# Patient Record
Sex: MALE | Race: BLACK OR AFRICAN AMERICAN | Employment: OTHER | URBAN - METROPOLITAN AREA
[De-identification: names, ages, dates, MRNs, and addresses within clinical notes are randomized per-mention and may not be internally consistent; named-entity substitution may affect disease eponyms.]

---

## 2021-06-17 ENCOUNTER — TELEPHONE (OUTPATIENT)
Dept: ADMINISTRATIVE | Age: 66
End: 2021-06-17

## 2021-06-21 NOTE — TELEPHONE ENCOUNTER
Urgent referral in[employer] message was sent on  5/28/21 to [facility] and clinical pool   asking them to schedule the patient.    This patient still needs to be scheduled, please call patient to schedule

## 2021-09-03 NOTE — TELEPHONE ENCOUNTER
Called to schedule patient. Patient had no clue why I was calling to schedule him an appointment. Patient stated he already sees other doctors and does not need to make an appointment at this time.

## 2021-09-03 NOTE — TELEPHONE ENCOUNTER
3 urgent referral inbasket messages and a telephone encounter have been sent on this pt. This patient still needs to be scheduled. Please contact pt.

## 2023-05-03 ENCOUNTER — HOSPITAL ENCOUNTER (OUTPATIENT)
Dept: ULTRASOUND IMAGING | Age: 68
Discharge: HOME | End: 2023-05-05
Payer: COMMERCIAL

## 2023-05-03 DIAGNOSIS — R79.89 ELEVATED LFTS: ICD-10-CM

## 2023-05-03 PROCEDURE — 76705 ECHO EXAM OF ABDOMEN: CPT

## 2023-10-31 ENCOUNTER — APPOINTMENT (OUTPATIENT)
Dept: CT IMAGING | Age: 68
End: 2023-10-31
Payer: MEDICARE

## 2023-10-31 ENCOUNTER — HOSPITAL ENCOUNTER (INPATIENT)
Age: 68
LOS: 3 days | Discharge: HOME | End: 2023-11-03
Attending: STUDENT IN AN ORGANIZED HEALTH CARE EDUCATION/TRAINING PROGRAM | Admitting: STUDENT IN AN ORGANIZED HEALTH CARE EDUCATION/TRAINING PROGRAM
Payer: MEDICARE

## 2023-10-31 ENCOUNTER — APPOINTMENT (OUTPATIENT)
Dept: GENERAL RADIOLOGY | Age: 68
End: 2023-10-31
Payer: MEDICARE

## 2023-10-31 DIAGNOSIS — R53.83 OTHER FATIGUE: Primary | ICD-10-CM

## 2023-10-31 DIAGNOSIS — E83.42 HYPOMAGNESEMIA: ICD-10-CM

## 2023-10-31 DIAGNOSIS — E87.6 HYPOKALEMIA: ICD-10-CM

## 2023-10-31 DIAGNOSIS — D64.9 ANEMIA, UNSPECIFIED TYPE: ICD-10-CM

## 2023-10-31 PROBLEM — K92.2 ACUTE GI BLEEDING: Status: ACTIVE | Noted: 2023-10-31

## 2023-10-31 LAB
ALBUMIN SERPL-MCNC: 3.4 G/DL (ref 3.5–5.2)
ALP SERPL-CCNC: 196 U/L (ref 40–129)
ALT SERPL-CCNC: 6 U/L (ref 0–40)
AMPHET UR QL SCN: NEGATIVE
ANION GAP SERPL CALCULATED.3IONS-SCNC: 13 MMOL/L (ref 7–16)
ANION GAP SERPL CALCULATED.3IONS-SCNC: 14 MMOL/L (ref 7–16)
APAP SERPL-MCNC: <5 UG/ML (ref 10–30)
AST SERPL-CCNC: 32 U/L (ref 0–39)
BARBITURATES UR QL SCN: NEGATIVE
BASOPHILS # BLD: 0.03 K/UL (ref 0–0.2)
BASOPHILS NFR BLD: 0 % (ref 0–2)
BENZODIAZ UR QL: NEGATIVE
BILIRUB SERPL-MCNC: 0.8 MG/DL (ref 0–1.2)
BILIRUB UR QL STRIP: ABNORMAL
BNP SERPL-MCNC: 278 PG/ML (ref 0–125)
BUN SERPL-MCNC: 5 MG/DL (ref 6–23)
BUN SERPL-MCNC: 5 MG/DL (ref 6–23)
BUPRENORPHINE UR QL: NEGATIVE
CALCIUM SERPL-MCNC: 8.4 MG/DL (ref 8.6–10.2)
CALCIUM SERPL-MCNC: 8.4 MG/DL (ref 8.6–10.2)
CANNABINOIDS UR QL SCN: NEGATIVE
CHLORIDE SERPL-SCNC: 100 MMOL/L (ref 98–107)
CHLORIDE SERPL-SCNC: 102 MMOL/L (ref 98–107)
CLARITY UR: CLEAR
CO2 SERPL-SCNC: 22 MMOL/L (ref 22–29)
CO2 SERPL-SCNC: 22 MMOL/L (ref 22–29)
COCAINE UR QL SCN: NEGATIVE
COLOR UR: YELLOW
CREAT SERPL-MCNC: 0.6 MG/DL (ref 0.7–1.2)
CREAT SERPL-MCNC: 0.6 MG/DL (ref 0.7–1.2)
EOSINOPHIL # BLD: 0.07 K/UL (ref 0.05–0.5)
EOSINOPHILS RELATIVE PERCENT: 1 % (ref 0–6)
ERYTHROCYTE [DISTWIDTH] IN BLOOD BY AUTOMATED COUNT: 18 % (ref 11.5–15)
ETHANOLAMINE SERPL-MCNC: 75 MG/DL
ETHANOLAMINE SERPL-MCNC: <10 MG/DL
FENTANYL UR QL: NEGATIVE
FERRITIN SERPL-MCNC: 539 NG/ML
FOLATE SERPL-MCNC: <2 NG/ML (ref 4.8–24.2)
GFR SERPL CREATININE-BSD FRML MDRD: >60 ML/MIN/1.73M2
GFR SERPL CREATININE-BSD FRML MDRD: >60 ML/MIN/1.73M2
GLUCOSE SERPL-MCNC: 92 MG/DL (ref 74–99)
GLUCOSE SERPL-MCNC: 95 MG/DL (ref 74–99)
GLUCOSE UR STRIP-MCNC: 100 MG/DL
HCT VFR BLD AUTO: 21.3 % (ref 37–54)
HCT VFR BLD AUTO: 22.2 % (ref 37–54)
HGB BLD-MCNC: 6.5 G/DL (ref 12.5–16.5)
HGB BLD-MCNC: 6.6 G/DL (ref 12.5–16.5)
HGB UR QL STRIP.AUTO: NEGATIVE
IMM GRANULOCYTES # BLD AUTO: 0.03 K/UL (ref 0–0.58)
IMM GRANULOCYTES NFR BLD: 0 % (ref 0–5)
INR PPP: 1.3
IRON SATN MFR SERPL: ABNORMAL % (ref 20–55)
IRON SERPL-MCNC: 160 UG/DL (ref 59–158)
KETONES UR STRIP-MCNC: ABNORMAL MG/DL
LACTATE BLDV-SCNC: 3.7 MMOL/L (ref 0.5–2.2)
LDH SERPL-CCNC: 157 U/L (ref 135–225)
LEUKOCYTE ESTERASE UR QL STRIP: ABNORMAL
LYMPHOCYTES NFR BLD: 1.38 K/UL (ref 1.5–4)
LYMPHOCYTES RELATIVE PERCENT: 16 % (ref 20–42)
MAGNESIUM SERPL-MCNC: 1.3 MG/DL (ref 1.6–2.6)
MAGNESIUM SERPL-MCNC: 1.9 MG/DL (ref 1.6–2.6)
MCH RBC QN AUTO: 27.3 PG (ref 26–35)
MCHC RBC AUTO-ENTMCNC: 30.5 G/DL (ref 32–34.5)
MCV RBC AUTO: 89.5 FL (ref 80–99.9)
METHADONE UR QL: NEGATIVE
MONOCYTES NFR BLD: 0.38 K/UL (ref 0.1–0.95)
MONOCYTES NFR BLD: 5 % (ref 2–12)
NEUTROPHILS NFR BLD: 78 % (ref 43–80)
NEUTS SEG NFR BLD: 6.61 K/UL (ref 1.8–7.3)
NITRITE UR QL STRIP: NEGATIVE
OPIATES UR QL SCN: NEGATIVE
OXYCODONE UR QL SCN: NEGATIVE
PCP UR QL SCN: NEGATIVE
PH UR STRIP: 6.5 [PH] (ref 5–9)
PLATELET # BLD AUTO: 325 K/UL (ref 130–450)
PMV BLD AUTO: 9.2 FL (ref 7–12)
POTASSIUM SERPL-SCNC: 2.5 MMOL/L (ref 3.5–5)
POTASSIUM SERPL-SCNC: 3.5 MMOL/L (ref 3.5–5)
PROT SERPL-MCNC: 7.1 G/DL (ref 6.4–8.3)
PROT UR STRIP-MCNC: 30 MG/DL
PROTHROMBIN TIME: 14.2 SEC (ref 9.3–12.4)
RBC # BLD AUTO: 2.38 M/UL (ref 3.8–5.8)
SALICYLATES SERPL-MCNC: <0.3 MG/DL (ref 0–30)
SODIUM SERPL-SCNC: 136 MMOL/L (ref 132–146)
SODIUM SERPL-SCNC: 137 MMOL/L (ref 132–146)
SP GR UR STRIP: 1.01 (ref 1–1.03)
TEST INFORMATION: NORMAL
TIBC SERPL-MCNC: ABNORMAL UG/DL (ref 250–450)
TOXIC TRICYCLIC SC,BLOOD: NEGATIVE
TRANSFERRIN SERPL-MCNC: 141 MG/DL (ref 200–360)
TROPONIN I SERPL HS-MCNC: 15 NG/L (ref 0–11)
UROBILINOGEN UR STRIP-ACNC: >8 EU/DL (ref 0–1)
VIT B12 SERPL-MCNC: 1589 PG/ML (ref 211–946)
WBC OTHER # BLD: 8.5 K/UL (ref 4.5–11.5)

## 2023-10-31 PROCEDURE — 80179 DRUG ASSAY SALICYLATE: CPT

## 2023-10-31 PROCEDURE — 74177 CT ABD & PELVIS W/CONTRAST: CPT

## 2023-10-31 PROCEDURE — 96361 HYDRATE IV INFUSION ADD-ON: CPT

## 2023-10-31 PROCEDURE — 83735 ASSAY OF MAGNESIUM: CPT

## 2023-10-31 PROCEDURE — 86850 RBC ANTIBODY SCREEN: CPT

## 2023-10-31 PROCEDURE — 80053 COMPREHEN METABOLIC PANEL: CPT

## 2023-10-31 PROCEDURE — 99285 EMERGENCY DEPT VISIT HI MDM: CPT

## 2023-10-31 PROCEDURE — 6360000004 HC RX CONTRAST MEDICATION: Performed by: RADIOLOGY

## 2023-10-31 PROCEDURE — 96374 THER/PROPH/DIAG INJ IV PUSH: CPT

## 2023-10-31 PROCEDURE — 83605 ASSAY OF LACTIC ACID: CPT

## 2023-10-31 PROCEDURE — 6360000002 HC RX W HCPCS: Performed by: STUDENT IN AN ORGANIZED HEALTH CARE EDUCATION/TRAINING PROGRAM

## 2023-10-31 PROCEDURE — 84466 ASSAY OF TRANSFERRIN: CPT

## 2023-10-31 PROCEDURE — 6370000000 HC RX 637 (ALT 250 FOR IP): Performed by: NURSE PRACTITIONER

## 2023-10-31 PROCEDURE — 86900 BLOOD TYPING SEROLOGIC ABO: CPT

## 2023-10-31 PROCEDURE — 80143 DRUG ASSAY ACETAMINOPHEN: CPT

## 2023-10-31 PROCEDURE — 2580000003 HC RX 258: Performed by: NURSE PRACTITIONER

## 2023-10-31 PROCEDURE — 85025 COMPLETE CBC W/AUTO DIFF WBC: CPT

## 2023-10-31 PROCEDURE — 71250 CT THORAX DX C-: CPT

## 2023-10-31 PROCEDURE — 93005 ELECTROCARDIOGRAM TRACING: CPT | Performed by: NURSE PRACTITIONER

## 2023-10-31 PROCEDURE — 85018 HEMOGLOBIN: CPT

## 2023-10-31 PROCEDURE — 82746 ASSAY OF FOLIC ACID SERUM: CPT

## 2023-10-31 PROCEDURE — 84484 ASSAY OF TROPONIN QUANT: CPT

## 2023-10-31 PROCEDURE — 85014 HEMATOCRIT: CPT

## 2023-10-31 PROCEDURE — 71046 X-RAY EXAM CHEST 2 VIEWS: CPT

## 2023-10-31 PROCEDURE — 80307 DRUG TEST PRSMV CHEM ANLYZR: CPT

## 2023-10-31 PROCEDURE — C9113 INJ PANTOPRAZOLE SODIUM, VIA: HCPCS | Performed by: NURSE PRACTITIONER

## 2023-10-31 PROCEDURE — 83615 LACTATE (LD) (LDH) ENZYME: CPT

## 2023-10-31 PROCEDURE — 36415 COLL VENOUS BLD VENIPUNCTURE: CPT

## 2023-10-31 PROCEDURE — 81001 URINALYSIS AUTO W/SCOPE: CPT

## 2023-10-31 PROCEDURE — 2140000000 HC CCU INTERMEDIATE R&B

## 2023-10-31 PROCEDURE — 85610 PROTHROMBIN TIME: CPT

## 2023-10-31 PROCEDURE — G0480 DRUG TEST DEF 1-7 CLASSES: HCPCS

## 2023-10-31 PROCEDURE — 86901 BLOOD TYPING SEROLOGIC RH(D): CPT

## 2023-10-31 PROCEDURE — 82607 VITAMIN B-12: CPT

## 2023-10-31 PROCEDURE — 82728 ASSAY OF FERRITIN: CPT

## 2023-10-31 PROCEDURE — 83540 ASSAY OF IRON: CPT

## 2023-10-31 PROCEDURE — 80048 BASIC METABOLIC PNL TOTAL CA: CPT

## 2023-10-31 PROCEDURE — A4216 STERILE WATER/SALINE, 10 ML: HCPCS | Performed by: STUDENT IN AN ORGANIZED HEALTH CARE EDUCATION/TRAINING PROGRAM

## 2023-10-31 PROCEDURE — 6360000002 HC RX W HCPCS: Performed by: NURSE PRACTITIONER

## 2023-10-31 PROCEDURE — 2580000003 HC RX 258: Performed by: STUDENT IN AN ORGANIZED HEALTH CARE EDUCATION/TRAINING PROGRAM

## 2023-10-31 PROCEDURE — C9113 INJ PANTOPRAZOLE SODIUM, VIA: HCPCS | Performed by: STUDENT IN AN ORGANIZED HEALTH CARE EDUCATION/TRAINING PROGRAM

## 2023-10-31 PROCEDURE — 86923 COMPATIBILITY TEST ELECTRIC: CPT

## 2023-10-31 PROCEDURE — 83880 ASSAY OF NATRIURETIC PEPTIDE: CPT

## 2023-10-31 RX ORDER — LOSARTAN POTASSIUM 100 MG/1
100 TABLET ORAL DAILY
COMMUNITY

## 2023-10-31 RX ORDER — POTASSIUM CHLORIDE 20 MEQ/1
40 TABLET, EXTENDED RELEASE ORAL ONCE
Status: COMPLETED | OUTPATIENT
Start: 2023-10-31 | End: 2023-10-31

## 2023-10-31 RX ORDER — SODIUM CHLORIDE 9 MG/ML
INJECTION, SOLUTION INTRAVENOUS PRN
Status: DISCONTINUED | OUTPATIENT
Start: 2023-10-31 | End: 2023-11-03 | Stop reason: HOSPADM

## 2023-10-31 RX ORDER — FERROUS SULFATE 325(65) MG
325 TABLET ORAL
Status: ON HOLD | COMMUNITY
End: 2023-11-02 | Stop reason: HOSPADM

## 2023-10-31 RX ORDER — POTASSIUM CHLORIDE 7.45 MG/ML
10 INJECTION INTRAVENOUS
Status: ACTIVE | OUTPATIENT
Start: 2023-10-31 | End: 2023-10-31

## 2023-10-31 RX ORDER — POTASSIUM CHLORIDE 7.45 MG/ML
10 INJECTION INTRAVENOUS PRN
Status: DISCONTINUED | OUTPATIENT
Start: 2023-10-31 | End: 2023-11-03 | Stop reason: HOSPADM

## 2023-10-31 RX ORDER — PANTOPRAZOLE SODIUM 40 MG/10ML
40 INJECTION, POWDER, LYOPHILIZED, FOR SOLUTION INTRAVENOUS 2 TIMES DAILY
Status: DISCONTINUED | OUTPATIENT
Start: 2023-10-31 | End: 2023-10-31 | Stop reason: SDUPTHER

## 2023-10-31 RX ORDER — ACETAMINOPHEN 650 MG/1
650 SUPPOSITORY RECTAL EVERY 6 HOURS PRN
Status: DISCONTINUED | OUTPATIENT
Start: 2023-10-31 | End: 2023-11-03 | Stop reason: HOSPADM

## 2023-10-31 RX ORDER — SODIUM CHLORIDE 0.9 % (FLUSH) 0.9 %
10 SYRINGE (ML) INJECTION EVERY 12 HOURS SCHEDULED
Status: DISCONTINUED | OUTPATIENT
Start: 2023-10-31 | End: 2023-11-03 | Stop reason: HOSPADM

## 2023-10-31 RX ORDER — PANTOPRAZOLE SODIUM 40 MG/10ML
40 INJECTION, POWDER, LYOPHILIZED, FOR SOLUTION INTRAVENOUS ONCE
Status: COMPLETED | OUTPATIENT
Start: 2023-10-31 | End: 2023-10-31

## 2023-10-31 RX ORDER — ACETAMINOPHEN 325 MG/1
650 TABLET ORAL EVERY 6 HOURS PRN
Status: DISCONTINUED | OUTPATIENT
Start: 2023-10-31 | End: 2023-11-03 | Stop reason: HOSPADM

## 2023-10-31 RX ORDER — SENNOSIDES A AND B 8.6 MG/1
1 TABLET, FILM COATED ORAL DAILY PRN
Status: DISCONTINUED | OUTPATIENT
Start: 2023-10-31 | End: 2023-11-03 | Stop reason: HOSPADM

## 2023-10-31 RX ORDER — GABAPENTIN 100 MG/1
100 CAPSULE ORAL EVERY 12 HOURS PRN
COMMUNITY

## 2023-10-31 RX ORDER — THIAMINE MONONITRATE (VIT B1) 100 MG
100 TABLET ORAL DAILY
COMMUNITY

## 2023-10-31 RX ORDER — TAMSULOSIN HYDROCHLORIDE 0.4 MG/1
0.4 CAPSULE ORAL DAILY
COMMUNITY

## 2023-10-31 RX ORDER — SODIUM CHLORIDE 0.9 % (FLUSH) 0.9 %
10 SYRINGE (ML) INJECTION PRN
Status: DISCONTINUED | OUTPATIENT
Start: 2023-10-31 | End: 2023-11-03 | Stop reason: HOSPADM

## 2023-10-31 RX ORDER — SODIUM CHLORIDE AND POTASSIUM CHLORIDE 300; 900 MG/100ML; MG/100ML
INJECTION, SOLUTION INTRAVENOUS CONTINUOUS
Status: DISCONTINUED | OUTPATIENT
Start: 2023-10-31 | End: 2023-11-01

## 2023-10-31 RX ORDER — FOLIC ACID 1 MG/1
1 TABLET ORAL DAILY
COMMUNITY

## 2023-10-31 RX ORDER — MAGNESIUM SULFATE IN WATER 40 MG/ML
2000 INJECTION, SOLUTION INTRAVENOUS ONCE
Status: COMPLETED | OUTPATIENT
Start: 2023-10-31 | End: 2023-10-31

## 2023-10-31 RX ORDER — ONDANSETRON 2 MG/ML
4 INJECTION INTRAMUSCULAR; INTRAVENOUS EVERY 6 HOURS PRN
Status: DISCONTINUED | OUTPATIENT
Start: 2023-10-31 | End: 2023-11-03 | Stop reason: HOSPADM

## 2023-10-31 RX ORDER — ONDANSETRON 4 MG/1
4 TABLET, ORALLY DISINTEGRATING ORAL EVERY 8 HOURS PRN
Status: DISCONTINUED | OUTPATIENT
Start: 2023-10-31 | End: 2023-11-03 | Stop reason: HOSPADM

## 2023-10-31 RX ORDER — POTASSIUM CHLORIDE 20 MEQ/1
40 TABLET, EXTENDED RELEASE ORAL PRN
Status: DISCONTINUED | OUTPATIENT
Start: 2023-10-31 | End: 2023-11-03 | Stop reason: HOSPADM

## 2023-10-31 RX ORDER — 0.9 % SODIUM CHLORIDE 0.9 %
500 INTRAVENOUS SOLUTION INTRAVENOUS ONCE
Status: COMPLETED | OUTPATIENT
Start: 2023-10-31 | End: 2023-10-31

## 2023-10-31 RX ORDER — LANOLIN ALCOHOL/MO/W.PET/CERES
3 CREAM (GRAM) TOPICAL NIGHTLY PRN
Status: DISCONTINUED | OUTPATIENT
Start: 2023-10-31 | End: 2023-11-03 | Stop reason: HOSPADM

## 2023-10-31 RX ADMIN — PANTOPRAZOLE SODIUM 40 MG: 40 INJECTION, POWDER, FOR SOLUTION INTRAVENOUS at 21:36

## 2023-10-31 RX ADMIN — IOPAMIDOL 75 ML: 755 INJECTION, SOLUTION INTRAVENOUS at 18:54

## 2023-10-31 RX ADMIN — POTASSIUM CHLORIDE 10 MEQ: 10 INJECTION, SOLUTION INTRAVENOUS at 20:17

## 2023-10-31 RX ADMIN — POTASSIUM CHLORIDE 10 MEQ: 10 INJECTION, SOLUTION INTRAVENOUS at 21:42

## 2023-10-31 RX ADMIN — POTASSIUM CHLORIDE AND SODIUM CHLORIDE: 900; 300 INJECTION, SOLUTION INTRAVENOUS at 21:39

## 2023-10-31 RX ADMIN — WATER 1000 MG: 1 INJECTION INTRAMUSCULAR; INTRAVENOUS; SUBCUTANEOUS at 20:03

## 2023-10-31 RX ADMIN — SODIUM CHLORIDE 500 ML: 9 INJECTION, SOLUTION INTRAVENOUS at 15:13

## 2023-10-31 RX ADMIN — PANTOPRAZOLE SODIUM 40 MG: 40 INJECTION, POWDER, FOR SOLUTION INTRAVENOUS at 15:13

## 2023-10-31 RX ADMIN — POTASSIUM CHLORIDE 10 MEQ: 10 INJECTION, SOLUTION INTRAVENOUS at 22:42

## 2023-10-31 RX ADMIN — MAGNESIUM SULFATE IN WATER 2000 MG: 40 INJECTION, SOLUTION INTRAVENOUS at 20:15

## 2023-10-31 RX ADMIN — POTASSIUM CHLORIDE 40 MEQ: 1500 TABLET, EXTENDED RELEASE ORAL at 20:17

## 2023-10-31 ASSESSMENT — PAIN - FUNCTIONAL ASSESSMENT: PAIN_FUNCTIONAL_ASSESSMENT: NONE - DENIES PAIN

## 2023-10-31 NOTE — ED NOTES
Department of Emergency Medicine  FIRST PROVIDER TRIAGE NOTE             Independent MLP           10/31/23  2:41 PM EDT    Date of Encounter: 10/31/23   MRN: [id number]      HPI: Munira Albarran is a 80 y.o. male who presents to the ED for No chief complaint on file. Patient is 52-year-old who states that he has been very fatigued and tired the last few weeks. Patient has dark-colored urine. Patient had blood work done and his hemoglobin was found to be 6.5 done today. Patient also has a low potassium of 2.9. I did review the paperwork. ROS: Negative for cp, sob, abd pain, or back pain. PE: Gen Appearance/Constitutional: alert  HEENT: NC/NT. PERRLA,  Airway patent. Neck: supple     Initial Plan of Care: All treatment areas with department are currently occupied. Plan to order/Initiate the following while awaiting opening in ED: labs and EKG.   Initiate Treatment-Testing, Proceed toTreatment Area When Bed Available for ED Attending/MLP to Continue Care    Electronically signed by Higinio Bran PA-C   DD: 10/31/23       Higinio Bran PA-C  10/31/23 5648

## 2023-10-31 NOTE — CARE COORDINATION
History of alcohol abuse   Reportedly sober 28 days   Presented for abnormal op labs and reports of dark tarry stools   Reportedly occult blood stool negative at PCP office   History concerning for upper GI bleeding however given hx of etoh abuse and now with acute anemia requiring blood transfusion     CT a/p   PPI IV BID   HH Q6H   Transfuse <7 Gi consultation within normal limits

## 2023-11-01 LAB
ALBUMIN SERPL-MCNC: 3.1 G/DL (ref 3.5–5.2)
ALP SERPL-CCNC: 165 U/L (ref 40–129)
ALT SERPL-CCNC: <5 U/L (ref 0–40)
ANION GAP SERPL CALCULATED.3IONS-SCNC: 14 MMOL/L (ref 7–16)
AST SERPL-CCNC: 28 U/L (ref 0–39)
BACTERIA URNS QL MICRO: ABNORMAL
BASOPHILS # BLD: 0.03 K/UL (ref 0–0.2)
BASOPHILS NFR BLD: 0 % (ref 0–2)
BILIRUB SERPL-MCNC: 1.4 MG/DL (ref 0–1.2)
BUN SERPL-MCNC: 5 MG/DL (ref 6–23)
CALCIUM SERPL-MCNC: 7.9 MG/DL (ref 8.6–10.2)
CASTS #/AREA URNS LPF: ABNORMAL /LPF
CHLORIDE SERPL-SCNC: 106 MMOL/L (ref 98–107)
CO2 SERPL-SCNC: 19 MMOL/L (ref 22–29)
CREAT SERPL-MCNC: 0.5 MG/DL (ref 0.7–1.2)
EKG ATRIAL RATE: 69 BPM
EKG P AXIS: 41 DEGREES
EKG P-R INTERVAL: 176 MS
EKG Q-T INTERVAL: 418 MS
EKG QRS DURATION: 84 MS
EKG QTC CALCULATION (BAZETT): 447 MS
EKG R AXIS: -31 DEGREES
EKG T AXIS: -23 DEGREES
EKG VENTRICULAR RATE: 69 BPM
EOSINOPHIL # BLD: 0.11 K/UL (ref 0.05–0.5)
EOSINOPHILS RELATIVE PERCENT: 1 % (ref 0–6)
EPI CELLS #/AREA URNS HPF: ABNORMAL /HPF
ERYTHROCYTE [DISTWIDTH] IN BLOOD BY AUTOMATED COUNT: 16.6 % (ref 11.5–15)
GFR SERPL CREATININE-BSD FRML MDRD: >60 ML/MIN/1.73M2
GLUCOSE SERPL-MCNC: 87 MG/DL (ref 74–99)
HCT VFR BLD AUTO: 21.5 % (ref 37–54)
HCT VFR BLD AUTO: 22 % (ref 37–54)
HCT VFR BLD AUTO: 22.9 % (ref 37–54)
HCT VFR BLD AUTO: 23.6 % (ref 37–54)
HCT VFR BLD AUTO: 26 % (ref 37–54)
HEMOCCULT STL QL: ABNORMAL
HGB BLD-MCNC: 6.6 G/DL (ref 12.5–16.5)
HGB BLD-MCNC: 6.7 G/DL (ref 12.5–16.5)
HGB BLD-MCNC: 7.2 G/DL (ref 12.5–16.5)
HGB BLD-MCNC: 7.4 G/DL (ref 12.5–16.5)
HGB BLD-MCNC: 7.8 G/DL (ref 12.5–16.5)
IMM GRANULOCYTES # BLD AUTO: 0.04 K/UL (ref 0–0.58)
IMM GRANULOCYTES NFR BLD: 1 % (ref 0–5)
LYMPHOCYTES NFR BLD: 1.47 K/UL (ref 1.5–4)
LYMPHOCYTES RELATIVE PERCENT: 17 % (ref 20–42)
MCH RBC QN AUTO: 27.5 PG (ref 26–35)
MCHC RBC AUTO-ENTMCNC: 30.5 G/DL (ref 32–34.5)
MCV RBC AUTO: 90.2 FL (ref 80–99.9)
MONOCYTES NFR BLD: 0.46 K/UL (ref 0.1–0.95)
MONOCYTES NFR BLD: 5 % (ref 2–12)
NEUTROPHILS NFR BLD: 76 % (ref 43–80)
NEUTS SEG NFR BLD: 6.73 K/UL (ref 1.8–7.3)
PLATELET # BLD AUTO: 253 K/UL (ref 130–450)
PMV BLD AUTO: 9.7 FL (ref 7–12)
POTASSIUM SERPL-SCNC: 3.8 MMOL/L (ref 3.5–5)
PROT SERPL-MCNC: 6.3 G/DL (ref 6.4–8.3)
RBC # BLD AUTO: 2.44 M/UL (ref 3.8–5.8)
RBC #/AREA URNS HPF: ABNORMAL /HPF
SODIUM SERPL-SCNC: 139 MMOL/L (ref 132–146)
WBC #/AREA URNS HPF: ABNORMAL /HPF
WBC OTHER # BLD: 8.8 K/UL (ref 4.5–11.5)

## 2023-11-01 PROCEDURE — 6370000000 HC RX 637 (ALT 250 FOR IP): Performed by: STUDENT IN AN ORGANIZED HEALTH CARE EDUCATION/TRAINING PROGRAM

## 2023-11-01 PROCEDURE — C9113 INJ PANTOPRAZOLE SODIUM, VIA: HCPCS | Performed by: STUDENT IN AN ORGANIZED HEALTH CARE EDUCATION/TRAINING PROGRAM

## 2023-11-01 PROCEDURE — 85018 HEMOGLOBIN: CPT

## 2023-11-01 PROCEDURE — 2500000003 HC RX 250 WO HCPCS: Performed by: STUDENT IN AN ORGANIZED HEALTH CARE EDUCATION/TRAINING PROGRAM

## 2023-11-01 PROCEDURE — P9016 RBC LEUKOCYTES REDUCED: HCPCS

## 2023-11-01 PROCEDURE — 6360000002 HC RX W HCPCS: Performed by: STUDENT IN AN ORGANIZED HEALTH CARE EDUCATION/TRAINING PROGRAM

## 2023-11-01 PROCEDURE — 36430 TRANSFUSION BLD/BLD COMPNT: CPT

## 2023-11-01 PROCEDURE — 97165 OT EVAL LOW COMPLEX 30 MIN: CPT

## 2023-11-01 PROCEDURE — 97161 PT EVAL LOW COMPLEX 20 MIN: CPT

## 2023-11-01 PROCEDURE — 2140000000 HC CCU INTERMEDIATE R&B

## 2023-11-01 PROCEDURE — 80053 COMPREHEN METABOLIC PANEL: CPT

## 2023-11-01 PROCEDURE — A4216 STERILE WATER/SALINE, 10 ML: HCPCS | Performed by: STUDENT IN AN ORGANIZED HEALTH CARE EDUCATION/TRAINING PROGRAM

## 2023-11-01 PROCEDURE — 85025 COMPLETE CBC W/AUTO DIFF WBC: CPT

## 2023-11-01 PROCEDURE — 85014 HEMATOCRIT: CPT

## 2023-11-01 PROCEDURE — 99221 1ST HOSP IP/OBS SF/LOW 40: CPT | Performed by: NURSE PRACTITIONER

## 2023-11-01 PROCEDURE — 30233N1 TRANSFUSION OF NONAUTOLOGOUS RED BLOOD CELLS INTO PERIPHERAL VEIN, PERCUTANEOUS APPROACH: ICD-10-PCS | Performed by: STUDENT IN AN ORGANIZED HEALTH CARE EDUCATION/TRAINING PROGRAM

## 2023-11-01 PROCEDURE — 97535 SELF CARE MNGMENT TRAINING: CPT

## 2023-11-01 PROCEDURE — 36415 COLL VENOUS BLD VENIPUNCTURE: CPT

## 2023-11-01 PROCEDURE — 97530 THERAPEUTIC ACTIVITIES: CPT

## 2023-11-01 PROCEDURE — 2580000003 HC RX 258: Performed by: STUDENT IN AN ORGANIZED HEALTH CARE EDUCATION/TRAINING PROGRAM

## 2023-11-01 PROCEDURE — 93010 ELECTROCARDIOGRAM REPORT: CPT | Performed by: INTERNAL MEDICINE

## 2023-11-01 RX ORDER — SODIUM CHLORIDE 9 MG/ML
INJECTION, SOLUTION INTRAVENOUS PRN
Status: DISCONTINUED | OUTPATIENT
Start: 2023-11-01 | End: 2023-11-03 | Stop reason: HOSPADM

## 2023-11-01 RX ORDER — SODIUM CHLORIDE AND POTASSIUM CHLORIDE 300; 900 MG/100ML; MG/100ML
INJECTION, SOLUTION INTRAVENOUS CONTINUOUS
Status: DISCONTINUED | OUTPATIENT
Start: 2023-11-01 | End: 2023-11-01

## 2023-11-01 RX ORDER — FOLIC ACID 5 MG/ML
1 INJECTION, SOLUTION INTRAMUSCULAR; INTRAVENOUS; SUBCUTANEOUS DAILY
Status: DISCONTINUED | OUTPATIENT
Start: 2023-11-01 | End: 2023-11-03 | Stop reason: HOSPADM

## 2023-11-01 RX ORDER — THIAMINE HYDROCHLORIDE 100 MG/ML
100 INJECTION, SOLUTION INTRAMUSCULAR; INTRAVENOUS DAILY
Status: DISCONTINUED | OUTPATIENT
Start: 2023-11-01 | End: 2023-11-03 | Stop reason: HOSPADM

## 2023-11-01 RX ADMIN — POTASSIUM CHLORIDE 10 MEQ: 10 INJECTION, SOLUTION INTRAVENOUS at 04:13

## 2023-11-01 RX ADMIN — POTASSIUM CHLORIDE AND SODIUM CHLORIDE: 900; 300 INJECTION, SOLUTION INTRAVENOUS at 08:04

## 2023-11-01 RX ADMIN — PANTOPRAZOLE SODIUM 40 MG: 40 INJECTION, POWDER, FOR SOLUTION INTRAVENOUS at 11:40

## 2023-11-01 RX ADMIN — FOLIC ACID 1 MG: 5 INJECTION, SOLUTION INTRAMUSCULAR; INTRAVENOUS; SUBCUTANEOUS at 11:41

## 2023-11-01 RX ADMIN — SODIUM CHLORIDE, PRESERVATIVE FREE 10 ML: 5 INJECTION INTRAVENOUS at 11:47

## 2023-11-01 RX ADMIN — PANTOPRAZOLE SODIUM 40 MG: 40 INJECTION, POWDER, FOR SOLUTION INTRAVENOUS at 19:48

## 2023-11-01 RX ADMIN — POTASSIUM CHLORIDE 10 MEQ: 10 INJECTION, SOLUTION INTRAVENOUS at 00:14

## 2023-11-01 RX ADMIN — ACETAMINOPHEN 650 MG: 325 TABLET ORAL at 01:24

## 2023-11-01 RX ADMIN — THIAMINE HYDROCHLORIDE 100 MG: 100 INJECTION, SOLUTION INTRAMUSCULAR; INTRAVENOUS at 11:41

## 2023-11-01 RX ADMIN — SODIUM CHLORIDE, PRESERVATIVE FREE 10 ML: 5 INJECTION INTRAVENOUS at 19:49

## 2023-11-01 RX ADMIN — POTASSIUM CHLORIDE 10 MEQ: 10 INJECTION, SOLUTION INTRAVENOUS at 02:10

## 2023-11-01 ASSESSMENT — PAIN SCALES - GENERAL
PAINLEVEL_OUTOF10: 3
PAINLEVEL_OUTOF10: 0

## 2023-11-01 ASSESSMENT — PAIN DESCRIPTION - LOCATION: LOCATION: HAND

## 2023-11-01 ASSESSMENT — PAIN DESCRIPTION - DESCRIPTORS: DESCRIPTORS: BURNING

## 2023-11-01 ASSESSMENT — PAIN DESCRIPTION - ORIENTATION: ORIENTATION: RIGHT

## 2023-11-01 NOTE — CONSULTS
[facility], [place] ([facility])   Gastroenterology, Hepatology, &  Advanced Endoscopy    Consult       ASSESSMENT AND PLAN:    Pt with recent history of melena (currently denies), and anemia. He has had a C-Scope demonstrating 2 small polyps and a EGD which was unremarkable. He has been followed by Dr. Osmel León for the previous procedures within the last 2 month's. He has been scheduled for a capsule endoscopy twice so far. The initial attempt he was unable to swallow the capsule due to size and the second attempt the equipment was not functioning. PLAN:  -monitor CBC, INR, LFT's  -Hgb to maintain >7  - Protonix 40 mg  - CL diet  - stabilize Hgb, then OK for discharge  -Follow with Dr. Osmel León for the capsule endoscopy. Patient is 52-year-old who states that he has been very fatigued and tired the last few weeks. Patient has dark-colored urine. hemoglobin was found to be 6.5 done on admission. EtOH history but states is 29 days sober currently. Spouse states he has been consuming EtOH since he was a teenager. She believes he continues to drink as he drinks outside of the house. Past Medical History:    History reviewed. No pertinent past medical history. Past Surgical History:    No past surgical history on file. Social History:    TOBACCO:   reports that he has been smoking cigarettes. He started smoking about 53 years ago. He has been smoking an average of .5 packs per day. He has never used smokeless tobacco.  ETOH:   has no history on file for alcohol use. DRUGS:   has no history on file for drug use. Family History:   No family history on file.       Current Facility-Administered Medications:     0.9 % sodium chloride infusion, , IntraVENous, PRN, Lacivita, Mary, APRN - CNP    thiamine (B-1) injection 100 mg, 100 mg, IntraVENous, Daily, Trever Anderson MD    folic acid injection 1 mg, 1 mg, IntraVENous, Daily, Anand Lin MD    0.9% NaCl with KCl 40 mEq infusion, , IntraVENous, Continuous, Anand Lin MD, Last Rate: 75 mL/hr at 11/01/23 0804, New Bag at 11/01/23 0804    0.9 % sodium chloride infusion, , IntraVENous, PRN, Evelyn Parks, APRN - CNP    sodium chloride flush 0.9 % injection 10 mL, 10 mL, IntraVENous, 2 times per day, Ellis Bajwa MD    sodium chloride flush 0.9 % injection 10 mL, 10 mL, IntraVENous, PRN, Ellis Bajwa MD    0.9 % sodium chloride infusion, , IntraVENous, PRN, Ellis Bajwa MD    potassium chloride (KLOR-CON M) extended release tablet 40 mEq, 40 mEq, Oral, PRN **OR** potassium bicarb-citric acid (EFFER-K) effervescent tablet 40 mEq, 40 mEq, Oral, PRN **OR** potassium chloride 10 mEq/100 mL IVPB (Peripheral Line), 10 mEq, IntraVENous, PRN, Ellis Bajwa MD, Last Rate: 100 mL/hr at 11/01/23 0413, 10 mEq at 11/01/23 0413    ondansetron (ZOFRAN-ODT) disintegrating tablet 4 mg, 4 mg, Oral, Q8H PRN **OR** ondansetron (ZOFRAN) injection 4 mg, 4 mg, IntraVENous, Q6H PRN, Ellis Bajwa MD    senna (SENOKOT) tablet 8.6 mg, 1 tablet, Oral, Daily PRN, Ellis Bajwa MD    acetaminophen (TYLENOL) tablet 650 mg, 650 mg, Oral, Q6H PRN, 650 mg at 11/01/23 0124 **OR** acetaminophen (TYLENOL) suppository 650 mg, 650 mg, Rectal, Q6H PRN, Ellis Bajwa MD    melatonin tablet 3 mg, 3 mg, Oral, Nightly PRN, Ellis Bajwa MD    pantoprazole (PROTONIX) 40 mg in sodium chloride (PF) 0.9 % 10 mL injection, 40 mg, IntraVENous, BID, Ellis Bajwa MD, 40 mg at 10/31/23 9338     Allergies:  Patient has no known allergies. ROS:  Aside from what was mentioned in the past medical history and history of present illness, essentially unremarkable, all others are negative.       Recent Labs     10/31/23  1451 11/01/23  0426   INR 1.3  --    ALT 6 <5   AST 32 28   ALKPHOS 196* 165*   BILITOT 0.8 1.4*   LABALBU 3.4* 3.1*       Lab Results   Component Value Date    WBC 8.8 11/01/2023    HGB 7.4 (L) 11/01/2023    HCT 23.6 (L) 11/01/2023     11/01/2023     11/01/2023    K 3.8 11/01/2023

## 2023-11-01 NOTE — H&P
Internal Medicine History & Physical     Name: Brianna Cline  : 1955  Chief Complaint: Abnormal Lab and Fatigue (Patient sent from PCP for abnormal labs K 2.9 and HGB 6.5 )  Primary Care Physician: Huma Tapia MD  Admission date: 10/31/2023  Date of service: 2023     History of Present Illness  Lc Duran is a 80y.o. year old male with a PMH of recurrent GI bleeding who presented with a chief complaint of anemia     Patient recently had c scope and EGD which were negative for acute causes of bleeding suspected to be having bleeding from small bowel has tried to have a capsule endoscopy twice but unable due to unforseen circumstances. The patient has a history of etoh abuse and he tells me he is 29 days sober is wife is at bedside and agrees with this. Denies ever having etoh wd or seizures. Otherwise K now better and ct a/p in ED showing ascites. History reviewed. No pertinent past medical history. No past surgical history on file. No family history on file. Social History  Patient lives at home . At baseline patient ambulates with out assistance   Illicit drugs: Denies   TOBACCO:   reports that he has been smoking cigarettes. He started smoking about 53 years ago. He has been smoking an average of .5 packs per day. He has never used smokeless tobacco.  ETOH:   has no history on file for alcohol use. Home Medications  Prior to Admission medications    Medication Sig Start Date End Date Taking? Authorizing Provider   folic acid (FOLVITE) 1 MG tablet Take 1 tablet by mouth daily   Yes Funmi Issa MD   vitamin B-1 (THIAMINE) 100 MG tablet Take 1 tablet by mouth daily   Yes Funmi Issa MD   losartan (COZAAR) 100 MG tablet Take 1 tablet by mouth daily   Yes Funmi Issa MD   gabapentin (NEURONTIN) 100 MG capsule Take 1 capsule by mouth every 12 hours as needed (for nerve pain).    Yes Funmi Issa MD   ferrous sulfate (IRON 325) 325 (65 Fe) MG palpation  Lungs: clear to auscultation bilaterally, without rhonchi, crackle, wheezing, or rale, no retractions or use of accessory muscles  Heart: regular rate and regular rhythm, no murmur, normal S1, S2  Abdomen: soft, non-tender; bowel sounds normal; no masses, no organomegaly  : Deferred   Extremities: no lower extremity edema, extremities atraumatic, no cyanosis, no clubbing, 2+ pedal pulses palpated  Skin: normal color, normal texture, normal turgor, no rashes, no lesions  Neurologic:5/5 muscle strength throughout, normal muscle tone throughout, face symmetric, hearing intact, tongue midline, speech appropriate without slurring, sensation to fine touch intact in upper and lower extremities    Labs-   Lab Results   Component Value Date    WBC 8.8 11/01/2023    HGB 6.6 (LL) 11/01/2023    HCT 21.5 (L) 11/01/2023     11/01/2023     11/01/2023    K 3.8 11/01/2023     11/01/2023    CREATININE 0.5 (L) 11/01/2023    BUN 5 (L) 11/01/2023    CO2 19 (L) 11/01/2023    GLUCOSE 87 11/01/2023    ALT <5 11/01/2023    AST 28 11/01/2023    INR 1.3 10/31/2023     No results found for: \"CKTOTAL\", \"CKMB\", \"CKMBINDEX\", \"TROPONINI\"    Last echocardiogram:      Recent Radiological Studies:  CT ABDOMEN PELVIS W IV CONTRAST Additional Contrast? None   Final Result   1. A suggestion of rectal wall thickening. May represent proctitis such as   due to radiation or infection, or neoplasm. 2. Also bladder appears thick-walled that may relate to prostate disease but   nonspecific and may be correlated with urinalysis. 3. Moderate ascites of uncertain etiology, possibly due to hepatic disease. RECOMMENDATIONS:   Clinical correlation. CT CHEST WO CONTRAST   Final Result   1. Mild bibasilar atelectasis. 2. Atherosclerotic changes described above. XR CHEST (2 VW)   Final Result   No acute process.              Assessment  Active Hospital Problems    Diagnosis     Acute GI bleeding [K92.2]

## 2023-11-01 NOTE — ACP (ADVANCE CARE PLANNING)
Advance Care Planning   Healthcare Decision Maker:    Primary Decision Maker: Maliha Victorbrendan - [phone]    Today we documented Decision Maker(s) consistent with Legal Next of Kin hierarchy.     Electronically signed by PARISH Cunningham on 11/1/2023 at 3:11 PM

## 2023-11-01 NOTE — CARE COORDINATION
Patient presented to the ED due to fatigue and abnormal labs, hgb 6.5 and K 2.9; admitted for acute GI bleed. Met with patient at bedside for transition of care planning. Patient reports he lives in a 3rd floor apartment with his wife, has elevator access, he is currently independent without a device, drives; reports no home DME. Uses [facility]) and PCP is Dr. Antonio Thurman. Plan is home, patient reports no home going needs. Home care services offered, patient declined. Patient's wife will transport home when discharged. Hgb 6.6 today; repeat was 7.4; GI consult pending. Case Management Assessment  Initial Evaluation    Date/Time of Evaluation: 11/1/2023 3:09 PM  Assessment Completed by: Josiane Wen    If patient is discharged prior to next notation, then this note serves as note for discharge by case management. Patient Name: Lety Sharma                   YOB: 1955  Diagnosis: Hypokalemia [E87.6]  Hypomagnesemia [E83.42]  Acute GI bleeding [K92.2]  Other fatigue [R53.83]  Anemia, unspecified type [D64.9]                   Date / Time: 10/31/2023  5:13 PM    Patient Admission Status: Inpatient   Readmission Risk (Low < 19, Mod (19-27), High > 27): Readmission Risk Score: 11.5    Current PCP: Ambrocio Bee MD  PCP verified by ? Yes    Chart Reviewed: Yes      History Provided by: Patient  Patient Orientation: Alert and Oriented    Patient Cognition: Alert    Hospitalization in the last 30 days (Readmission):  No    If yes, Readmission Assessment in  Navigator will be completed.     Advance Directives:      Code Status: Full Code   Patient's Primary Decision Maker is: Named in Scanned ACP Document      Discharge Planning:    Patient lives with: Spouse/Significant Other Type of Home: House  Primary Care Giver: Self  Patient Support Systems include: Spouse/Significant Other   Current Financial resources:    Current community resources:    Current services prior to admission: None            Current DME:              Type of Home Care services:  None    ADLS  Prior functional level: Independent in ADLs/IADLs  Current functional level: Independent in ADLs/IADLs    PT AM-PAC:   /24  OT AM-PAC: 20 /24    Family can provide assistance at DC: Yes  Would you like Case Management to discuss the discharge plan with any other family members/significant others, and if so, who? No  Plans to Return to Present Housing: Yes  Other Identified Issues/Barriers to RETURNING to current housing: n/a  Potential Assistance needed at discharge: N/A            Potential DME:    Patient expects to discharge to: [address] [place] [address] for transportation at discharge:      Financial    Payor: Mustapha Covert / Plan: Kenyetta Maier / Product Type: *No Product type* /     Does insurance require precert for SNF: Yes    Potential assistance Purchasing Medications:    Meds-to-Beds request: Yes      [address] [place] [address],[address] [place], [address] [address]  Phone: [phone] Fax: [phone]      Notes:    Factors facilitating achievement of predicted outcomes: Cooperative    Barriers to discharge: pending GI consult, low hgb    Additional Case Management Notes: n/a    The Plan for Transition of Care is related to the following treatment goals of Hypokalemia [E87.6]  Hypomagnesemia [E83.42]  Acute GI bleeding [K92.2]  Other fatigue [R53.83]  Anemia, unspecified type [E27.3]    IF APPLICABLE: The Patient and/or patient representative Lima Hancock and his family were provided with a choice of provider and agrees with the discharge plan. Freedom of choice list with basic dialogue that supports the patient's individualized plan of care/goals and shares the quality data associated with the providers was provided to: Patient   Patient Representative Name:       The Patient and/or Patient Representative Agree with the Discharge Plan?

## 2023-11-01 NOTE — PROGRESS NOTES
Occupational Therapy  OCCUPATIONAL THERAPY INITIAL EVALUATION    JAHAIRA Cantu [facility]   [address] [place] [address]      AXOH:                                                Patient Name: Chelsey Klein  MRN: [id number]  : 1955  Room: [address] [place] [address]    Evaluating OT: Edison Monsalve OTR/L #[id number]     Referring Provider: Chacha Hartley MD  Specific Provider Orders/Date: OT eval and treat 10/31/23    Diagnosis: Hypokalemia [E87.6]  Hypomagnesemia [E83.42]  Acute GI bleeding [K92.2]  Other fatigue [R53.83]  Anemia, unspecified type [D64.9]   Pt admitted to hospital with abnormal labs, tarry stool and fatigue; low hgb     Pertinent Medical History:  has no past medical history on file.        Precautions:  Fall Risk    Assessment of current deficits    [x] Functional mobility  [x]ADLs  [x] Strength               []Cognition    [x] Functional transfers   [x] IADLs         [x] Safety Awareness   [x]Endurance    [] Fine Coordination              [x] Balance      [] Vision/perception   []Sensation     []Gross Motor Coordination  [] ROM  [] Delirium                   [] Motor Control     OT PLAN OF CARE   OT POC based on physician orders, patient diagnosis and results of clinical assessment    Frequency/Duration 1-3 days/wk for 2 weeks PRN   Specific OT Treatment Interventions to include:   * Instruction/training on adapted ADL techniques and AE recommendations to increase functional independence within precautions       * Training on energy conservation strategies, correct breathing pattern and techniques to improve independence/tolerance for self-care routine  * Functional transfer/mobility training/DME recommendations for increased independence, safety, and fall prevention  * Patient/Family education to increase follow through with safety techniques and functional independence  * Recommendation of environmental modifications for increased safety with functional testing/informal observation of tasks, assessment of data and development of plan of care and goals.           Rena Sanford OTR/L #[id number]

## 2023-11-01 NOTE — PROGRESS NOTES
Physical Therapy  Physical Therapy Initial Assessment     Name: Kevin Chacko  : 1955  MRN: [id number]      Date of Service: 2023    Evaluating PT:  Ana Taylor, PT, DPT    Room #:  4359/0320-B  Diagnosis:  Hypokalemia [E87.6]  Hypomagnesemia [E83.42]  Acute GI bleeding [K92.2]  Other fatigue [R53.83]  Anemia, unspecified type [D64.9]  PMHx/PSHx:  EtOH abuse, prostate CA  Procedure/Surgery:  N/A  Precautions:  fall risk  Equipment Needs:  TBD    SUBJECTIVE:    Pt lives with spouse in a 3rd floor apt with level entry to building, elevator access to room. Pt ambulated with no AD PTA. OBJECTIVE:   Initial Evaluation  Date: 23 Treatment Short Term/ Long Term   Goals   AM-PAC 6 Clicks 87/54     Was pt agreeable to Eval/treatment? yes     Does pt have pain?  No pain     Bed Mobility  Rolling: IND  Supine to sit: IND  Sit to supine: NT  Scooting: IND     Transfers Sit to stand: Supervision  Stand to sit: Supervision  Stand pivot: Supervision with no AD  Sit to stand: IND  Stand to sit: IND  Stand pivot: IND with no AD   Ambulation    75'x2 with no AD Supervision  300'+ with no AD IND   Stair negotiation: ascended and descended  NT       Strength/ROM:   BLE grossly 5/5  BLE AROM WFL    Balance:   Static Sitting: IND  Dynamic Sitting: IND  Static Standing: IND with no AD  Dynamic Standing: Supervision with no AD    Pt is A & O x 3  Sensation:  Pt denies numbness and tingling to extremities  Edema:  unremarkable    Vitals:  SpO2 and HR were stable during session    Therapeutic Exercises:    Bed mobility: supine>sit, cued for EOB positioning  Transfers: STS x3, cued for hand placement and postural correction  Ambulation: 75'x2 with no AD  BLE AROM    Patient education  Pt educated on role of PT, importance of functional mobility during hospital stay, safety with functional mobility    Patient response to education:   Pt verbalized understanding Pt demonstrated skill Pt requires further education in this area

## 2023-11-01 NOTE — PROGRESS NOTES
FOBT (+)  Dr. Carlos Alberto Walter via [employer]    Dr. Juliane Dolan [facility]

## 2023-11-01 NOTE — PROGRESS NOTES
Comprehensive Nutrition Assessment    Type and Reason for Visit:  Initial, Positive Nutrition Screen    Nutrition Recommendations/Plan:   Continue current diet as tolerated. Recommend and start ONS: ensure + once daily to optimize PO intake. Will monitor. Malnutrition Assessment:  Malnutrition Status: At risk for malnutrition (Comment) (11/01/23 0488)    Context:  Acute Illness     Findings of the 6 clinical characteristics of malnutrition:  Energy Intake:  Mild decrease in energy intake (Comment)  Weight Loss:  Unable to assess (2/2 lack of wt hx on file to assess)     Body Fat Loss:  Unable to assess     Muscle Mass Loss:  Unable to assess    Fluid Accumulation:  Unable to assess     Strength:  Not Performed    Nutrition Assessment:    Pt. admitted for fatigue, abnormal op labs and reports of dark tarry stools w/ concern for acute GIB. GI consulted. Hx of etoh abuse. Pt. now on regular diet. Will continue to monitor. Nutrition Related Findings:    A&OX4, +I/O (1.2L), elevated bili (1.4), GI WDL, +1 edema, Wound Type: None       Current Nutrition Intake & Therapies:    Average Meal Intake: Unable to assess  Average Supplements Intake: None Ordered  ADULT DIET; Regular    Anthropometric Measures:  Height: 180.3 cm (5' 10.98\")  Ideal Body Weight (IBW): 172 lbs (78 kg)    Admission Body Weight: 77.5 kg (170 lb 13.7 oz) (10/31 no method first measured)  Current Body Weight: 77.5 kg (170 lb 13.7 oz) (10/31 no method), 99.3 % IBW.  Weight Source: Not Specified  Current BMI (kg/m2): 23.8  Usual Body Weight:  (UTO d/t lack of wt hx on file to assess)     Weight Adjustment For: No Adjustment                 BMI Categories: Normal Weight (BMI 22.0 to 24.9) age over 72    Estimated Daily Nutrient Needs:  Energy Requirements Based On: Formula  Weight Used for Energy Requirements: Current  Energy (kcal/day): 1700-1900kcal (MSJ x1.1-1.2SF)  Weight Used for Protein Requirements: Current  Protein (g/day):

## 2023-11-02 PROBLEM — I48.0 PAF (PAROXYSMAL ATRIAL FIBRILLATION) (HCC): Status: ACTIVE | Noted: 2023-11-02

## 2023-11-02 LAB
ABO/RH: NORMAL
ALBUMIN SERPL-MCNC: 2.8 G/DL (ref 3.5–5.2)
ALP SERPL-CCNC: 159 U/L (ref 40–129)
ALT SERPL-CCNC: <5 U/L (ref 0–40)
ANION GAP SERPL CALCULATED.3IONS-SCNC: 14 MMOL/L (ref 7–16)
ANTIBODY SCREEN: NEGATIVE
ARM BAND NUMBER: NORMAL
AST SERPL-CCNC: 25 U/L (ref 0–39)
BASOPHILS # BLD: 0.03 K/UL (ref 0–0.2)
BASOPHILS NFR BLD: 0 % (ref 0–2)
BILIRUB SERPL-MCNC: 1.2 MG/DL (ref 0–1.2)
BLOOD BANK BLOOD PRODUCT EXPIRATION DATE: NORMAL
BLOOD BANK BLOOD PRODUCT EXPIRATION DATE: NORMAL
BLOOD BANK DISPENSE STATUS: NORMAL
BLOOD BANK DISPENSE STATUS: NORMAL
BLOOD BANK ISBT PRODUCT BLOOD TYPE: 5100
BLOOD BANK ISBT PRODUCT BLOOD TYPE: 9500
BLOOD BANK PRODUCT CODE: NORMAL
BLOOD BANK PRODUCT CODE: NORMAL
BLOOD BANK SAMPLE EXPIRATION: NORMAL
BLOOD BANK UNIT TYPE AND RH: NORMAL
BLOOD BANK UNIT TYPE AND RH: NORMAL
BPU ID: NORMAL
BPU ID: NORMAL
BUN SERPL-MCNC: 4 MG/DL (ref 6–23)
CALCIUM SERPL-MCNC: 8.2 MG/DL (ref 8.6–10.2)
CHLORIDE SERPL-SCNC: 105 MMOL/L (ref 98–107)
CO2 SERPL-SCNC: 18 MMOL/L (ref 22–29)
COMPONENT: NORMAL
COMPONENT: NORMAL
CREAT SERPL-MCNC: 0.5 MG/DL (ref 0.7–1.2)
CROSSMATCH RESULT: NORMAL
CROSSMATCH RESULT: NORMAL
EOSINOPHIL # BLD: 0.16 K/UL (ref 0.05–0.5)
EOSINOPHILS RELATIVE PERCENT: 2 % (ref 0–6)
ERYTHROCYTE [DISTWIDTH] IN BLOOD BY AUTOMATED COUNT: 16.6 % (ref 11.5–15)
GFR SERPL CREATININE-BSD FRML MDRD: >60 ML/MIN/1.73M2
GLUCOSE SERPL-MCNC: 99 MG/DL (ref 74–99)
HBA1C MFR BLD: 4.9 % (ref 4–5.6)
HCT VFR BLD AUTO: 24 % (ref 37–54)
HGB BLD-MCNC: 7.5 G/DL (ref 12.5–16.5)
IMM GRANULOCYTES # BLD AUTO: 0.04 K/UL (ref 0–0.58)
IMM GRANULOCYTES NFR BLD: 1 % (ref 0–5)
LYMPHOCYTES NFR BLD: 1.26 K/UL (ref 1.5–4)
LYMPHOCYTES RELATIVE PERCENT: 15 % (ref 20–42)
MCH RBC QN AUTO: 28 PG (ref 26–35)
MCHC RBC AUTO-ENTMCNC: 31.3 G/DL (ref 32–34.5)
MCV RBC AUTO: 89.6 FL (ref 80–99.9)
MONOCYTES NFR BLD: 0.44 K/UL (ref 0.1–0.95)
MONOCYTES NFR BLD: 5 % (ref 2–12)
NEUTROPHILS NFR BLD: 78 % (ref 43–80)
NEUTS SEG NFR BLD: 6.78 K/UL (ref 1.8–7.3)
PLATELET # BLD AUTO: 219 K/UL (ref 130–450)
PMV BLD AUTO: 10.1 FL (ref 7–12)
POTASSIUM SERPL-SCNC: 3.6 MMOL/L (ref 3.5–5)
PROT SERPL-MCNC: 6.2 G/DL (ref 6.4–8.3)
RBC # BLD AUTO: 2.68 M/UL (ref 3.8–5.8)
SODIUM SERPL-SCNC: 137 MMOL/L (ref 132–146)
TRANSFUSION STATUS: NORMAL
TRANSFUSION STATUS: NORMAL
UNIT DIVISION: 0
UNIT DIVISION: 0
UNIT ISSUE DATE/TIME: NORMAL
UNIT ISSUE DATE/TIME: NORMAL
WBC OTHER # BLD: 8.7 K/UL (ref 4.5–11.5)

## 2023-11-02 PROCEDURE — 83036 HEMOGLOBIN GLYCOSYLATED A1C: CPT

## 2023-11-02 PROCEDURE — 36415 COLL VENOUS BLD VENIPUNCTURE: CPT

## 2023-11-02 PROCEDURE — 99231 SBSQ HOSP IP/OBS SF/LOW 25: CPT | Performed by: NURSE PRACTITIONER

## 2023-11-02 PROCEDURE — 93005 ELECTROCARDIOGRAM TRACING: CPT | Performed by: STUDENT IN AN ORGANIZED HEALTH CARE EDUCATION/TRAINING PROGRAM

## 2023-11-02 PROCEDURE — 2580000003 HC RX 258: Performed by: STUDENT IN AN ORGANIZED HEALTH CARE EDUCATION/TRAINING PROGRAM

## 2023-11-02 PROCEDURE — 2500000003 HC RX 250 WO HCPCS: Performed by: STUDENT IN AN ORGANIZED HEALTH CARE EDUCATION/TRAINING PROGRAM

## 2023-11-02 PROCEDURE — 6360000002 HC RX W HCPCS: Performed by: STUDENT IN AN ORGANIZED HEALTH CARE EDUCATION/TRAINING PROGRAM

## 2023-11-02 PROCEDURE — 85025 COMPLETE CBC W/AUTO DIFF WBC: CPT

## 2023-11-02 PROCEDURE — 6370000000 HC RX 637 (ALT 250 FOR IP): Performed by: CLINICAL NURSE SPECIALIST

## 2023-11-02 PROCEDURE — 80053 COMPREHEN METABOLIC PANEL: CPT

## 2023-11-02 PROCEDURE — 2140000000 HC CCU INTERMEDIATE R&B

## 2023-11-02 PROCEDURE — APPSS60 APP SPLIT SHARED TIME 46-60 MINUTES: Performed by: CLINICAL NURSE SPECIALIST

## 2023-11-02 PROCEDURE — 6370000000 HC RX 637 (ALT 250 FOR IP): Performed by: INTERNAL MEDICINE

## 2023-11-02 RX ORDER — POTASSIUM CHLORIDE 20 MEQ/1
20 TABLET, EXTENDED RELEASE ORAL ONCE
Status: COMPLETED | OUTPATIENT
Start: 2023-11-02 | End: 2023-11-02

## 2023-11-02 RX ORDER — METOPROLOL SUCCINATE 25 MG/1
25 TABLET, EXTENDED RELEASE ORAL DAILY
Status: DISCONTINUED | OUTPATIENT
Start: 2023-11-02 | End: 2023-11-03 | Stop reason: HOSPADM

## 2023-11-02 RX ADMIN — THIAMINE HYDROCHLORIDE 100 MG: 100 INJECTION, SOLUTION INTRAMUSCULAR; INTRAVENOUS at 09:50

## 2023-11-02 RX ADMIN — FOLIC ACID 1 MG: 5 INJECTION, SOLUTION INTRAMUSCULAR; INTRAVENOUS; SUBCUTANEOUS at 09:50

## 2023-11-02 RX ADMIN — SODIUM CHLORIDE, PRESERVATIVE FREE 10 ML: 5 INJECTION INTRAVENOUS at 21:36

## 2023-11-02 RX ADMIN — POTASSIUM CHLORIDE 20 MEQ: 1500 TABLET, EXTENDED RELEASE ORAL at 15:22

## 2023-11-02 RX ADMIN — METOPROLOL SUCCINATE 25 MG: 25 TABLET, EXTENDED RELEASE ORAL at 16:45

## 2023-11-02 RX ADMIN — SODIUM CHLORIDE, PRESERVATIVE FREE 10 ML: 5 INJECTION INTRAVENOUS at 09:49

## 2023-11-02 NOTE — CARE COORDINATION
Discharge order noted. Patient a potential discharge home today pending GI clearance. Plan is for patient to return home, no needs reported and wife to transport.     Electronically signed by PARISH Cunningham on 11/2/2023 at 9:32 AM

## 2023-11-02 NOTE — PROGRESS NOTES
Call placed to Dr. Vo [place] office per Dr. Roselia Batres request. Outpatient appointment made for patient November 8 at 12pm in [facility].

## 2023-11-02 NOTE — CONSULTS
Inpatient Cardiology Consultation      Reason for Consult:  Irregular HR / [address]    Consulting Physician: Dr. Ambrocio Serrano     Requesting Physician:  Dr. Dominique Bowling     Date of Consultation: 11/2/2023    HISTORY OF PRESENT ILLNESS:   Mr. Ovidio Awan is a 68yr old black male, not previously known to [facility]. [facility] ED 10/31/23 1418 via walk in - sent by PCP for abnormal labs Hgb 6.5 K2.9. Guaiac negative ED  Arrival vitals: T98.6 RR 18 HR 71 /62 SPO2 90% on room air weight 170 pounds BMI 23.9  Significant Labs: K2.5>> 3.6 mag 1.3>> 1.9 BUN 4 CR 0.5 lactic acid 3.7 troponin 15 proBNP 278 albumin 2.8 alk phos 159 WBC 8.5 Hgb 6.5 1 U PRBC >> 7.5 . Alcohol level 75 on arrival; drug screen negative  RAD: CT abdomen pelvis with IV contrast: Rectal wall thickening prostatitis. Bladder thick-walled nonspecific. Moderate ascites possibly due to hepatic disease. CT chest without contrast:Prominent atherosclerotic calcifications including coronary arteries. Ectasia of ascending aorta to 4.2 cm. Mild cardiomegaly. R > L gynecomastia. 2 view CXR: no acute process  ED treatment: Normal saline 500 mL bolus, potassium IV 20 mEq, potassium p.o. 40 mEq, Protonix, magnesium 2 g, antibiotics,  11/1/23 1 U PRBC given     Consult - GI:  plan for outpatient capsul endoscopy     Tele strips:  - currently NSR PVC PAC   Patient seen and examined. Recent vitals: T97 RR 18  /101 SPO2 100% on room air    Patient notes that he has had intermittent palpitations since childhood. He denies chest pain, SOB, PERDOMO, dizziness, orthopnea, PND, N/V/D/indigestion. Currently - he feels his fatigue is better & wants to be discharged. Baseline Functional Capacity:  He is able to walk around stores daily with no symptoms. Please note: past medical records were reviewed per electronic medical record (EMR) - see detailed reports under Past Medical/ Surgical History.    Past Medical History:        Prior Cardiology     Electronically signed by CARYN Dacosta on 11/2/2023 at 12:40 PM

## 2023-11-02 NOTE — PLAN OF CARE
Problem: Discharge Planning  Goal: Discharge to home or other facility with appropriate resources  Outcome: Progressing     Problem: Nutrition Deficit:  Goal: Optimize nutritional status  11/2/2023 1002 by Alexey Bennett RN  Outcome: Progressing  11/2/2023 0119 by Triny Liu RN  Outcome: Progressing

## 2023-11-02 NOTE — PROGRESS NOTES
Internal Medicine Progress Note    Patient's name: Jairo Bowen  : 1955  Chief complaints (on day of admission): Abnormal Lab and Fatigue (Patient sent from PCP for abnormal labs K 2.9 and HGB 6.5 )  Admission date: 10/31/2023  Date of service: 2023   Room: [facility]  Primary care physician: Risa Medellin MD  Reason for visit: Follow-up for anemia     Subjective  Nolberto Duncan was seen and examined at bedside     Doing very well   Awake and alert   Eating drinking no issues   States having normal BM now   Patient wants to go home   After my exam nursing notes HR in the 130's   EKG obtained   A Fib RVR noted  Cardiology consulted     Review of Systems  There are no new complaints of chest pain, shortness of breath, abdominal pain, nausea, vomiting, diarrhea, constipation unless otherwise mentioned above.      Hospital Medications  Current Facility-Administered Medications   Medication Dose Route Frequency Provider Last Rate Last Admin    0.9 % sodium chloride infusion   IntraVENous PRN CARYN Oconnor CNP        thiamine (B-1) injection 100 mg  100 mg IntraVENous Daily Radha Castillo MD   100 mg at 88/70/43 6438    folic acid injection 1 mg  1 mg IntraVENous Daily Radha Castillo MD   1 mg at 23 1141    0.9 % sodium chloride infusion   IntraVENous PRN Evelyn Parks APRN - CNP        sodium chloride flush 0.9 % injection 10 mL  10 mL IntraVENous 2 times per day Radha Castillo MD   10 mL at 23 1949    sodium chloride flush 0.9 % injection 10 mL  10 mL IntraVENous PRN Radha Castillo MD        0.9 % sodium chloride infusion   IntraVENous PRN Radha Castillo MD        potassium chloride (KLOR-CON M) extended release tablet 40 mEq  40 mEq Oral PRN Radha Castillo MD        Or    potassium bicarb-citric acid (EFFER-K) effervescent tablet 40 mEq  40 mEq Oral PRN Radha Castillo MD        Or    potassium chloride 10 mEq/100 mL IVPB (Peripheral Line)  10 mEq IntraVENous PRN Radha Castillo MD

## 2023-11-02 NOTE — PROGRESS NOTES
this patient suppose to discharge home today, but his HR was at 130 and his DBP > 100. Dr Ardelle Sandifer notified via "[employer].

## 2023-11-03 VITALS
SYSTOLIC BLOOD PRESSURE: 133 MMHG | HEIGHT: 71 IN | BODY MASS INDEX: 23.93 KG/M2 | OXYGEN SATURATION: 100 % | WEIGHT: 170.9 LBS | TEMPERATURE: 98.4 F | DIASTOLIC BLOOD PRESSURE: 80 MMHG | HEART RATE: 58 BPM | RESPIRATION RATE: 18 BRPM

## 2023-11-03 LAB
ALBUMIN SERPL-MCNC: 2.9 G/DL (ref 3.5–5.2)
ALP SERPL-CCNC: 150 U/L (ref 40–129)
ALT SERPL-CCNC: <5 U/L (ref 0–40)
ANION GAP SERPL CALCULATED.3IONS-SCNC: 12 MMOL/L (ref 7–16)
AST SERPL-CCNC: 31 U/L (ref 0–39)
BASOPHILS # BLD: 0.03 K/UL (ref 0–0.2)
BASOPHILS NFR BLD: 0 % (ref 0–2)
BILIRUB SERPL-MCNC: 1 MG/DL (ref 0–1.2)
BUN SERPL-MCNC: 5 MG/DL (ref 6–23)
CALCIUM SERPL-MCNC: 7.9 MG/DL (ref 8.6–10.2)
CHLORIDE SERPL-SCNC: 106 MMOL/L (ref 98–107)
CO2 SERPL-SCNC: 20 MMOL/L (ref 22–29)
CREAT SERPL-MCNC: 0.5 MG/DL (ref 0.7–1.2)
EKG ATRIAL RATE: 288 BPM
EKG Q-T INTERVAL: 388 MS
EKG QRS DURATION: 74 MS
EKG QTC CALCULATION (BAZETT): 485 MS
EKG R AXIS: -24 DEGREES
EKG T AXIS: -24 DEGREES
EKG VENTRICULAR RATE: 94 BPM
EOSINOPHIL # BLD: 0.16 K/UL (ref 0.05–0.5)
EOSINOPHILS RELATIVE PERCENT: 2 % (ref 0–6)
ERYTHROCYTE [DISTWIDTH] IN BLOOD BY AUTOMATED COUNT: 17.2 % (ref 11.5–15)
GFR SERPL CREATININE-BSD FRML MDRD: >60 ML/MIN/1.73M2
GLUCOSE SERPL-MCNC: 82 MG/DL (ref 74–99)
HCT VFR BLD AUTO: 23.1 % (ref 37–54)
HGB BLD-MCNC: 7.1 G/DL (ref 12.5–16.5)
IMM GRANULOCYTES # BLD AUTO: 0.03 K/UL (ref 0–0.58)
IMM GRANULOCYTES NFR BLD: 0 % (ref 0–5)
LYMPHOCYTES NFR BLD: 1.38 K/UL (ref 1.5–4)
LYMPHOCYTES RELATIVE PERCENT: 19 % (ref 20–42)
MCH RBC QN AUTO: 27.7 PG (ref 26–35)
MCHC RBC AUTO-ENTMCNC: 30.7 G/DL (ref 32–34.5)
MCV RBC AUTO: 90.2 FL (ref 80–99.9)
MONOCYTES NFR BLD: 0.45 K/UL (ref 0.1–0.95)
MONOCYTES NFR BLD: 6 % (ref 2–12)
NEUTROPHILS NFR BLD: 73 % (ref 43–80)
NEUTS SEG NFR BLD: 5.42 K/UL (ref 1.8–7.3)
PLATELET # BLD AUTO: 189 K/UL (ref 130–450)
PMV BLD AUTO: 9.8 FL (ref 7–12)
POTASSIUM SERPL-SCNC: 3.7 MMOL/L (ref 3.5–5)
PROT SERPL-MCNC: 6.2 G/DL (ref 6.4–8.3)
RBC # BLD AUTO: 2.56 M/UL (ref 3.8–5.8)
SODIUM SERPL-SCNC: 138 MMOL/L (ref 132–146)
WBC OTHER # BLD: 7.5 K/UL (ref 4.5–11.5)

## 2023-11-03 PROCEDURE — 2500000003 HC RX 250 WO HCPCS: Performed by: STUDENT IN AN ORGANIZED HEALTH CARE EDUCATION/TRAINING PROGRAM

## 2023-11-03 PROCEDURE — 85025 COMPLETE CBC W/AUTO DIFF WBC: CPT

## 2023-11-03 PROCEDURE — 6370000000 HC RX 637 (ALT 250 FOR IP): Performed by: INTERNAL MEDICINE

## 2023-11-03 PROCEDURE — 6360000002 HC RX W HCPCS: Performed by: STUDENT IN AN ORGANIZED HEALTH CARE EDUCATION/TRAINING PROGRAM

## 2023-11-03 PROCEDURE — 80053 COMPREHEN METABOLIC PANEL: CPT

## 2023-11-03 PROCEDURE — 36415 COLL VENOUS BLD VENIPUNCTURE: CPT

## 2023-11-03 PROCEDURE — 2580000003 HC RX 258: Performed by: STUDENT IN AN ORGANIZED HEALTH CARE EDUCATION/TRAINING PROGRAM

## 2023-11-03 RX ORDER — METOPROLOL SUCCINATE 25 MG/1
25 TABLET, EXTENDED RELEASE ORAL DAILY
Qty: 30 TABLET | Refills: 3 | Status: SHIPPED | OUTPATIENT
Start: 2023-11-04

## 2023-11-03 RX ADMIN — SODIUM CHLORIDE, PRESERVATIVE FREE 10 ML: 5 INJECTION INTRAVENOUS at 10:09

## 2023-11-03 RX ADMIN — THIAMINE HYDROCHLORIDE 100 MG: 100 INJECTION, SOLUTION INTRAMUSCULAR; INTRAVENOUS at 10:08

## 2023-11-03 RX ADMIN — METOPROLOL SUCCINATE 25 MG: 25 TABLET, EXTENDED RELEASE ORAL at 10:09

## 2023-11-03 RX ADMIN — FOLIC ACID 1 MG: 5 INJECTION, SOLUTION INTRAMUSCULAR; INTRAVENOUS; SUBCUTANEOUS at 10:09

## 2023-11-03 NOTE — PROGRESS NOTES
Physician Progress Note      PATIENT:               Ana Moulton  CSN #:                  [id number]  :                       1955  ADMIT DATE:       10/31/2023 5:13 PM  [address] [facility] DATE:  RESPONDING  PROVIDER #:        Bethanie Pulido MD        QUERY TEXT:    Type of Anemia: Please provide further specificity, if known. Clinical indicators include: hgb, gi bleeding, anemia, bleeding, vitamin b-1,   thiamine, ferrous sulfate, iron, fe, melena, hematochezia, hematemesis,   hematuria, hct, acute anemia, blood transfusions, b12, tarry stools, occult   blood, folate deficiency, b-1  Options provided:  -- Anemia due to acute blood loss  -- Anemia due to chronic blood loss  -- Anemia due to iron deficiency  -- Anemia due to postoperative blood loss  -- Anemia due to chronic disease  -- Other - I will add my own diagnosis  -- Disagree - Not applicable / Not valid  -- Disagree - Clinically Unable to determine / Unknown        PROVIDER RESPONSE TEXT:    The patient has anemia due to acute blood loss.       Electronically signed by:  Bethanie Pulido MD 11/3/2023 2:24 PM

## 2023-11-03 NOTE — PROGRESS NOTES
Recent Labs  Lab Results   Component Value Date    WBC 7.5 11/03/2023    HGB 7.1 (L) 11/03/2023    HCT 23.1 (L) 11/03/2023     11/03/2023     11/03/2023    K 3.7 11/03/2023     11/03/2023    CREATININE 0.5 (L) 11/03/2023    BUN 5 (L) 11/03/2023    CO2 20 (L) 11/03/2023    GLUCOSE 82 11/03/2023    ALT <5 11/03/2023    AST 31 11/03/2023    INR 1.3 10/31/2023    LABA1C 4.9 11/02/2023       CT ABDOMEN PELVIS W IV CONTRAST Additional Contrast? None   Final Result   1. A suggestion of rectal wall thickening. May represent proctitis such as   due to radiation or infection, or neoplasm. 2. Also bladder appears thick-walled that may relate to prostate disease but   nonspecific and may be correlated with urinalysis. 3. Moderate ascites of uncertain etiology, possibly due to hepatic disease. RECOMMENDATIONS:   Clinical correlation. CT CHEST WO CONTRAST   Final Result   1. Mild bibasilar atelectasis. 2. Atherosclerotic changes described above. XR CHEST (2 VW)   Final Result   No acute process.              Echocardiogram       Assessment   Active Hospital Problems    Diagnosis     PAF (paroxysmal atrial fibrillation) (HCC) [I48.0]     Acute GI bleeding [K92.2]          Plan  Acute anemia requiring blood transfusions with hx of melena   PPI PO  HH monitoring   Iron Panel   B12 Folate noted  GI consultation   OP capsule endoscopy     Atrial fibrillation with RVR   Consultation to cardiology   BB per cardiology     History of alcohol abuse   Reportedly sober 28 days   Presented for abnormal op labs and reports of dark tarry stools   Reportedly occult blood stool negative at PCP office     Abdominal ascites   CT a/p reviewed from 10/31   Likely 2/2 liver disease associated to above   Pro   Diuresis as able once more stable   Diagnostic and therapeutic paracentesis likely needed at some point     Folate deficiency   Level <2.0  Start IV Folate switch to PO for DC     PT/OT  Home

## 2023-11-03 NOTE — DISCHARGE SUMMARY
Internal Medicine Discharge Summary    NAME: Franki Abbott :  1955  MRN:  [id number] Lyssa Henderson MD    ADMITTED: 10/31/2023   DISCHARGED: 11/3/2023  5:42 PM    ADMITTING PHYSICIAN: No att. providers found    PCP: Marly Hein MD    CONSULTANT(S):   IP CONSULT TO PRIMARY CARE PROVIDER  IP CONSULT TO GI  IP CONSULT TO SOCIAL WORK  IP CONSULT TO CARDIOLOGY     ADMITTING DIAGNOSIS:   Hypokalemia [E87.6]  Hypomagnesemia [E83.42]  Acute GI bleeding [K92.2]  Other fatigue [R53.83]  Anemia, unspecified type [D64.9]     Please see H&P for further details    DISCHARGE DIAGNOSES:   Active Hospital Problems    Diagnosis     PAF (paroxysmal atrial fibrillation) ([address]) [I48.0]     Acute GI bleeding [K92.2]        BRIEF HISTORY OF PRESENT ILLNESS: Franki Abbott is a 80 y.o. male patient of Marly Hein MD who  has no past medical history on file. who originally had concerns including Abnormal Lab and Fatigue (Patient sent from PCP for abnormal labs K 2.9 and HGB 6.5 ). at presentation on 10/31/2023, and was found to have Hypokalemia [E87.6]  Hypomagnesemia [E83.42]  Acute GI bleeding [K92.2]  Other fatigue [R53.83]  Anemia, unspecified type [D64.9] after workup. Please see H&P for further details. HOSPITAL COURSE:   The patient presented to the hospital with the chief complaint of Abnormal Lab and Fatigue (Patient sent from PCP for abnormal labs K 2.9 and HGB 6.5 )  .  The patient was admitted to the hospital.     Acute anemia requiring blood transfusions with hx of melena   PPI PO  HH monitoring   Iron Panel   B12 Folate noted  GI consultation   OP capsule endoscopy      Atrial fibrillation with RVR   Consultation to cardiology   BB per cardiology      History of alcohol abuse   Reportedly sober 28 days   Presented for abnormal op labs and reports of dark tarry stools   Reportedly occult blood stool negative at PCP office      Abdominal ascites   CT a/p reviewed from 10/31   Likely  liver in the last 72 hours. IMAGING:  CT ABDOMEN PELVIS W IV CONTRAST Additional Contrast? None    Result Date: 11/1/2023  EXAMINATION: CT OF THE ABDOMEN AND PELVIS WITH CONTRAST 10/31/2023 6:55 pm TECHNIQUE: CT of the abdomen and pelvis was performed with the administration of intravenous contrast. Multiplanar reformatted images are provided for review. Automated exposure control, iterative reconstruction, and/or weight based adjustment of the mA/kV was utilized to reduce the radiation dose to as low as reasonably achievable. COMPARISON: None. HISTORY: ORDERING SYSTEM PROVIDED HISTORY: pain, melena TECHNOLOGIST PROVIDED HISTORY: Reason for exam:->pain, melena Additional Contrast?->None Decision Support Exception - unselect if not a suspected or confirmed emergency medical condition->Emergency Medical Condition (MA) What reading provider will be dictating this exam?->CRC FINDINGS: Lower Chest:   Evaluated on separate exam. Organs: Moderate ascites. Additional fluid in the retroperitoneum such as presacral space. Probably accounts for fluid adjacent to the gallbladder also. Lower hepatic cyst noted, a couple of renal cysts as well. GI/Bowel: No obstruction. Severe diverticulosis coli. Suspicious for rectal wall thickening. Question wall thickening at the proximal colon but appears to be underdistention and submucosal fat prominence. Pelvis: Apparent prostatic seeds. Bladder or wall appears thickened although it is not well distended for evaluation. Suggest clinical correlation. Penile implant noted, reservoir up to about 6 cm, suggest clinical correlation. Peritoneum/Retroperitoneum: As discussed above regarding fluid. There are moderate to severe atherosclerotic calcifications present. A suggestion of recanalized paraumbilical vein. Bones/Soft Tissues: Overall moderate appearing spinal degenerative changes. 1. A suggestion of rectal wall thickening.   May represent proctitis such as due to radiation or

## 2023-11-03 NOTE — PLAN OF CARE
Problem: Discharge Planning  Goal: Discharge to home or other facility with appropriate resources  11/3/2023 1046 by Collette Bolden RN  Outcome: Progressing  11/3/2023 0154 by Kevin Goetz RN  Outcome: Progressing     Problem: Nutrition Deficit:  Goal: Optimize nutritional status  11/3/2023 1046 by Collette Bolden RN  Outcome: Progressing  11/3/2023 0154 by Kevin Goetz RN  Outcome: Progressing     Problem: Hematologic - Adult  Goal: Maintains hematologic stability  Outcome: Progressing

## 2023-11-03 NOTE — CARE COORDINATION
Patient seen by GI yesterday, hgb 7.1 today; they are okay for patient to discharge once hgb is stabilized. Patient to have capsule endoscopy outpatient. Plan is for patient to return home when medically cleared, no needs reported and wife to transport.     Electronically signed by PARISH Mckinney on 11/3/2023 at 8:24 AM

## 2023-11-03 NOTE — PROGRESS NOTES
CLINICAL PHARMACY NOTE: MEDS TO BEDS    Total # of Prescriptions Filled: 1   The following medications were delivered to the patient:  Metoprolol succ er 25mg    Additional Documentation:  Delivered to patient 11-3-23 @12:32pm

## 2023-11-06 ENCOUNTER — TELEPHONE (OUTPATIENT)
Dept: CARDIOLOGY CLINIC | Age: 68
End: 2023-11-06

## 2023-11-06 NOTE — TELEPHONE ENCOUNTER
CARYN Parks - TABITHA Olivo MA  post hospital visit with Dr. Patsy Santana or NP       Patient was called left voicemail; stating patient needed to call PCP for a referral. Patients never been seen by cardiology. Left a call  back number.

## 2023-11-16 ENCOUNTER — APPOINTMENT (OUTPATIENT)
Dept: CT IMAGING | Age: 68
DRG: 699 | End: 2023-11-16
Payer: MEDICARE

## 2023-11-16 ENCOUNTER — HOSPITAL ENCOUNTER (INPATIENT)
Age: 68
LOS: 4 days | Discharge: HOME | DRG: 699 | End: 2023-11-20
Attending: EMERGENCY MEDICINE | Admitting: STUDENT IN AN ORGANIZED HEALTH CARE EDUCATION/TRAINING PROGRAM
Payer: MEDICARE

## 2023-11-16 DIAGNOSIS — E87.6 HYPOKALEMIA: ICD-10-CM

## 2023-11-16 DIAGNOSIS — R93.5 ABNORMAL CT OF THE ABDOMEN: ICD-10-CM

## 2023-11-16 DIAGNOSIS — R31.0 GROSS HEMATURIA: Primary | ICD-10-CM

## 2023-11-16 DIAGNOSIS — R33.9 URINARY RETENTION: ICD-10-CM

## 2023-11-16 PROBLEM — F10.10 ALCOHOL ABUSE: Status: ACTIVE | Noted: 2023-11-16

## 2023-11-16 PROBLEM — Z85.46 HISTORY OF PROSTATE CANCER: Status: ACTIVE | Noted: 2023-11-16

## 2023-11-16 PROBLEM — R18.8 OTHER ASCITES: Status: ACTIVE | Noted: 2023-11-16

## 2023-11-16 PROBLEM — D64.9 ANEMIA: Status: ACTIVE | Noted: 2023-11-16

## 2023-11-16 PROBLEM — R53.83 OTHER FATIGUE: Status: ACTIVE | Noted: 2023-11-16

## 2023-11-16 PROBLEM — R31.9 HEMATURIA: Status: ACTIVE | Noted: 2023-11-16

## 2023-11-16 PROBLEM — K92.2 ACUTE GI BLEEDING: Status: RESOLVED | Noted: 2023-10-31 | Resolved: 2023-11-16

## 2023-11-16 LAB
ALBUMIN SERPL-MCNC: 3.6 G/DL (ref 3.5–5.2)
ALP SERPL-CCNC: 228 U/L (ref 40–129)
ALT SERPL-CCNC: 10 U/L (ref 0–40)
ANION GAP SERPL CALCULATED.3IONS-SCNC: 16 MMOL/L (ref 7–16)
AST SERPL-CCNC: 56 U/L (ref 0–39)
BASOPHILS # BLD: 0.05 K/UL (ref 0–0.2)
BASOPHILS NFR BLD: 1 % (ref 0–2)
BILIRUB SERPL-MCNC: 0.7 MG/DL (ref 0–1.2)
BILIRUB UR QL STRIP: ABNORMAL
BUN SERPL-MCNC: 6 MG/DL (ref 6–23)
CALCIUM SERPL-MCNC: 8.6 MG/DL (ref 8.6–10.2)
CHLORIDE SERPL-SCNC: 100 MMOL/L (ref 98–107)
CLARITY UR: ABNORMAL
CO2 SERPL-SCNC: 19 MMOL/L (ref 22–29)
COLOR UR: ABNORMAL
CREAT SERPL-MCNC: 0.6 MG/DL (ref 0.7–1.2)
EOSINOPHIL # BLD: 0.04 K/UL (ref 0.05–0.5)
EOSINOPHILS RELATIVE PERCENT: 1 % (ref 0–6)
ERYTHROCYTE [DISTWIDTH] IN BLOOD BY AUTOMATED COUNT: 17.3 % (ref 11.5–15)
GFR SERPL CREATININE-BSD FRML MDRD: >60 ML/MIN/1.73M2
GLUCOSE SERPL-MCNC: 98 MG/DL (ref 74–99)
GLUCOSE UR STRIP-MCNC: 100 MG/DL
HCT VFR BLD AUTO: 30.7 % (ref 37–54)
HGB BLD-MCNC: 9.6 G/DL (ref 12.5–16.5)
HGB UR QL STRIP.AUTO: ABNORMAL
IMM GRANULOCYTES # BLD AUTO: 0.04 K/UL (ref 0–0.58)
IMM GRANULOCYTES NFR BLD: 1 % (ref 0–5)
KETONES UR STRIP-MCNC: ABNORMAL MG/DL
LEUKOCYTE ESTERASE UR QL STRIP: ABNORMAL
LYMPHOCYTES NFR BLD: 2.46 K/UL (ref 1.5–4)
LYMPHOCYTES RELATIVE PERCENT: 32 % (ref 20–42)
MCH RBC QN AUTO: 29.2 PG (ref 26–35)
MCHC RBC AUTO-ENTMCNC: 31.3 G/DL (ref 32–34.5)
MCV RBC AUTO: 93.3 FL (ref 80–99.9)
MONOCYTES NFR BLD: 0.48 K/UL (ref 0.1–0.95)
MONOCYTES NFR BLD: 6 % (ref 2–12)
NEUTROPHILS NFR BLD: 61 % (ref 43–80)
NEUTS SEG NFR BLD: 4.73 K/UL (ref 1.8–7.3)
NITRITE UR QL STRIP: NEGATIVE
PH UR STRIP: 7 [PH] (ref 5–9)
PLATELET # BLD AUTO: 178 K/UL (ref 130–450)
PMV BLD AUTO: 10.9 FL (ref 7–12)
POTASSIUM SERPL-SCNC: 3.4 MMOL/L (ref 3.5–5)
PROT SERPL-MCNC: 7.2 G/DL (ref 6.4–8.3)
PROT UR STRIP-MCNC: >=300 MG/DL
RBC # BLD AUTO: 3.29 M/UL (ref 3.8–5.8)
RBC #/AREA URNS HPF: ABNORMAL /HPF
SODIUM SERPL-SCNC: 135 MMOL/L (ref 132–146)
SP GR UR STRIP: 1.02 (ref 1–1.03)
UROBILINOGEN UR STRIP-ACNC: 4 EU/DL (ref 0–1)
WBC #/AREA URNS HPF: ABNORMAL /HPF
WBC OTHER # BLD: 7.8 K/UL (ref 4.5–11.5)

## 2023-11-16 PROCEDURE — 85025 COMPLETE CBC W/AUTO DIFF WBC: CPT

## 2023-11-16 PROCEDURE — 81001 URINALYSIS AUTO W/SCOPE: CPT

## 2023-11-16 PROCEDURE — 6370000000 HC RX 637 (ALT 250 FOR IP)

## 2023-11-16 PROCEDURE — 1200000000 HC SEMI PRIVATE

## 2023-11-16 PROCEDURE — 2580000003 HC RX 258: Performed by: EMERGENCY MEDICINE

## 2023-11-16 PROCEDURE — 96374 THER/PROPH/DIAG INJ IV PUSH: CPT

## 2023-11-16 PROCEDURE — 87086 URINE CULTURE/COLONY COUNT: CPT

## 2023-11-16 PROCEDURE — 74176 CT ABD & PELVIS W/O CONTRAST: CPT

## 2023-11-16 PROCEDURE — 96375 TX/PRO/DX INJ NEW DRUG ADDON: CPT

## 2023-11-16 PROCEDURE — 80053 COMPREHEN METABOLIC PANEL: CPT

## 2023-11-16 PROCEDURE — 6360000002 HC RX W HCPCS

## 2023-11-16 PROCEDURE — 88112 CYTOPATH CELL ENHANCE TECH: CPT

## 2023-11-16 PROCEDURE — 99285 EMERGENCY DEPT VISIT HI MDM: CPT

## 2023-11-16 RX ORDER — 0.9 % SODIUM CHLORIDE 0.9 %
500 INTRAVENOUS SOLUTION INTRAVENOUS ONCE
Status: COMPLETED | OUTPATIENT
Start: 2023-11-16 | End: 2023-11-16

## 2023-11-16 RX ORDER — LOSARTAN POTASSIUM 50 MG/1
100 TABLET ORAL DAILY
Status: DISCONTINUED | OUTPATIENT
Start: 2023-11-16 | End: 2023-11-20 | Stop reason: HOSPADM

## 2023-11-16 RX ORDER — ACETAMINOPHEN 650 MG/1
650 SUPPOSITORY RECTAL EVERY 6 HOURS PRN
Status: DISCONTINUED | OUTPATIENT
Start: 2023-11-16 | End: 2023-11-20 | Stop reason: HOSPADM

## 2023-11-16 RX ORDER — ONDANSETRON 4 MG/1
4 TABLET, ORALLY DISINTEGRATING ORAL EVERY 8 HOURS PRN
Status: DISCONTINUED | OUTPATIENT
Start: 2023-11-16 | End: 2023-11-20 | Stop reason: HOSPADM

## 2023-11-16 RX ORDER — METOPROLOL SUCCINATE 25 MG/1
25 TABLET, EXTENDED RELEASE ORAL DAILY
Status: DISCONTINUED | OUTPATIENT
Start: 2023-11-16 | End: 2023-11-20 | Stop reason: HOSPADM

## 2023-11-16 RX ORDER — SENNOSIDES A AND B 8.6 MG/1
1 TABLET, FILM COATED ORAL DAILY PRN
Status: DISCONTINUED | OUTPATIENT
Start: 2023-11-16 | End: 2023-11-20 | Stop reason: HOSPADM

## 2023-11-16 RX ORDER — MORPHINE SULFATE 4 MG/ML
4 INJECTION, SOLUTION INTRAMUSCULAR; INTRAVENOUS ONCE
Status: COMPLETED | OUTPATIENT
Start: 2023-11-16 | End: 2023-11-16

## 2023-11-16 RX ORDER — POTASSIUM CHLORIDE 20 MEQ/1
40 TABLET, EXTENDED RELEASE ORAL PRN
Status: DISCONTINUED | OUTPATIENT
Start: 2023-11-16 | End: 2023-11-20 | Stop reason: HOSPADM

## 2023-11-16 RX ORDER — SODIUM CHLORIDE 0.9 % (FLUSH) 0.9 %
10 SYRINGE (ML) INJECTION EVERY 12 HOURS SCHEDULED
Status: DISCONTINUED | OUTPATIENT
Start: 2023-11-16 | End: 2023-11-20 | Stop reason: HOSPADM

## 2023-11-16 RX ORDER — SODIUM CHLORIDE 9 MG/ML
INJECTION, SOLUTION INTRAVENOUS PRN
Status: DISCONTINUED | OUTPATIENT
Start: 2023-11-16 | End: 2023-11-20 | Stop reason: HOSPADM

## 2023-11-16 RX ORDER — MORPHINE SULFATE 2 MG/ML
1 INJECTION, SOLUTION INTRAMUSCULAR; INTRAVENOUS EVERY 4 HOURS PRN
Status: DISCONTINUED | OUTPATIENT
Start: 2023-11-16 | End: 2023-11-20 | Stop reason: HOSPADM

## 2023-11-16 RX ORDER — ONDANSETRON 2 MG/ML
4 INJECTION INTRAMUSCULAR; INTRAVENOUS ONCE
Status: COMPLETED | OUTPATIENT
Start: 2023-11-16 | End: 2023-11-16

## 2023-11-16 RX ORDER — TAMSULOSIN HYDROCHLORIDE 0.4 MG/1
0.4 CAPSULE ORAL DAILY
Status: DISCONTINUED | OUTPATIENT
Start: 2023-11-16 | End: 2023-11-20 | Stop reason: HOSPADM

## 2023-11-16 RX ORDER — SODIUM CHLORIDE, SODIUM LACTATE, POTASSIUM CHLORIDE, CALCIUM CHLORIDE 600; 310; 30; 20 MG/100ML; MG/100ML; MG/100ML; MG/100ML
INJECTION, SOLUTION INTRAVENOUS CONTINUOUS
Status: DISCONTINUED | OUTPATIENT
Start: 2023-11-16 | End: 2023-11-18

## 2023-11-16 RX ORDER — GABAPENTIN 100 MG/1
100 CAPSULE ORAL EVERY 12 HOURS PRN
Status: DISCONTINUED | OUTPATIENT
Start: 2023-11-16 | End: 2023-11-20 | Stop reason: HOSPADM

## 2023-11-16 RX ORDER — ENOXAPARIN SODIUM 100 MG/ML
40 INJECTION SUBCUTANEOUS DAILY
Status: DISCONTINUED | OUTPATIENT
Start: 2023-11-16 | End: 2023-11-20 | Stop reason: HOSPADM

## 2023-11-16 RX ORDER — LIDOCAINE HYDROCHLORIDE 20 MG/ML
JELLY TOPICAL PRN
Status: DISCONTINUED | OUTPATIENT
Start: 2023-11-16 | End: 2023-11-20 | Stop reason: HOSPADM

## 2023-11-16 RX ORDER — ONDANSETRON 2 MG/ML
4 INJECTION INTRAMUSCULAR; INTRAVENOUS EVERY 6 HOURS PRN
Status: DISCONTINUED | OUTPATIENT
Start: 2023-11-16 | End: 2023-11-20 | Stop reason: HOSPADM

## 2023-11-16 RX ORDER — ACETAMINOPHEN 325 MG/1
650 TABLET ORAL EVERY 6 HOURS PRN
Status: DISCONTINUED | OUTPATIENT
Start: 2023-11-16 | End: 2023-11-20 | Stop reason: HOSPADM

## 2023-11-16 RX ORDER — MAGNESIUM SULFATE IN WATER 40 MG/ML
2000 INJECTION, SOLUTION INTRAVENOUS PRN
Status: DISCONTINUED | OUTPATIENT
Start: 2023-11-16 | End: 2023-11-20 | Stop reason: HOSPADM

## 2023-11-16 RX ORDER — POTASSIUM CHLORIDE 7.45 MG/ML
10 INJECTION INTRAVENOUS PRN
Status: DISCONTINUED | OUTPATIENT
Start: 2023-11-16 | End: 2023-11-20 | Stop reason: HOSPADM

## 2023-11-16 RX ORDER — SODIUM CHLORIDE 0.9 % (FLUSH) 0.9 %
10 SYRINGE (ML) INJECTION PRN
Status: DISCONTINUED | OUTPATIENT
Start: 2023-11-16 | End: 2023-11-20 | Stop reason: HOSPADM

## 2023-11-16 RX ADMIN — SODIUM CHLORIDE 500 ML: 9 INJECTION, SOLUTION INTRAVENOUS at 18:55

## 2023-11-16 RX ADMIN — ONDANSETRON 4 MG: 2 INJECTION INTRAMUSCULAR; INTRAVENOUS at 20:15

## 2023-11-16 RX ADMIN — POTASSIUM BICARBONATE 40 MEQ: 782 TABLET, EFFERVESCENT ORAL at 20:15

## 2023-11-16 RX ADMIN — MORPHINE SULFATE 4 MG: 4 INJECTION, SOLUTION INTRAMUSCULAR; INTRAVENOUS at 20:15

## 2023-11-16 ASSESSMENT — ENCOUNTER SYMPTOMS
CHEST TIGHTNESS: 0
ABDOMINAL PAIN: 0
ABDOMINAL DISTENTION: 0
SHORTNESS OF BREATH: 0

## 2023-11-16 ASSESSMENT — LIFESTYLE VARIABLES
HOW OFTEN DO YOU HAVE A DRINK CONTAINING ALCOHOL: MONTHLY OR LESS
HOW MANY STANDARD DRINKS CONTAINING ALCOHOL DO YOU HAVE ON A TYPICAL DAY: 1 OR 2

## 2023-11-16 NOTE — ED PROVIDER NOTES
Heart sounds: Normal heart sounds. No friction rub. No gallop. Pulmonary:      Effort: Pulmonary effort is normal. No respiratory distress. Breath sounds: Normal breath sounds. No wheezing or rales. Abdominal:      General: Abdomen is flat. Palpations: Abdomen is soft. Tenderness: There is no abdominal tenderness. There is no guarding or rebound. Musculoskeletal:         General: No deformity or signs of injury. Cervical back: Normal range of motion. No rigidity. Skin:     General: Skin is warm. Capillary Refill: Capillary refill takes less than 2 seconds. Coloration: Skin is not jaundiced. Neurological:      General: No focal deficit present. Mental Status: He is alert and oriented to person, place, and time. Sensory: No sensory deficit. Motor: No weakness. Psychiatric:         Mood and Affect: Mood normal.         Behavior: Behavior normal.         Thought Content: Thought content normal.          Procedures     Medical Decision Making  Amount and/or Complexity of Data Reviewed  Labs: ordered. Radiology: ordered. Risk  Prescription drug management. Decision regarding hospitalization. ED Course as of 11/16/23 2044 Thu Nov 16, 2023 1913  There is diffuse thickening of the bladder wall up to 6 mm. There is a  amorphous hyper echoic area in the bladder lumen which can represent a  blood. , please correlate clinically for hematuria. Ultrasound could be  helpful in additional evaluation. [JR]      ED Course User Index  [JR] Hung Powell DO       ED Course as of 11/16/23 2044 Thu Nov 16, 2023 1913  There is diffuse thickening of the bladder wall up to 6 mm. There is a  amorphous hyper echoic area in the bladder lumen which can represent a  blood. , please correlate clinically for hematuria. Ultrasound could be  helpful in additional evaluation.  [JR]      ED Course User Index  [JR] Hung Powell DO

## 2023-11-17 LAB
ALBUMIN SERPL-MCNC: 3.1 G/DL (ref 3.5–5.2)
ALP SERPL-CCNC: 204 U/L (ref 40–129)
ALT SERPL-CCNC: 10 U/L (ref 0–40)
ANION GAP SERPL CALCULATED.3IONS-SCNC: 8 MMOL/L (ref 7–16)
AST SERPL-CCNC: 49 U/L (ref 0–39)
BASOPHILS # BLD: 0.03 K/UL (ref 0–0.2)
BASOPHILS NFR BLD: 0 % (ref 0–2)
BILIRUB SERPL-MCNC: 0.9 MG/DL (ref 0–1.2)
BUN SERPL-MCNC: 6 MG/DL (ref 6–23)
CALCIUM SERPL-MCNC: 8.3 MG/DL (ref 8.6–10.2)
CHLORIDE SERPL-SCNC: 103 MMOL/L (ref 98–107)
CO2 SERPL-SCNC: 25 MMOL/L (ref 22–29)
CREAT SERPL-MCNC: 0.5 MG/DL (ref 0.7–1.2)
EOSINOPHIL # BLD: 0.06 K/UL (ref 0.05–0.5)
EOSINOPHILS RELATIVE PERCENT: 1 % (ref 0–6)
ERYTHROCYTE [DISTWIDTH] IN BLOOD BY AUTOMATED COUNT: 17.2 % (ref 11.5–15)
GFR SERPL CREATININE-BSD FRML MDRD: >60 ML/MIN/1.73M2
GLUCOSE SERPL-MCNC: 87 MG/DL (ref 74–99)
HCT VFR BLD AUTO: 26.1 % (ref 37–54)
HGB BLD-MCNC: 8 G/DL (ref 12.5–16.5)
IMM GRANULOCYTES # BLD AUTO: 0.04 K/UL (ref 0–0.58)
IMM GRANULOCYTES NFR BLD: 1 % (ref 0–5)
LYMPHOCYTES NFR BLD: 1.23 K/UL (ref 1.5–4)
LYMPHOCYTES RELATIVE PERCENT: 16 % (ref 20–42)
MCH RBC QN AUTO: 28.8 PG (ref 26–35)
MCHC RBC AUTO-ENTMCNC: 30.7 G/DL (ref 32–34.5)
MCV RBC AUTO: 93.9 FL (ref 80–99.9)
MONOCYTES NFR BLD: 0.6 K/UL (ref 0.1–0.95)
MONOCYTES NFR BLD: 8 % (ref 2–12)
NEUTROPHILS NFR BLD: 75 % (ref 43–80)
NEUTS SEG NFR BLD: 5.88 K/UL (ref 1.8–7.3)
PLATELET # BLD AUTO: 133 K/UL (ref 130–450)
PMV BLD AUTO: 10.6 FL (ref 7–12)
POTASSIUM SERPL-SCNC: 4.8 MMOL/L (ref 3.5–5)
PROT SERPL-MCNC: 6.5 G/DL (ref 6.4–8.3)
RBC # BLD AUTO: 2.78 M/UL (ref 3.8–5.8)
SODIUM SERPL-SCNC: 136 MMOL/L (ref 132–146)
WBC OTHER # BLD: 7.8 K/UL (ref 4.5–11.5)

## 2023-11-17 PROCEDURE — 85025 COMPLETE CBC W/AUTO DIFF WBC: CPT

## 2023-11-17 PROCEDURE — 80053 COMPREHEN METABOLIC PANEL: CPT

## 2023-11-17 PROCEDURE — 2500000003 HC RX 250 WO HCPCS: Performed by: STUDENT IN AN ORGANIZED HEALTH CARE EDUCATION/TRAINING PROGRAM

## 2023-11-17 PROCEDURE — 1200000000 HC SEMI PRIVATE

## 2023-11-17 PROCEDURE — 6360000002 HC RX W HCPCS: Performed by: NURSE PRACTITIONER

## 2023-11-17 PROCEDURE — 36415 COLL VENOUS BLD VENIPUNCTURE: CPT

## 2023-11-17 PROCEDURE — 2580000003 HC RX 258: Performed by: NURSE PRACTITIONER

## 2023-11-17 PROCEDURE — 6370000000 HC RX 637 (ALT 250 FOR IP): Performed by: NURSE PRACTITIONER

## 2023-11-17 PROCEDURE — 99222 1ST HOSP IP/OBS MODERATE 55: CPT | Performed by: STUDENT IN AN ORGANIZED HEALTH CARE EDUCATION/TRAINING PROGRAM

## 2023-11-17 PROCEDURE — 2580000003 HC RX 258: Performed by: STUDENT IN AN ORGANIZED HEALTH CARE EDUCATION/TRAINING PROGRAM

## 2023-11-17 PROCEDURE — 6360000002 HC RX W HCPCS: Performed by: STUDENT IN AN ORGANIZED HEALTH CARE EDUCATION/TRAINING PROGRAM

## 2023-11-17 RX ORDER — MORPHINE SULFATE 2 MG/ML
1 INJECTION, SOLUTION INTRAMUSCULAR; INTRAVENOUS ONCE
Status: COMPLETED | OUTPATIENT
Start: 2023-11-17 | End: 2023-11-17

## 2023-11-17 RX ORDER — FOLIC ACID 5 MG/ML
1 INJECTION, SOLUTION INTRAMUSCULAR; INTRAVENOUS; SUBCUTANEOUS DAILY
Status: DISCONTINUED | OUTPATIENT
Start: 2023-11-17 | End: 2023-11-20 | Stop reason: HOSPADM

## 2023-11-17 RX ORDER — THIAMINE HYDROCHLORIDE 100 MG/ML
100 INJECTION, SOLUTION INTRAMUSCULAR; INTRAVENOUS DAILY
Status: DISCONTINUED | OUTPATIENT
Start: 2023-11-17 | End: 2023-11-20 | Stop reason: HOSPADM

## 2023-11-17 RX ADMIN — SODIUM CHLORIDE, PRESERVATIVE FREE 10 ML: 5 INJECTION INTRAVENOUS at 00:14

## 2023-11-17 RX ADMIN — THIAMINE HYDROCHLORIDE 100 MG: 100 INJECTION, SOLUTION INTRAMUSCULAR; INTRAVENOUS at 08:23

## 2023-11-17 RX ADMIN — MORPHINE SULFATE 1 MG: 2 INJECTION, SOLUTION INTRAMUSCULAR; INTRAVENOUS at 18:47

## 2023-11-17 RX ADMIN — SODIUM CHLORIDE, PRESERVATIVE FREE 10 ML: 5 INJECTION INTRAVENOUS at 08:04

## 2023-11-17 RX ADMIN — SODIUM CHLORIDE, POTASSIUM CHLORIDE, SODIUM LACTATE AND CALCIUM CHLORIDE: 600; 310; 30; 20 INJECTION, SOLUTION INTRAVENOUS at 18:47

## 2023-11-17 RX ADMIN — MORPHINE SULFATE 1 MG: 2 INJECTION, SOLUTION INTRAMUSCULAR; INTRAVENOUS at 14:18

## 2023-11-17 RX ADMIN — FOLIC ACID 1 MG: 5 INJECTION, SOLUTION INTRAMUSCULAR; INTRAVENOUS; SUBCUTANEOUS at 08:27

## 2023-11-17 RX ADMIN — SODIUM CHLORIDE, POTASSIUM CHLORIDE, SODIUM LACTATE AND CALCIUM CHLORIDE: 600; 310; 30; 20 INJECTION, SOLUTION INTRAVENOUS at 06:18

## 2023-11-17 RX ADMIN — MORPHINE SULFATE 1 MG: 2 INJECTION, SOLUTION INTRAMUSCULAR; INTRAVENOUS at 00:14

## 2023-11-17 RX ADMIN — MORPHINE SULFATE 1 MG: 2 INJECTION, SOLUTION INTRAMUSCULAR; INTRAVENOUS at 07:21

## 2023-11-17 RX ADMIN — LOSARTAN POTASSIUM 100 MG: 50 TABLET, FILM COATED ORAL at 08:04

## 2023-11-17 RX ADMIN — TAMSULOSIN HYDROCHLORIDE 0.4 MG: 0.4 CAPSULE ORAL at 08:04

## 2023-11-17 RX ADMIN — WATER 1000 MG: 1 INJECTION INTRAMUSCULAR; INTRAVENOUS; SUBCUTANEOUS at 08:13

## 2023-11-17 RX ADMIN — MORPHINE SULFATE 1 MG: 2 INJECTION, SOLUTION INTRAMUSCULAR; INTRAVENOUS at 09:19

## 2023-11-17 RX ADMIN — METOPROLOL SUCCINATE 25 MG: 25 TABLET, EXTENDED RELEASE ORAL at 08:04

## 2023-11-17 RX ADMIN — MORPHINE SULFATE 1 MG: 2 INJECTION, SOLUTION INTRAMUSCULAR; INTRAVENOUS at 23:17

## 2023-11-17 ASSESSMENT — PAIN DESCRIPTION - PAIN TYPE: TYPE: ACUTE PAIN

## 2023-11-17 ASSESSMENT — PAIN SCALES - GENERAL
PAINLEVEL_OUTOF10: 9
PAINLEVEL_OUTOF10: 8
PAINLEVEL_OUTOF10: 8
PAINLEVEL_OUTOF10: 7
PAINLEVEL_OUTOF10: 10
PAINLEVEL_OUTOF10: 7
PAINLEVEL_OUTOF10: 8

## 2023-11-17 ASSESSMENT — PAIN DESCRIPTION - ORIENTATION: ORIENTATION: MID

## 2023-11-17 ASSESSMENT — PAIN DESCRIPTION - LOCATION
LOCATION: PERINEUM
LOCATION: PELVIS;PENIS

## 2023-11-17 ASSESSMENT — PAIN DESCRIPTION - ONSET: ONSET: ON-GOING

## 2023-11-17 ASSESSMENT — PAIN DESCRIPTION - DESCRIPTORS
DESCRIPTORS: ACHING;DISCOMFORT
DESCRIPTORS: ACHING;DISCOMFORT;SORE
DESCRIPTORS: ACHING;DISCOMFORT

## 2023-11-17 ASSESSMENT — PAIN - FUNCTIONAL ASSESSMENT: PAIN_FUNCTIONAL_ASSESSMENT: PREVENTS OR INTERFERES SOME ACTIVE ACTIVITIES AND ADLS

## 2023-11-17 ASSESSMENT — PAIN DESCRIPTION - FREQUENCY: FREQUENCY: CONTINUOUS

## 2023-11-17 NOTE — H&P
Consultation to urology   Ucx in progress from 11/16/23   Rocephin for now     Abdominal ascites   Consultation to GI   Hold Lovenox likely needs paracentesis   SCDs ordered     Colitis / Proctitis on CT imaging   Consultation to GI     Possible pelvic bone infection / abnormality on CT imaging / History of prostate Ca   Hem onc consult for opinion appreciated     Hx of melena   Recently admitted and evaluated by GI planned for OP capsule endoscopy     Atrial fibrillation   Continue home regimen   Recently admitted with RVR seen by cardiology     History of alcohol abuse   Thiamine and folate   Monitor for possible etoh wd   Patient reports being sober over 40 days now     Folate deficiency   Level <2.0 last admission   IV Folate     PT/OT  Home medications to be reconciled and confirmed prior to being ordered  DVT prophylaxis SCDs   Code Status Full   Discharge plan: TBD pending clinical improvement     Genie Nichols PGY 3     Addendum: I have personally participated in an independent face-to-face history and physical exam on the date of service with the patient. I have independently formulated and executed the above assessment and plan. The vitals, labs, imaging, medications and treatment plan were reviewed independently by myself in addition to with the resident doctor. I agree with the above documentation and treatment plan     Electronically signed by Rafa Duran MD on 11/17/2023 at 3:11 PM    I can be reached through [address] [place].

## 2023-11-17 NOTE — PLAN OF CARE
Problem: Safety - Adult  Goal: Free from fall injury  11/17/2023 1140 by Hung Manrique RN  Outcome: Progressing     Problem: Pain  Goal: Verbalizes/displays adequate comfort level or baseline comfort level  Outcome: Progressing

## 2023-11-17 NOTE — ED NOTES
Anaya catheter flushed with 80cc of NS and drained with multiple blood clots removed. Anaya continues to clot off without continuous irrigation. RN bladder scanned patient and found to have about 90cc.       Karine Cisse RN  11/17/23 0114
Assumed care of patient at this time. Patient has arboleda in place by previous RN.       Fabio Kang RN  11/17/23 0376
Report called to RN on unit and patient ready for transportation     Zackery Dai, [address]  11/17/23 1675
This RN placed urinary catheter, catheter keeps getting clogged with clots and patient is passing clots around the catheter. Dr contacted the urologist and urologist said to leave it and he will see the patient in the morning.  Urology is admitting patient due to need to follow up on CT results     Todd Trevino, [address] [place] [address]  11/16/23 2027
decreased ability to use arms for pushing/pulling

## 2023-11-18 ENCOUNTER — APPOINTMENT (OUTPATIENT)
Dept: ULTRASOUND IMAGING | Age: 68
DRG: 699 | End: 2023-11-18
Payer: MEDICARE

## 2023-11-18 LAB
ALBUMIN SERPL-MCNC: 2.9 G/DL (ref 3.5–5.2)
ALP SERPL-CCNC: 189 U/L (ref 40–129)
ALT SERPL-CCNC: 7 U/L (ref 0–40)
ANION GAP SERPL CALCULATED.3IONS-SCNC: 13 MMOL/L (ref 7–16)
AST SERPL-CCNC: 32 U/L (ref 0–39)
BASOPHILS # BLD: 0.03 K/UL (ref 0–0.2)
BASOPHILS NFR BLD: 0 % (ref 0–2)
BILIRUB SERPL-MCNC: 1.2 MG/DL (ref 0–1.2)
BUN SERPL-MCNC: 6 MG/DL (ref 6–23)
CALCIUM SERPL-MCNC: 8.6 MG/DL (ref 8.6–10.2)
CHLORIDE SERPL-SCNC: 98 MMOL/L (ref 98–107)
CO2 SERPL-SCNC: 21 MMOL/L (ref 22–29)
CREAT SERPL-MCNC: 0.5 MG/DL (ref 0.7–1.2)
EOSINOPHIL # BLD: 0.03 K/UL (ref 0.05–0.5)
EOSINOPHILS RELATIVE PERCENT: 0 % (ref 0–6)
ERYTHROCYTE [DISTWIDTH] IN BLOOD BY AUTOMATED COUNT: 17 % (ref 11.5–15)
GFR SERPL CREATININE-BSD FRML MDRD: >60 ML/MIN/1.73M2
GLUCOSE SERPL-MCNC: 94 MG/DL (ref 74–99)
HCT VFR BLD AUTO: 26.2 % (ref 37–54)
HGB BLD-MCNC: 8.2 G/DL (ref 12.5–16.5)
IMM GRANULOCYTES # BLD AUTO: 0.04 K/UL (ref 0–0.58)
IMM GRANULOCYTES NFR BLD: 0 % (ref 0–5)
LYMPHOCYTES NFR BLD: 1.85 K/UL (ref 1.5–4)
LYMPHOCYTES RELATIVE PERCENT: 19 % (ref 20–42)
MCH RBC QN AUTO: 29.3 PG (ref 26–35)
MCHC RBC AUTO-ENTMCNC: 31.3 G/DL (ref 32–34.5)
MCV RBC AUTO: 93.6 FL (ref 80–99.9)
MICROORGANISM SPEC CULT: NO GROWTH
MONOCYTES NFR BLD: 0.75 K/UL (ref 0.1–0.95)
MONOCYTES NFR BLD: 8 % (ref 2–12)
NEUTROPHILS NFR BLD: 73 % (ref 43–80)
NEUTS SEG NFR BLD: 7.29 K/UL (ref 1.8–7.3)
PLATELET # BLD AUTO: 143 K/UL (ref 130–450)
PMV BLD AUTO: 11.3 FL (ref 7–12)
POTASSIUM SERPL-SCNC: 4.1 MMOL/L (ref 3.5–5)
PROT SERPL-MCNC: 6.2 G/DL (ref 6.4–8.3)
RBC # BLD AUTO: 2.8 M/UL (ref 3.8–5.8)
SODIUM SERPL-SCNC: 132 MMOL/L (ref 132–146)
SPECIMEN DESCRIPTION: NORMAL
WBC OTHER # BLD: 10 K/UL (ref 4.5–11.5)

## 2023-11-18 PROCEDURE — 86803 HEPATITIS C AB TEST: CPT

## 2023-11-18 PROCEDURE — 80053 COMPREHEN METABOLIC PANEL: CPT

## 2023-11-18 PROCEDURE — 76981 USE PARENCHYMA: CPT

## 2023-11-18 PROCEDURE — 6360000002 HC RX W HCPCS: Performed by: STUDENT IN AN ORGANIZED HEALTH CARE EDUCATION/TRAINING PROGRAM

## 2023-11-18 PROCEDURE — 2580000003 HC RX 258: Performed by: STUDENT IN AN ORGANIZED HEALTH CARE EDUCATION/TRAINING PROGRAM

## 2023-11-18 PROCEDURE — 86255 FLUORESCENT ANTIBODY SCREEN: CPT

## 2023-11-18 PROCEDURE — 2580000003 HC RX 258: Performed by: NURSE PRACTITIONER

## 2023-11-18 PROCEDURE — P9047 ALBUMIN (HUMAN), 25%, 50ML: HCPCS | Performed by: STUDENT IN AN ORGANIZED HEALTH CARE EDUCATION/TRAINING PROGRAM

## 2023-11-18 PROCEDURE — 87340 HEPATITIS B SURFACE AG IA: CPT

## 2023-11-18 PROCEDURE — 82390 ASSAY OF CERULOPLASMIN: CPT

## 2023-11-18 PROCEDURE — 1200000000 HC SEMI PRIVATE

## 2023-11-18 PROCEDURE — 6360000002 HC RX W HCPCS: Performed by: NURSE PRACTITIONER

## 2023-11-18 PROCEDURE — 36415 COLL VENOUS BLD VENIPUNCTURE: CPT

## 2023-11-18 PROCEDURE — 85025 COMPLETE CBC W/AUTO DIFF WBC: CPT

## 2023-11-18 PROCEDURE — 6370000000 HC RX 637 (ALT 250 FOR IP): Performed by: NURSE PRACTITIONER

## 2023-11-18 PROCEDURE — 2500000003 HC RX 250 WO HCPCS: Performed by: STUDENT IN AN ORGANIZED HEALTH CARE EDUCATION/TRAINING PROGRAM

## 2023-11-18 RX ORDER — ALBUMIN (HUMAN) 12.5 G/50ML
25 SOLUTION INTRAVENOUS EVERY 8 HOURS
Status: DISCONTINUED | OUTPATIENT
Start: 2023-11-18 | End: 2023-11-20 | Stop reason: HOSPADM

## 2023-11-18 RX ADMIN — LOSARTAN POTASSIUM 100 MG: 50 TABLET, FILM COATED ORAL at 09:29

## 2023-11-18 RX ADMIN — MORPHINE SULFATE 1 MG: 2 INJECTION, SOLUTION INTRAMUSCULAR; INTRAVENOUS at 03:21

## 2023-11-18 RX ADMIN — THIAMINE HYDROCHLORIDE 100 MG: 100 INJECTION, SOLUTION INTRAMUSCULAR; INTRAVENOUS at 09:28

## 2023-11-18 RX ADMIN — SODIUM CHLORIDE, PRESERVATIVE FREE 10 ML: 5 INJECTION INTRAVENOUS at 09:30

## 2023-11-18 RX ADMIN — TAMSULOSIN HYDROCHLORIDE 0.4 MG: 0.4 CAPSULE ORAL at 09:29

## 2023-11-18 RX ADMIN — METOPROLOL SUCCINATE 25 MG: 25 TABLET, EXTENDED RELEASE ORAL at 09:29

## 2023-11-18 RX ADMIN — SODIUM CHLORIDE, POTASSIUM CHLORIDE, SODIUM LACTATE AND CALCIUM CHLORIDE: 600; 310; 30; 20 INJECTION, SOLUTION INTRAVENOUS at 09:38

## 2023-11-18 RX ADMIN — ALBUMIN (HUMAN) 25 G: 0.25 INJECTION, SOLUTION INTRAVENOUS at 18:01

## 2023-11-18 RX ADMIN — SODIUM CHLORIDE, PRESERVATIVE FREE 10 ML: 5 INJECTION INTRAVENOUS at 19:02

## 2023-11-18 RX ADMIN — FOLIC ACID 1 MG: 5 INJECTION, SOLUTION INTRAMUSCULAR; INTRAVENOUS; SUBCUTANEOUS at 09:28

## 2023-11-18 RX ADMIN — MORPHINE SULFATE 1 MG: 2 INJECTION, SOLUTION INTRAMUSCULAR; INTRAVENOUS at 09:28

## 2023-11-18 RX ADMIN — MORPHINE SULFATE 1 MG: 2 INJECTION, SOLUTION INTRAMUSCULAR; INTRAVENOUS at 15:22

## 2023-11-18 RX ADMIN — WATER 1000 MG: 1 INJECTION INTRAMUSCULAR; INTRAVENOUS; SUBCUTANEOUS at 09:27

## 2023-11-18 RX ADMIN — MORPHINE SULFATE 1 MG: 2 INJECTION, SOLUTION INTRAMUSCULAR; INTRAVENOUS at 19:35

## 2023-11-18 ASSESSMENT — PAIN SCALES - GENERAL
PAINLEVEL_OUTOF10: 8
PAINLEVEL_OUTOF10: 0

## 2023-11-18 ASSESSMENT — PAIN - FUNCTIONAL ASSESSMENT
PAIN_FUNCTIONAL_ASSESSMENT: ACTIVITIES ARE NOT PREVENTED
PAIN_FUNCTIONAL_ASSESSMENT: PREVENTS OR INTERFERES WITH MANY ACTIVE NOT PASSIVE ACTIVITIES

## 2023-11-18 ASSESSMENT — PAIN DESCRIPTION - LOCATION
LOCATION: SCROTUM
LOCATION: ABDOMEN
LOCATION: PERINEUM

## 2023-11-18 ASSESSMENT — PAIN DESCRIPTION - PAIN TYPE: TYPE: ACUTE PAIN

## 2023-11-18 ASSESSMENT — PAIN DESCRIPTION - DESCRIPTORS
DESCRIPTORS: SPASM
DESCRIPTORS: ACHING;CRAMPING;DISCOMFORT
DESCRIPTORS: ACHING;DISCOMFORT

## 2023-11-18 ASSESSMENT — PAIN DESCRIPTION - ORIENTATION
ORIENTATION: MID
ORIENTATION: RIGHT;LEFT;LOWER

## 2023-11-19 LAB
ALBUMIN SERPL-MCNC: 3.2 G/DL (ref 3.5–5.2)
ALP SERPL-CCNC: 161 U/L (ref 40–129)
ALT SERPL-CCNC: 6 U/L (ref 0–40)
ANION GAP SERPL CALCULATED.3IONS-SCNC: 14 MMOL/L (ref 7–16)
AST SERPL-CCNC: 34 U/L (ref 0–39)
BASOPHILS # BLD: 0.03 K/UL (ref 0–0.2)
BASOPHILS NFR BLD: 0 % (ref 0–2)
BILIRUB SERPL-MCNC: 1.2 MG/DL (ref 0–1.2)
BUN SERPL-MCNC: 7 MG/DL (ref 6–23)
CALCIUM SERPL-MCNC: 8.7 MG/DL (ref 8.6–10.2)
CHLORIDE SERPL-SCNC: 102 MMOL/L (ref 98–107)
CO2 SERPL-SCNC: 21 MMOL/L (ref 22–29)
CREAT SERPL-MCNC: 0.6 MG/DL (ref 0.7–1.2)
EOSINOPHIL # BLD: 0.07 K/UL (ref 0.05–0.5)
EOSINOPHILS RELATIVE PERCENT: 1 % (ref 0–6)
ERYTHROCYTE [DISTWIDTH] IN BLOOD BY AUTOMATED COUNT: 17.1 % (ref 11.5–15)
GFR SERPL CREATININE-BSD FRML MDRD: >60 ML/MIN/1.73M2
GLUCOSE SERPL-MCNC: 78 MG/DL (ref 74–99)
HCT VFR BLD AUTO: 23.1 % (ref 37–54)
HCT VFR BLD AUTO: 24.9 % (ref 37–54)
HGB BLD-MCNC: 7.1 G/DL (ref 12.5–16.5)
HGB BLD-MCNC: 7.9 G/DL (ref 12.5–16.5)
IMM GRANULOCYTES # BLD AUTO: 0.03 K/UL (ref 0–0.58)
IMM GRANULOCYTES NFR BLD: 0 % (ref 0–5)
LYMPHOCYTES NFR BLD: 1.35 K/UL (ref 1.5–4)
LYMPHOCYTES RELATIVE PERCENT: 19 % (ref 20–42)
MCH RBC QN AUTO: 28.9 PG (ref 26–35)
MCHC RBC AUTO-ENTMCNC: 30.7 G/DL (ref 32–34.5)
MCV RBC AUTO: 93.9 FL (ref 80–99.9)
MONOCYTES NFR BLD: 0.55 K/UL (ref 0.1–0.95)
MONOCYTES NFR BLD: 8 % (ref 2–12)
NEUTROPHILS NFR BLD: 71 % (ref 43–80)
NEUTS SEG NFR BLD: 4.96 K/UL (ref 1.8–7.3)
PLATELET, FLUORESCENCE: 143 K/UL (ref 130–450)
PMV BLD AUTO: 11.2 FL (ref 7–12)
POTASSIUM SERPL-SCNC: 3.6 MMOL/L (ref 3.5–5)
PROT SERPL-MCNC: 6.5 G/DL (ref 6.4–8.3)
PSA SERPL-MCNC: <0.01 NG/ML (ref 0–4)
RBC # BLD AUTO: 2.46 M/UL (ref 3.8–5.8)
SODIUM SERPL-SCNC: 137 MMOL/L (ref 132–146)
WBC OTHER # BLD: 7 K/UL (ref 4.5–11.5)

## 2023-11-19 PROCEDURE — 85018 HEMOGLOBIN: CPT

## 2023-11-19 PROCEDURE — 6360000002 HC RX W HCPCS: Performed by: STUDENT IN AN ORGANIZED HEALTH CARE EDUCATION/TRAINING PROGRAM

## 2023-11-19 PROCEDURE — G0103 PSA SCREENING: HCPCS

## 2023-11-19 PROCEDURE — 6370000000 HC RX 637 (ALT 250 FOR IP): Performed by: NURSE PRACTITIONER

## 2023-11-19 PROCEDURE — 1200000000 HC SEMI PRIVATE

## 2023-11-19 PROCEDURE — 36415 COLL VENOUS BLD VENIPUNCTURE: CPT

## 2023-11-19 PROCEDURE — 2500000003 HC RX 250 WO HCPCS: Performed by: STUDENT IN AN ORGANIZED HEALTH CARE EDUCATION/TRAINING PROGRAM

## 2023-11-19 PROCEDURE — 2580000003 HC RX 258: Performed by: NURSE PRACTITIONER

## 2023-11-19 PROCEDURE — 85014 HEMATOCRIT: CPT

## 2023-11-19 PROCEDURE — 85025 COMPLETE CBC W/AUTO DIFF WBC: CPT

## 2023-11-19 PROCEDURE — 2580000003 HC RX 258: Performed by: STUDENT IN AN ORGANIZED HEALTH CARE EDUCATION/TRAINING PROGRAM

## 2023-11-19 PROCEDURE — P9047 ALBUMIN (HUMAN), 25%, 50ML: HCPCS | Performed by: STUDENT IN AN ORGANIZED HEALTH CARE EDUCATION/TRAINING PROGRAM

## 2023-11-19 PROCEDURE — 6360000002 HC RX W HCPCS: Performed by: NURSE PRACTITIONER

## 2023-11-19 PROCEDURE — 80053 COMPREHEN METABOLIC PANEL: CPT

## 2023-11-19 RX ADMIN — THIAMINE HYDROCHLORIDE 100 MG: 100 INJECTION, SOLUTION INTRAMUSCULAR; INTRAVENOUS at 08:12

## 2023-11-19 RX ADMIN — TAMSULOSIN HYDROCHLORIDE 0.4 MG: 0.4 CAPSULE ORAL at 08:11

## 2023-11-19 RX ADMIN — MORPHINE SULFATE 1 MG: 2 INJECTION, SOLUTION INTRAMUSCULAR; INTRAVENOUS at 20:47

## 2023-11-19 RX ADMIN — ALBUMIN (HUMAN) 25 G: 0.25 INJECTION, SOLUTION INTRAVENOUS at 10:29

## 2023-11-19 RX ADMIN — ALBUMIN (HUMAN) 25 G: 0.25 INJECTION, SOLUTION INTRAVENOUS at 02:02

## 2023-11-19 RX ADMIN — SODIUM CHLORIDE, PRESERVATIVE FREE 10 ML: 5 INJECTION INTRAVENOUS at 19:53

## 2023-11-19 RX ADMIN — MORPHINE SULFATE 1 MG: 2 INJECTION, SOLUTION INTRAMUSCULAR; INTRAVENOUS at 08:12

## 2023-11-19 RX ADMIN — ALBUMIN (HUMAN) 25 G: 0.25 INJECTION, SOLUTION INTRAVENOUS at 18:33

## 2023-11-19 RX ADMIN — FOLIC ACID 1 MG: 5 INJECTION, SOLUTION INTRAMUSCULAR; INTRAVENOUS; SUBCUTANEOUS at 08:12

## 2023-11-19 RX ADMIN — MORPHINE SULFATE 1 MG: 2 INJECTION, SOLUTION INTRAMUSCULAR; INTRAVENOUS at 16:33

## 2023-11-19 RX ADMIN — WATER 1000 MG: 1 INJECTION INTRAMUSCULAR; INTRAVENOUS; SUBCUTANEOUS at 08:12

## 2023-11-19 RX ADMIN — SODIUM CHLORIDE, PRESERVATIVE FREE 10 ML: 5 INJECTION INTRAVENOUS at 08:13

## 2023-11-19 RX ADMIN — METOPROLOL SUCCINATE 25 MG: 25 TABLET, EXTENDED RELEASE ORAL at 08:11

## 2023-11-19 RX ADMIN — MORPHINE SULFATE 1 MG: 2 INJECTION, SOLUTION INTRAMUSCULAR; INTRAVENOUS at 12:35

## 2023-11-19 RX ADMIN — LOSARTAN POTASSIUM 100 MG: 50 TABLET, FILM COATED ORAL at 08:11

## 2023-11-19 ASSESSMENT — PAIN DESCRIPTION - LOCATION
LOCATION: PERINEUM

## 2023-11-19 ASSESSMENT — PAIN DESCRIPTION - DESCRIPTORS
DESCRIPTORS: SPASM
DESCRIPTORS: SPASM;DISCOMFORT

## 2023-11-19 ASSESSMENT — PAIN SCALES - GENERAL
PAINLEVEL_OUTOF10: 6
PAINLEVEL_OUTOF10: 7
PAINLEVEL_OUTOF10: 0
PAINLEVEL_OUTOF10: 7
PAINLEVEL_OUTOF10: 7

## 2023-11-19 ASSESSMENT — PAIN DESCRIPTION - ORIENTATION
ORIENTATION: LOWER

## 2023-11-19 NOTE — PLAN OF CARE
Problem: Safety - Adult  Goal: Free from fall injury  11/18/2023 1945 by Johanna Lowe RN  Outcome: Progressing  11/18/2023 1102 by Fracisco Guerrero RN  Outcome: Progressing     Problem: Pain  Goal: Verbalizes/displays adequate comfort level or baseline comfort level  11/18/2023 1945 by Johanna Lowe RN  Outcome: Progressing  11/18/2023 1102 by Fracisco Guerrero RN  Outcome: Progressing

## 2023-11-19 NOTE — PLAN OF CARE
Problem: Safety - Adult  Goal: Free from fall injury  11/19/2023 0947 by Ernesto Soto RN  Outcome: Progressing  11/18/2023 2313 by Jeanette Pardo RN  Outcome: Progressing     Problem: Pain  Goal: Verbalizes/displays adequate comfort level or baseline comfort level  11/19/2023 0947 by Ernesto Soto RN  Outcome: Progressing  11/18/2023 2313 by Jeanette Pardo RN  Outcome: Progressing

## 2023-11-20 VITALS
TEMPERATURE: 98.2 F | OXYGEN SATURATION: 98 % | HEART RATE: 60 BPM | DIASTOLIC BLOOD PRESSURE: 86 MMHG | HEIGHT: 71 IN | SYSTOLIC BLOOD PRESSURE: 120 MMHG | BODY MASS INDEX: 24.05 KG/M2 | RESPIRATION RATE: 16 BRPM | WEIGHT: 171.8 LBS

## 2023-11-20 LAB
ALBUMIN SERPL-MCNC: 4 G/DL (ref 3.5–5.2)
ALP SERPL-CCNC: 144 U/L (ref 40–129)
ALT SERPL-CCNC: 7 U/L (ref 0–40)
ANION GAP SERPL CALCULATED.3IONS-SCNC: 14 MMOL/L (ref 7–16)
AST SERPL-CCNC: 42 U/L (ref 0–39)
BASOPHILS # BLD: 0.02 K/UL (ref 0–0.2)
BASOPHILS NFR BLD: 0 % (ref 0–2)
BILIRUB SERPL-MCNC: 1 MG/DL (ref 0–1.2)
BUN SERPL-MCNC: 8 MG/DL (ref 6–23)
CALCIUM SERPL-MCNC: 8.8 MG/DL (ref 8.6–10.2)
CHLORIDE SERPL-SCNC: 105 MMOL/L (ref 98–107)
CO2 SERPL-SCNC: 22 MMOL/L (ref 22–29)
CREAT SERPL-MCNC: 0.6 MG/DL (ref 0.7–1.2)
EOSINOPHIL # BLD: 0.11 K/UL (ref 0.05–0.5)
EOSINOPHILS RELATIVE PERCENT: 2 % (ref 0–6)
ERYTHROCYTE [DISTWIDTH] IN BLOOD BY AUTOMATED COUNT: 16.7 % (ref 11.5–15)
GFR SERPL CREATININE-BSD FRML MDRD: >60 ML/MIN/1.73M2
GLUCOSE SERPL-MCNC: 79 MG/DL (ref 74–99)
HBV SURFACE AG SERPL QL IA: NONREACTIVE
HCT VFR BLD AUTO: 23.4 % (ref 37–54)
HCV AB SERPL QL IA: NONREACTIVE
HGB BLD-MCNC: 7.2 G/DL (ref 12.5–16.5)
IMM GRANULOCYTES # BLD AUTO: 0.03 K/UL (ref 0–0.58)
IMM GRANULOCYTES NFR BLD: 1 % (ref 0–5)
LYMPHOCYTES NFR BLD: 1.23 K/UL (ref 1.5–4)
LYMPHOCYTES RELATIVE PERCENT: 21 % (ref 20–42)
MCH RBC QN AUTO: 29 PG (ref 26–35)
MCHC RBC AUTO-ENTMCNC: 30.8 G/DL (ref 32–34.5)
MCV RBC AUTO: 94.4 FL (ref 80–99.9)
MONOCYTES NFR BLD: 0.6 K/UL (ref 0.1–0.95)
MONOCYTES NFR BLD: 10 % (ref 2–12)
NEUTROPHILS NFR BLD: 66 % (ref 43–80)
NEUTS SEG NFR BLD: 3.83 K/UL (ref 1.8–7.3)
NON-GYN CYTOLOGY REPORT: NORMAL
PLATELET # BLD AUTO: 158 K/UL (ref 130–450)
PMV BLD AUTO: 10.8 FL (ref 7–12)
POTASSIUM SERPL-SCNC: 3.8 MMOL/L (ref 3.5–5)
PROT SERPL-MCNC: 6.9 G/DL (ref 6.4–8.3)
RBC # BLD AUTO: 2.48 M/UL (ref 3.8–5.8)
SODIUM SERPL-SCNC: 141 MMOL/L (ref 132–146)
WBC OTHER # BLD: 5.8 K/UL (ref 4.5–11.5)

## 2023-11-20 PROCEDURE — P9047 ALBUMIN (HUMAN), 25%, 50ML: HCPCS | Performed by: STUDENT IN AN ORGANIZED HEALTH CARE EDUCATION/TRAINING PROGRAM

## 2023-11-20 PROCEDURE — 6370000000 HC RX 637 (ALT 250 FOR IP): Performed by: NURSE PRACTITIONER

## 2023-11-20 PROCEDURE — 2580000003 HC RX 258: Performed by: NURSE PRACTITIONER

## 2023-11-20 PROCEDURE — 80053 COMPREHEN METABOLIC PANEL: CPT

## 2023-11-20 PROCEDURE — 97161 PT EVAL LOW COMPLEX 20 MIN: CPT

## 2023-11-20 PROCEDURE — 36415 COLL VENOUS BLD VENIPUNCTURE: CPT

## 2023-11-20 PROCEDURE — 85025 COMPLETE CBC W/AUTO DIFF WBC: CPT

## 2023-11-20 PROCEDURE — 99232 SBSQ HOSP IP/OBS MODERATE 35: CPT | Performed by: NURSE PRACTITIONER

## 2023-11-20 PROCEDURE — 2500000003 HC RX 250 WO HCPCS: Performed by: STUDENT IN AN ORGANIZED HEALTH CARE EDUCATION/TRAINING PROGRAM

## 2023-11-20 PROCEDURE — 6360000002 HC RX W HCPCS: Performed by: STUDENT IN AN ORGANIZED HEALTH CARE EDUCATION/TRAINING PROGRAM

## 2023-11-20 PROCEDURE — 2580000003 HC RX 258: Performed by: STUDENT IN AN ORGANIZED HEALTH CARE EDUCATION/TRAINING PROGRAM

## 2023-11-20 PROCEDURE — 6360000002 HC RX W HCPCS: Performed by: NURSE PRACTITIONER

## 2023-11-20 RX ORDER — FUROSEMIDE 20 MG/1
20 TABLET ORAL DAILY
Qty: 60 TABLET | Refills: 3 | Status: SHIPPED | OUTPATIENT
Start: 2023-11-20

## 2023-11-20 RX ADMIN — METOPROLOL SUCCINATE 25 MG: 25 TABLET, EXTENDED RELEASE ORAL at 08:22

## 2023-11-20 RX ADMIN — WATER 1000 MG: 1 INJECTION INTRAMUSCULAR; INTRAVENOUS; SUBCUTANEOUS at 08:23

## 2023-11-20 RX ADMIN — THIAMINE HYDROCHLORIDE 100 MG: 100 INJECTION, SOLUTION INTRAMUSCULAR; INTRAVENOUS at 08:23

## 2023-11-20 RX ADMIN — ALBUMIN (HUMAN) 25 G: 0.25 INJECTION, SOLUTION INTRAVENOUS at 02:01

## 2023-11-20 RX ADMIN — ALBUMIN (HUMAN) 25 G: 0.25 INJECTION, SOLUTION INTRAVENOUS at 08:33

## 2023-11-20 RX ADMIN — SODIUM CHLORIDE, PRESERVATIVE FREE 10 ML: 5 INJECTION INTRAVENOUS at 08:22

## 2023-11-20 RX ADMIN — LOSARTAN POTASSIUM 100 MG: 50 TABLET, FILM COATED ORAL at 08:22

## 2023-11-20 RX ADMIN — MORPHINE SULFATE 1 MG: 2 INJECTION, SOLUTION INTRAMUSCULAR; INTRAVENOUS at 11:26

## 2023-11-20 RX ADMIN — TAMSULOSIN HYDROCHLORIDE 0.4 MG: 0.4 CAPSULE ORAL at 08:22

## 2023-11-20 RX ADMIN — FOLIC ACID 1 MG: 5 INJECTION, SOLUTION INTRAMUSCULAR; INTRAVENOUS; SUBCUTANEOUS at 08:22

## 2023-11-20 ASSESSMENT — PAIN SCALES - GENERAL: PAINLEVEL_OUTOF10: 7

## 2023-11-20 NOTE — CARE COORDINATION
Discharge order noted. Per urology note today, Urine cytology pending. Pt wants to go home. Will see in office in follow up. Plan is home with wife.    Kareem Silverio RN CM  [phone]
Internal Medicine On-call Care Coordination Note    I was called by the ED physician because they recommended admission for this patient and we cover their PCP. The history as I understand it after discussion with the ED physician is as follows:    Presents with hematuria  Hx prostate CA, treated 3 years ago   Urinary retention in ED- arboleda placed  Hgb actually improved since earlier this month  CT abd/pel ED concerned of possible mets to pelvis    I placed admission orders. Including:    Urology consulted in ED  Oncology consult    Dr. Julio Alavrenga, or our coverage will see the patient tomorrow for H&P.     Electronically signed by CARYN Gorman CNP on 11/16/2023 at 8:52 PM
Plan is home with wife when medically ready. Per internal med note today, Diagnostic and therapeutic paracentesis once directed by GI. US elastography showing hepatic steatosis. Liver serology ordered per GI. Cm/Sw following for any discharge needs that may arise.   Asaf Romero RN   [phone]
Yes    Potential assistance Purchasing Medications:    Meds-to-Beds request:        [facility],[address] [place], [address] [facility]  [address] [place] [address]  Phone: [phone] Fax: [phone]      Notes:    Factors facilitating achievement of predicted outcomes: Family support, Cooperative, and Pleasant    Barriers to discharge: Medical complications    Additional Case Management Notes: The Plan for Transition of Care is related to the following treatment goals of Hypokalemia [E87.6]  Urinary retention [R33.9]  Gross hematuria [R31.0]  Abnormal CT of the abdomen [R93.5]  Hematuria [V93.4]    IF APPLICABLE: The Patient and/or patient representative Nolberto Duncan and his family were provided with a choice of provider and agrees with the discharge plan. Freedom of choice list with basic dialogue that supports the patient's individualized plan of care/goals and shares the quality data associated with the providers was provided to: Patient   Patient Representative Name:       The Patient and/or Patient Representative Agree with the Discharge Plan?  Yes    Jalen Beasley RN  Case Management Department  Ph: [phone] Fax:

## 2023-11-20 NOTE — PLAN OF CARE
Problem: Safety - Adult  Goal: Free from fall injury  11/19/2023 2255 by Yandel Ybarra RN  Outcome: Progressing     Problem: Pain  Goal: Verbalizes/displays adequate comfort level or baseline comfort level  11/19/2023 2255 by Yandel Ybarra RN  Outcome: Progressing

## 2023-11-20 NOTE — PLAN OF CARE
Problem: Safety - Adult  Goal: Free from fall injury  11/20/2023 0955 by Lorraine Sellers RN  Outcome: Progressing     Problem: Pain  Goal: Verbalizes/displays adequate comfort level or baseline comfort level  11/20/2023 0955 by Lorraine Sellers RN  Outcome: Progressing

## 2023-11-21 LAB
CERULOPLASMIN SERPL-MCNC: 29 MG/DL (ref 15–30)
MITOCHONDRIA AB SER QL: NEGATIVE

## 2023-11-25 PROBLEM — I25.10 CORONARY ARTERY DISEASE INVOLVING NATIVE CORONARY ARTERY OF NATIVE HEART WITHOUT ANGINA PECTORIS: Status: ACTIVE | Noted: 2023-11-25

## 2023-11-25 PROBLEM — R00.0 SINUS TACHYCARDIA: Status: ACTIVE | Noted: 2023-11-25

## 2023-11-25 PROBLEM — Z72.0 TOBACCO ABUSE: Status: ACTIVE | Noted: 2023-11-25

## 2023-12-18 ENCOUNTER — APPOINTMENT (OUTPATIENT)
Dept: CT IMAGING | Age: 68
DRG: 664 | End: 2023-12-18
Payer: MEDICARE

## 2023-12-18 ENCOUNTER — HOSPITAL ENCOUNTER (INPATIENT)
Age: 68
LOS: 4 days | Discharge: HOME | DRG: 664 | End: 2023-12-22
Attending: EMERGENCY MEDICINE | Admitting: STUDENT IN AN ORGANIZED HEALTH CARE EDUCATION/TRAINING PROGRAM
Payer: MEDICARE

## 2023-12-18 DIAGNOSIS — E83.42 HYPOMAGNESEMIA: ICD-10-CM

## 2023-12-18 DIAGNOSIS — N30.40 CHRONIC RADIATION CYSTITIS: Primary | ICD-10-CM

## 2023-12-18 DIAGNOSIS — E87.6 HYPOKALEMIA: ICD-10-CM

## 2023-12-18 DIAGNOSIS — R31.9 HEMATURIA, UNSPECIFIED TYPE: ICD-10-CM

## 2023-12-18 DIAGNOSIS — R31.0 GROSS HEMATURIA: ICD-10-CM

## 2023-12-18 LAB
ALBUMIN SERPL-MCNC: 3.8 G/DL (ref 3.5–5.2)
ALP SERPL-CCNC: 206 U/L (ref 40–129)
ALT SERPL-CCNC: 14 U/L (ref 0–40)
ANION GAP SERPL CALCULATED.3IONS-SCNC: 22 MMOL/L (ref 7–16)
AST SERPL-CCNC: 66 U/L (ref 0–39)
BACTERIA URNS QL MICRO: ABNORMAL
BASOPHILS # BLD: 0.02 K/UL (ref 0–0.2)
BASOPHILS NFR BLD: 0 % (ref 0–2)
BILIRUB SERPL-MCNC: 0.6 MG/DL (ref 0–1.2)
BILIRUB UR QL STRIP: NEGATIVE
BUN SERPL-MCNC: 12 MG/DL (ref 6–23)
CALCIUM SERPL-MCNC: 8.5 MG/DL (ref 8.6–10.2)
CHLORIDE SERPL-SCNC: 100 MMOL/L (ref 98–107)
CLARITY UR: ABNORMAL
CO2 SERPL-SCNC: 18 MMOL/L (ref 22–29)
COLOR UR: ABNORMAL
CREAT SERPL-MCNC: 0.8 MG/DL (ref 0.7–1.2)
EOSINOPHIL # BLD: 0.02 K/UL (ref 0.05–0.5)
EOSINOPHILS RELATIVE PERCENT: 0 % (ref 0–6)
ERYTHROCYTE [DISTWIDTH] IN BLOOD BY AUTOMATED COUNT: 14.8 % (ref 11.5–15)
GFR SERPL CREATININE-BSD FRML MDRD: >60 ML/MIN/1.73M2
GLUCOSE SERPL-MCNC: 105 MG/DL (ref 74–99)
GLUCOSE UR STRIP-MCNC: 100 MG/DL
HCT VFR BLD AUTO: 30.5 % (ref 37–54)
HGB BLD-MCNC: 9.7 G/DL (ref 12.5–16.5)
HGB UR QL STRIP.AUTO: ABNORMAL
IMM GRANULOCYTES # BLD AUTO: <0.03 K/UL (ref 0–0.58)
IMM GRANULOCYTES NFR BLD: 0 % (ref 0–5)
INR PPP: 1.1
KETONES UR STRIP-MCNC: NEGATIVE MG/DL
LEUKOCYTE ESTERASE UR QL STRIP: ABNORMAL
LYMPHOCYTES NFR BLD: 1.47 K/UL (ref 1.5–4)
LYMPHOCYTES RELATIVE PERCENT: 31 % (ref 20–42)
MAGNESIUM SERPL-MCNC: 1.3 MG/DL (ref 1.6–2.6)
MCH RBC QN AUTO: 29 PG (ref 26–35)
MCHC RBC AUTO-ENTMCNC: 31.8 G/DL (ref 32–34.5)
MCV RBC AUTO: 91 FL (ref 80–99.9)
MONOCYTES NFR BLD: 0.39 K/UL (ref 0.1–0.95)
MONOCYTES NFR BLD: 8 % (ref 2–12)
NEUTROPHILS NFR BLD: 60 % (ref 43–80)
NEUTS SEG NFR BLD: 2.85 K/UL (ref 1.8–7.3)
NITRITE UR QL STRIP: POSITIVE
PARTIAL THROMBOPLASTIN TIME: 35 SEC (ref 24.5–35.1)
PH UR STRIP: 7.5 [PH] (ref 5–9)
PLATELET # BLD AUTO: 111 K/UL (ref 130–450)
PMV BLD AUTO: 10.8 FL (ref 7–12)
POTASSIUM SERPL-SCNC: 3 MMOL/L (ref 3.5–5)
PROT SERPL-MCNC: 7.6 G/DL (ref 6.4–8.3)
PROT UR STRIP-MCNC: >=300 MG/DL
PROTHROMBIN TIME: 12.3 SEC (ref 9.3–12.4)
RBC # BLD AUTO: 3.35 M/UL (ref 3.8–5.8)
RBC #/AREA URNS HPF: ABNORMAL /HPF
SODIUM SERPL-SCNC: 140 MMOL/L (ref 132–146)
SP GR UR STRIP: 1.02 (ref 1–1.03)
UROBILINOGEN UR STRIP-ACNC: 1 EU/DL (ref 0–1)
WBC #/AREA URNS HPF: ABNORMAL /HPF
WBC OTHER # BLD: 4.8 K/UL (ref 4.5–11.5)

## 2023-12-18 PROCEDURE — 6360000002 HC RX W HCPCS: Performed by: NURSE PRACTITIONER

## 2023-12-18 PROCEDURE — 2580000003 HC RX 258: Performed by: STUDENT IN AN ORGANIZED HEALTH CARE EDUCATION/TRAINING PROGRAM

## 2023-12-18 PROCEDURE — 6370000000 HC RX 637 (ALT 250 FOR IP): Performed by: STUDENT IN AN ORGANIZED HEALTH CARE EDUCATION/TRAINING PROGRAM

## 2023-12-18 PROCEDURE — 1200000000 HC SEMI PRIVATE

## 2023-12-18 PROCEDURE — 6370000000 HC RX 637 (ALT 250 FOR IP): Performed by: NURSE PRACTITIONER

## 2023-12-18 PROCEDURE — 51798 US URINE CAPACITY MEASURE: CPT

## 2023-12-18 PROCEDURE — 99285 EMERGENCY DEPT VISIT HI MDM: CPT

## 2023-12-18 PROCEDURE — 85730 THROMBOPLASTIN TIME PARTIAL: CPT

## 2023-12-18 PROCEDURE — 85025 COMPLETE CBC W/AUTO DIFF WBC: CPT

## 2023-12-18 PROCEDURE — 6360000002 HC RX W HCPCS: Performed by: STUDENT IN AN ORGANIZED HEALTH CARE EDUCATION/TRAINING PROGRAM

## 2023-12-18 PROCEDURE — 85610 PROTHROMBIN TIME: CPT

## 2023-12-18 PROCEDURE — 88112 CYTOPATH CELL ENHANCE TECH: CPT

## 2023-12-18 PROCEDURE — 74176 CT ABD & PELVIS W/O CONTRAST: CPT

## 2023-12-18 PROCEDURE — 87086 URINE CULTURE/COLONY COUNT: CPT

## 2023-12-18 PROCEDURE — 83735 ASSAY OF MAGNESIUM: CPT

## 2023-12-18 PROCEDURE — 81001 URINALYSIS AUTO W/SCOPE: CPT

## 2023-12-18 PROCEDURE — 80053 COMPREHEN METABOLIC PANEL: CPT

## 2023-12-18 PROCEDURE — 2580000003 HC RX 258: Performed by: NURSE PRACTITIONER

## 2023-12-18 RX ORDER — SODIUM CHLORIDE 9 MG/ML
INJECTION, SOLUTION INTRAVENOUS PRN
Status: DISCONTINUED | OUTPATIENT
Start: 2023-12-18 | End: 2023-12-22 | Stop reason: HOSPADM

## 2023-12-18 RX ORDER — ONDANSETRON 2 MG/ML
4 INJECTION INTRAMUSCULAR; INTRAVENOUS EVERY 6 HOURS PRN
Status: DISCONTINUED | OUTPATIENT
Start: 2023-12-18 | End: 2023-12-22 | Stop reason: HOSPADM

## 2023-12-18 RX ORDER — POTASSIUM CHLORIDE 7.45 MG/ML
10 INJECTION INTRAVENOUS PRN
Status: DISCONTINUED | OUTPATIENT
Start: 2023-12-18 | End: 2023-12-22 | Stop reason: HOSPADM

## 2023-12-18 RX ORDER — GABAPENTIN 100 MG/1
100 CAPSULE ORAL EVERY 12 HOURS PRN
Status: DISCONTINUED | OUTPATIENT
Start: 2023-12-18 | End: 2023-12-22 | Stop reason: HOSPADM

## 2023-12-18 RX ORDER — ACETAMINOPHEN 650 MG/1
650 SUPPOSITORY RECTAL EVERY 6 HOURS PRN
Status: DISCONTINUED | OUTPATIENT
Start: 2023-12-18 | End: 2023-12-22 | Stop reason: HOSPADM

## 2023-12-18 RX ORDER — SENNOSIDES A AND B 8.6 MG/1
1 TABLET, FILM COATED ORAL DAILY PRN
Status: DISCONTINUED | OUTPATIENT
Start: 2023-12-18 | End: 2023-12-22 | Stop reason: HOSPADM

## 2023-12-18 RX ORDER — SODIUM CHLORIDE, SODIUM LACTATE, POTASSIUM CHLORIDE, CALCIUM CHLORIDE 600; 310; 30; 20 MG/100ML; MG/100ML; MG/100ML; MG/100ML
INJECTION, SOLUTION INTRAVENOUS CONTINUOUS
Status: DISCONTINUED | OUTPATIENT
Start: 2023-12-18 | End: 2023-12-21 | Stop reason: SDUPTHER

## 2023-12-18 RX ORDER — SODIUM CHLORIDE 9 MG/ML
INJECTION, SOLUTION INTRAVENOUS CONTINUOUS
Status: DISCONTINUED | OUTPATIENT
Start: 2023-12-18 | End: 2023-12-18

## 2023-12-18 RX ORDER — POTASSIUM CHLORIDE 20 MEQ/1
40 TABLET, EXTENDED RELEASE ORAL PRN
Status: DISCONTINUED | OUTPATIENT
Start: 2023-12-18 | End: 2023-12-22 | Stop reason: HOSPADM

## 2023-12-18 RX ORDER — FUROSEMIDE 20 MG/1
20 TABLET ORAL DAILY
Status: DISCONTINUED | OUTPATIENT
Start: 2023-12-18 | End: 2023-12-22 | Stop reason: HOSPADM

## 2023-12-18 RX ORDER — LORAZEPAM 1 MG/1
4 TABLET ORAL
Status: DISCONTINUED | OUTPATIENT
Start: 2023-12-18 | End: 2023-12-18

## 2023-12-18 RX ORDER — LORAZEPAM 2 MG/ML
1 INJECTION INTRAMUSCULAR
Status: DISCONTINUED | OUTPATIENT
Start: 2023-12-18 | End: 2023-12-22 | Stop reason: HOSPADM

## 2023-12-18 RX ORDER — LORAZEPAM 1 MG/1
2 TABLET ORAL
Status: DISCONTINUED | OUTPATIENT
Start: 2023-12-18 | End: 2023-12-18

## 2023-12-18 RX ORDER — LANOLIN ALCOHOL/MO/W.PET/CERES
100 CREAM (GRAM) TOPICAL DAILY
Status: DISCONTINUED | OUTPATIENT
Start: 2023-12-18 | End: 2023-12-22 | Stop reason: HOSPADM

## 2023-12-18 RX ORDER — SODIUM CHLORIDE 0.9 % (FLUSH) 0.9 %
5-40 SYRINGE (ML) INJECTION EVERY 12 HOURS SCHEDULED
Status: DISCONTINUED | OUTPATIENT
Start: 2023-12-18 | End: 2023-12-22 | Stop reason: HOSPADM

## 2023-12-18 RX ORDER — FOLIC ACID 1 MG/1
1 TABLET ORAL DAILY
Status: DISCONTINUED | OUTPATIENT
Start: 2023-12-18 | End: 2023-12-22 | Stop reason: HOSPADM

## 2023-12-18 RX ORDER — LORAZEPAM 1 MG/1
1 TABLET ORAL
Status: DISCONTINUED | OUTPATIENT
Start: 2023-12-18 | End: 2023-12-22 | Stop reason: HOSPADM

## 2023-12-18 RX ORDER — MAGNESIUM SULFATE IN WATER 40 MG/ML
2000 INJECTION, SOLUTION INTRAVENOUS PRN
Status: DISCONTINUED | OUTPATIENT
Start: 2023-12-18 | End: 2023-12-22 | Stop reason: HOSPADM

## 2023-12-18 RX ORDER — LOSARTAN POTASSIUM 50 MG/1
100 TABLET ORAL DAILY
Status: DISCONTINUED | OUTPATIENT
Start: 2023-12-18 | End: 2023-12-22 | Stop reason: HOSPADM

## 2023-12-18 RX ORDER — ONDANSETRON 4 MG/1
4 TABLET, ORALLY DISINTEGRATING ORAL EVERY 8 HOURS PRN
Status: DISCONTINUED | OUTPATIENT
Start: 2023-12-18 | End: 2023-12-22 | Stop reason: HOSPADM

## 2023-12-18 RX ORDER — METOPROLOL SUCCINATE 25 MG/1
25 TABLET, EXTENDED RELEASE ORAL DAILY
Status: DISCONTINUED | OUTPATIENT
Start: 2023-12-18 | End: 2023-12-22 | Stop reason: HOSPADM

## 2023-12-18 RX ORDER — LORAZEPAM 2 MG/ML
2 INJECTION INTRAMUSCULAR
Status: DISCONTINUED | OUTPATIENT
Start: 2023-12-18 | End: 2023-12-18

## 2023-12-18 RX ORDER — SODIUM CHLORIDE 0.9 % (FLUSH) 0.9 %
5-40 SYRINGE (ML) INJECTION PRN
Status: DISCONTINUED | OUTPATIENT
Start: 2023-12-18 | End: 2023-12-22 | Stop reason: HOSPADM

## 2023-12-18 RX ORDER — LORAZEPAM 1 MG/1
3 TABLET ORAL
Status: DISCONTINUED | OUTPATIENT
Start: 2023-12-18 | End: 2023-12-18

## 2023-12-18 RX ORDER — LORAZEPAM 2 MG/ML
3 INJECTION INTRAMUSCULAR
Status: DISCONTINUED | OUTPATIENT
Start: 2023-12-18 | End: 2023-12-18

## 2023-12-18 RX ORDER — SODIUM CHLORIDE 0.9 % (FLUSH) 0.9 %
10 SYRINGE (ML) INJECTION PRN
Status: DISCONTINUED | OUTPATIENT
Start: 2023-12-18 | End: 2023-12-22 | Stop reason: HOSPADM

## 2023-12-18 RX ORDER — LORAZEPAM 2 MG/ML
4 INJECTION INTRAMUSCULAR
Status: DISCONTINUED | OUTPATIENT
Start: 2023-12-18 | End: 2023-12-18

## 2023-12-18 RX ORDER — LANOLIN ALCOHOL/MO/W.PET/CERES
400 CREAM (GRAM) TOPICAL 2 TIMES DAILY
Status: DISPENSED | OUTPATIENT
Start: 2023-12-18 | End: 2023-12-19

## 2023-12-18 RX ORDER — POTASSIUM CHLORIDE 20 MEQ/1
40 TABLET, EXTENDED RELEASE ORAL ONCE
Status: COMPLETED | OUTPATIENT
Start: 2023-12-18 | End: 2023-12-18

## 2023-12-18 RX ORDER — MAGNESIUM SULFATE IN WATER 40 MG/ML
2000 INJECTION, SOLUTION INTRAVENOUS ONCE
Status: COMPLETED | OUTPATIENT
Start: 2023-12-18 | End: 2023-12-18

## 2023-12-18 RX ORDER — ACETAMINOPHEN 325 MG/1
650 TABLET ORAL EVERY 6 HOURS PRN
Status: DISCONTINUED | OUTPATIENT
Start: 2023-12-18 | End: 2023-12-22 | Stop reason: HOSPADM

## 2023-12-18 RX ORDER — SODIUM CHLORIDE 0.9 % (FLUSH) 0.9 %
10 SYRINGE (ML) INJECTION EVERY 12 HOURS SCHEDULED
Status: DISCONTINUED | OUTPATIENT
Start: 2023-12-18 | End: 2023-12-22 | Stop reason: HOSPADM

## 2023-12-18 RX ORDER — TAMSULOSIN HYDROCHLORIDE 0.4 MG/1
0.4 CAPSULE ORAL DAILY
Status: DISCONTINUED | OUTPATIENT
Start: 2023-12-18 | End: 2023-12-22 | Stop reason: HOSPADM

## 2023-12-18 RX ADMIN — Medication 400 MG: at 20:13

## 2023-12-18 RX ADMIN — TAMSULOSIN HYDROCHLORIDE 0.4 MG: 0.4 CAPSULE ORAL at 09:53

## 2023-12-18 RX ADMIN — POTASSIUM CHLORIDE 40 MEQ: 1500 TABLET, EXTENDED RELEASE ORAL at 09:53

## 2023-12-18 RX ADMIN — WATER 1000 MG: 1 INJECTION INTRAMUSCULAR; INTRAVENOUS; SUBCUTANEOUS at 09:54

## 2023-12-18 RX ADMIN — METOPROLOL SUCCINATE 25 MG: 25 TABLET, EXTENDED RELEASE ORAL at 09:53

## 2023-12-18 RX ADMIN — MAGNESIUM SULFATE HEPTAHYDRATE 2000 MG: 40 INJECTION, SOLUTION INTRAVENOUS at 10:05

## 2023-12-18 RX ADMIN — FOLIC ACID 1 MG: 1 TABLET ORAL at 17:10

## 2023-12-18 RX ADMIN — SODIUM CHLORIDE, POTASSIUM CHLORIDE, SODIUM LACTATE AND CALCIUM CHLORIDE: 600; 310; 30; 20 INJECTION, SOLUTION INTRAVENOUS at 10:02

## 2023-12-18 RX ADMIN — Medication 100 MG: at 17:10

## 2023-12-18 ASSESSMENT — PAIN - FUNCTIONAL ASSESSMENT
PAIN_FUNCTIONAL_ASSESSMENT: NONE - DENIES PAIN
PAIN_FUNCTIONAL_ASSESSMENT: NONE - DENIES PAIN

## 2023-12-18 ASSESSMENT — LIFESTYLE VARIABLES
HOW MANY STANDARD DRINKS CONTAINING ALCOHOL DO YOU HAVE ON A TYPICAL DAY: 5 OR 6
HOW OFTEN DO YOU HAVE A DRINK CONTAINING ALCOHOL: 2-4 TIMES A MONTH

## 2023-12-18 ASSESSMENT — PAIN SCALES - GENERAL
PAINLEVEL_OUTOF10: 0

## 2023-12-18 NOTE — ED TRIAGE NOTES
Department of Emergency Medicine  FIRST PROVIDER TRIAGE NOTE             Independent MLP           12/18/23  1:12 AM EST    Date of Encounter: 12/18/23   MRN: [id number]      HPI: Casper Orosco is a 80 y.o. male who presents to the ED for Hematuria (Hematuria starting this morning. Patient admitted for this around Thanksgiving. Patient's wife states there are large amounts of clots in the urine.)       ROS: Negative for cp or sob. PE: Gen Appearance/Constitutional: alert  HEENT: NC/NT. PERRLA,  Airway patent. Initial Plan of Care: All treatment areas with department are currently occupied. Plan to order/Initiate the following while awaiting opening in ED: labs and imaging studies.   Initiate Treatment-Testing, Proceed toTreatment Area When Bed Available for ED Attending/MLP to Continue Care    Electronically signed by CARYN Coughlin CNP   DD: 12/18/23
ED Record Reviewed

## 2023-12-18 NOTE — ED PROVIDER NOTES
[address] [facility]      Pt Name: Jos Olson  MRN: [id number]  [address] [facility] [place] 1955  Date of evaluation: 12/18/2023  Provider: Jesus Reyes DO  PCP: Zeynep Spear MD  Note Started: 1:29 AM EST 12/18/23    CHIEF COMPLAINT       Chief Complaint   Patient presents with    Hematuria     Hematuria starting this morning. [facility] Patient's wife states there are large amounts of clots in the urine. HISTORY OF PRESENT ILLNESS: 1 or more Elements   History From: Patient  Limitations to history : None    Jos Olson is a 80 y.o. male who presents to the ED due to hematuria. Patient states that he started peeing blood this morning states that he is passing large clots. He notes that he does not feel he is completely emptying his bladder. Recently seen for similar complaint and a three-way Anaya catheter was placed with bladder irrigation at that time. Denies any significant pain with urination. Denies any fever or chills. Endorses a sensation of abdominal fullness and distention. Denies any nausea or vomiting. Nursing Notes were all reviewed and agreed with or any disagreements were addressed in the HPI. REVIEW OF SYSTEMS :    Positives and Pertinent negatives as per HPI. PAST MEDICAL HISTORY/Chronic Conditions Affecting Care    has no past medical history on file. SURGICAL HISTORY   History reviewed. No pertinent surgical history.      [facility]       Current Discharge Medication List        CONTINUE these medications which have NOT CHANGED    Details   furosemide (LASIX) 20 MG tablet Take 1 tablet by mouth daily  Qty: 60 tablet, Refills: 3      metoprolol succinate (TOPROL XL) 25 MG extended release tablet Take 1 tablet by mouth daily  Qty: 30 tablet, Refills: 3      folic acid (FOLVITE) 1 MG tablet Take 1 tablet by mouth daily      vitamin B-1 (THIAMINE) 100 MG tablet Take 1 tablet by mouth daily      losartan (COZAAR) for further workup and treatment of his hematuria by Dr. Anselmo Loco at this time. Disposition Considerations (Tests not ordered but considered, Shared Decision Making, Pt Expectation of Test or Tx.):     FINAL IMPRESSION      1. Hematuria, unspecified type    2. Hypomagnesemia    3. Hypokalemia          DISPOSITION/PLAN     DISPOSITION Admitted 12/18/2023 05:22:25 AM    PATIENT REFERRED TO:  No follow-up provider specified.     DISCHARGE MEDICATIONS:  Current Discharge Medication List          DISCONTINUED MEDICATIONS:  Current Discharge Medication List               (Please note that portions of this note were completed with a voice recognition program.  Efforts were made to edit the dictations but occasionally words are mis-transcribed.)    Mercedez Barnard DO (electronically signed)       Mercedez Barnard DO  Resident  12/18/23 9012

## 2023-12-18 NOTE — CONSULTS
[address]                                  CONSULTATION    PATIENT NAME: Gill Chan                       :        1955  MED REC NO:   [id number]                            ROOM:       29  ACCOUNT NO:   [de-identified]                           ADMIT DATE: 2023  PROVIDER:     Nela Emery MD    CONSULT DATE:  2023    CHIEF COMPLAINT:  Hematuria with clots. HISTORY OF PRESENT ILLNESS:  This 20-year-old male who was first seen by  our office about a month ago when he presented to the emergency room  with gross hematuria. A three-way Anaya was put in place. The  patient's urine cleared. He was seen subsequently in the office and was  scheduled for cystoscopy. However, on 2023, he began having  hematuria with some clots, came back to the emergency room. Currently,  he is voiding fairly comfortable and is very much against having had a  Anaya catheter put in place again. The patient apparently in 2018 was seen in [place], [place], treated for  prostate cancer with radiation. He also had a penile prosthesis  inserted in [facility]. PAST MEDICAL HISTORY:  He has had hypertension. PAST SURGICAL HISTORY:  As mentioned above. MEDICATIONS:  Include Flomax, Neurontin, Cozaar, thiamine, and Folvite. ALLERGIES:  None known to medication. SOCIAL HISTORY:  The patient continues to be a cigarette smoker. He  also has alcohol use at this time. FAMILY HISTORY:  Noncontributory. REVIEW OF SYSTEMS:  RESPIRATORY:  No coughing or hemoptysis. CARDIOVASCULAR:  Denies any chest pain. PHYSICAL EXAMINATION:  GENERAL:  The patient is alert, oriented male who does not appear to be  in severe distress. HEAD, EYES, EARS, NOSE, AND THROAT:  Unremarkable. ABDOMEN:  Soft. GENITOURINARY:  Bladder is not palpable. Penis, the patient has  inflatable prosthesis in place.   Testes are normal.    IMPRESSION AND PLAN:  History of prostate cancer, status post radiation  treatment. The patient does have a history of cigarette abuse and he  has now had a second episode of hematuria. We will try to manage him  without a Anaya catheter and will schedule for cystoscopy.         Melva Theodore MD    D: 12/18/2023 9:11:01       T: 12/18/2023 9:13:24     [id number]  Job#: [id number]     Doc#: [id number]    CC:

## 2023-12-18 NOTE — PROGRESS NOTES
Patient reports that he drinks 4 bottles of vodka a day, has progressively gotten worst over the last year. States he wants to stop. Dr. Balta Escobedo Notified and [facility] protocol ordered.

## 2023-12-18 NOTE — H&P
Internal Medicine History & Physical     Name: Meng Leiva  : 1955  Chief Complaint: Hematuria (Hematuria starting this morning. Patient admitted for this around Thanksgiving. Patient's wife states there are large amounts of clots in the urine.)  Primary Care Physician: Vinod Smith MD  Admission date: 2023  Date of service: 2023     History of Present Illness  Filomena Richards is a 80y.o. year old male who presented with a chief complaint of hematuria     Very nice gentleman presented for the evaluation of hematuria which started yesterday he tells me his wife urged him to come to the hospital for evaluation. He has been admitted by me before and has a history of melena which has been evaluated by GI, hx of alcohol abuse, history of prostate cancer but more recently PSA checked and less than 0.01, history of abdominal ascites and recently seen by GI found to have hepatic steatosis. Recently admitted for hematuria with a negative urine culture treated with Rocephin seen by urology. History reviewed. No pertinent past medical history. History reviewed. No pertinent surgical history. History reviewed. No pertinent family history. Social History  Patient lives at home . At baseline patient ambulates with out assistance   Illicit drugs: Denies   TOBACCO:   reports that he has been smoking cigarettes. He started smoking about 53 years ago. He has a 26.6 pack-year smoking history. He has never used smokeless tobacco.  ETOH:   reports current alcohol use. Home Medications  Prior to Admission medications    Medication Sig Start Date End Date Taking?  Authorizing Provider   furosemide (LASIX) 20 MG tablet Take 1 tablet by mouth daily 23   Yonis Brito MD   metoprolol succinate (TOPROL XL) 25 MG extended release tablet Take 1 tablet by mouth daily 23   Yonis Brito MD   folic acid (FOLVITE) 1 MG tablet Take 1 tablet by mouth daily    Provider, MD Funmi   vitamin B-1 moist, no obvious oral sores  Head: normocephalic, atraumatic  Neck: no JVD, no adenopathy, no thyromegaly, neck is supple, trachea is midline  Back: ROM normal, no CVA tenderness. Chest: no pain on palpation  Lungs: clear to auscultation bilaterally, without rhonchi, crackle, wheezing, or rale, no retractions or use of accessory muscles  Heart: regular rate and regular rhythm, no murmur, normal S1, S2  Abdomen: soft, non-tender; bowel sounds normal; no masses, no organomegaly  : Deferred   Extremities: no lower extremity edema, extremities atraumatic, no cyanosis, no clubbing, 2+ pedal pulses palpated  Skin: normal color, normal texture, normal turgor, no rashes, no lesions  Neurologic:5/5 muscle strength throughout, normal muscle tone throughout, face symmetric, hearing intact, tongue midline, speech appropriate without slurring, sensation to fine touch intact in upper and lower extremities    Labs-   Lab Results   Component Value Date    WBC 4.8 12/18/2023    HGB 9.7 (L) 12/18/2023    HCT 30.5 (L) 12/18/2023     (L) 12/18/2023     12/18/2023    K 3.0 (L) 12/18/2023     12/18/2023    CREATININE 0.8 12/18/2023    BUN 12 12/18/2023    CO2 18 (L) 12/18/2023    GLUCOSE 105 (H) 12/18/2023    ALT 14 12/18/2023    AST 66 (H) 12/18/2023    INR 1.1 12/18/2023    APTT 35.0 12/18/2023     No results found for: \"CKTOTAL\", \"CKMB\", \"CKMBINDEX\", \"TROPONINI\"    Last echocardiogram:      Recent Radiological Studies:  CT ABDOMEN PELVIS WO CONTRAST Additional Contrast? None   Final Result   Layering hyperattenuating material within the bladder, possibly blood   products. No radiopaque renal stone. No hydronephrosis.              Assessment  Active Hospital Problems    Diagnosis     Coronary artery disease involving native coronary artery of native heart without angina pectoris [I25.10]     Sinus tachycardia [R00.0]     Tobacco abuse [Z72.0]     Hematuria [R31.9]     History of prostate cancer [Z85.46] Alcohol abuse [F10.10]     Paroxysmal atrial fibrillation (HCC) [I48.0]        Patient Active Problem List    Diagnosis Date Noted    Sinus tachycardia 11/25/2023    Coronary artery disease involving native coronary artery of native heart without angina pectoris 11/25/2023    Tobacco abuse 11/25/2023    Other fatigue 11/16/2023    Acute anemia 11/16/2023    Other ascites 11/16/2023    Alcohol abuse 11/16/2023    Hematuria 11/16/2023    History of prostate cancer 11/16/2023    Paroxysmal atrial fibrillation (HCC) 11/02/2023       Plan  Hematuria with cystitis   CT showing blood in bladder  Urology consult   Rocephin   UA +  Follow urine culture   PT/OT  Home medications to be reconciled and confirmed prior to being ordered  DVT prophylaxis   Code Status Full   Discharge plan: TBD pending clinical improvement     Trever Anderson MD  Internal medicine   12/18/2023  12:40 PM    I can be reached through [employer].    NOTE:  This report was transcribed using voice recognition software.  Every effort was made to ensure accuracy; however, inadvertent computerized transcription errors may be present.

## 2023-12-18 NOTE — PROGRESS NOTES
Spoke to Rosmery Monroe NP and no urology intervention today. Requested diet order from Dr Loan Paul.

## 2023-12-18 NOTE — CARE COORDINATION
12/18/23 Transition of care: patient is a readmit due to same c/o hematuria. He has a past medical history of Ca prostate. He is alert and oriented. He lives with his wife in a third floor apartment with an elevator. He is independent. He does drive. He follows with Dr Mary Henson and his pharmacy is [facility] on [address]. The plan for the patient is a possible repeat cysto per urology. He will return home post hospital stay. Electronically signed by Ny Klein RN CM on 12/18/2023 at 1:43 PM      Case Management Assessment  Initial Evaluation    Date/Time of Evaluation: 12/18/2023 2:06 PM  Assessment Completed by: Ny Klein RN    If patient is discharged prior to next notation, then this note serves as note for discharge by case management. Patient Name: Toño Pickens                   YOB: 1955  Diagnosis: Hypokalemia [E87.6]  Hypomagnesemia [E83.42]  Hematuria [R31.9]  Hematuria, unspecified type [R31.9]                   Date / Time: 12/18/2023  1:10 AM    Patient Admission Status: Inpatient   Readmission Risk (Low < 19, Mod (19-27), High > 27): Readmission Risk Score: 15.4    Current PCP: Jamel Charlton MD  PCP verified by ? Yes    Chart Reviewed: Yes      History Provided by: Patient  Patient Orientation: Alert and Oriented    Patient Cognition: Alert    Hospitalization in the last 30 days (Readmission):  Yes    If yes, Readmission Assessment in  Navigator will be completed.     Advance Directives:      Code Status: Full Code   Patient's Primary Decision Maker is: Legal Next of Kin    Primary Decision MakeKati Zee Spouse - [phone]    Discharge Planning:    Patient lives with: Spouse/Significant Other Type of Home: Apartment  Primary Care Giver: Self  Patient Support Systems include: Spouse/Significant Other   Current Financial resources:    Current community resources:    Current services prior to admission: None            Current DME:              Type of Home Care services:  None    ADLS  Prior functional level: Independent in ADLs/IADLs  Current functional level: Independent in ADLs/IADLs    PT AM-PAC:   /24  OT AM-PAC:   /24    Family can provide assistance at DC: Yes  Would you like Case Management to discuss the discharge plan with any other family members/significant others, and if so, who? No  Plans to Return to Present Housing: Yes  Other Identified Issues/Barriers to RETURNING to current housing: apartments  Potential Assistance needed at discharge: N/A            Potential DME:    [address] expects to [address] for transportation at discharge:      Financial    Payor: [address] [place] [address] / Plan: Sunny Mortimer / Product Type: *No Product type* /     Does insurance require precert for SNF: Yes    Potential assistance Purchasing Medications:    Meds-to-Beds request:        [address] [place] [address],[facility], [address] [place] [address].   [address] [place] [address] Drive  Phone: [phone] Fax: [phone]      Notes:    Factors facilitating achievement of predicted outcomes: Family support    Barriers to discharge: Pain    Additional Case Management Notes: see notes     The Plan for Transition of Care is related to the following treatment goals of Hypokalemia [E87.6]  Hypomagnesemia [E83.42]  Hematuria [R31.9]  Hematuria, unspecified type [R31.9]         Jesse Loera RN  Case Management Department  Ph: [phone] Fax:

## 2023-12-18 NOTE — PROGRESS NOTES
4 Eyes Skin Assessment     NAME:  Haily Villegas  YOB: 1955  MEDICAL RECORD NUMBER:  [id number]    The patient is being assessed for  Admission    I agree that at least one RN has performed a thorough Head to Toe Skin Assessment on the patient. ALL assessment sites listed below have been assessed. Areas assessed by both nurses:    Head, Face, Ears, Shoulders, Back, Chest, Arms, Elbows, Hands, Sacrum. Buttock, Coccyx, Ischium, Legs. Feet and Heels, and Under Medical Devices         Does the Patient have a Wound?  No noted wound(s)       Robert Prevention initiated by RN: No  Wound Care Orders initiated by RN: No    Pressure Injury (Stage 3,4, Unstageable, DTI, NWPT, and Complex wounds) if present, place Wound referral order by RN under : No    New Ostomies, if present place, Ostomy referral order under : No     Nurse 1 eSignature: Electronically signed by Kaylynn Starks RN on 12/18/23 at 2:29 PM EST    **SHARE this note so that the co-signing nurse can place an eSignature**    Nurse 2 eSignature: Electronically signed by Tisha Michael RN on 12/18/23 at 5:33 PM EST

## 2023-12-18 NOTE — CARE COORDINATION
Internal Medicine On-call Care Coordination Note    I was called by the ED physician because they recommended admission for this patient and we cover their PCP. The history as I understand it after discussion with the ED physician is as follows:    Presents with hematuria  Recent hospitalization for same, seen by urology, if persists needs cysto  3 way catheter inserted and CBI started  UA positive  Given IV rocephin  Lactic 3.7    I placed admission orders. Including:    IV rocephin  Urology consult  Held anticoagulants  NPO pending plan  IVF    Dr. Lio Almeida, or our coverage will see the patient  for H&P.     Electronically signed by CARYN Gomez CNP on 12/18/2023 at 6:37 AM

## 2023-12-19 LAB
ALBUMIN SERPL-MCNC: 3.4 G/DL (ref 3.5–5.2)
ALBUMIN SERPL-MCNC: 3.5 G/DL (ref 3.5–5.2)
ALP SERPL-CCNC: 191 U/L (ref 40–129)
ALP SERPL-CCNC: 193 U/L (ref 40–129)
ALT SERPL-CCNC: 12 U/L (ref 0–40)
ALT SERPL-CCNC: 13 U/L (ref 0–40)
ANION GAP SERPL CALCULATED.3IONS-SCNC: 16 MMOL/L (ref 7–16)
ANION GAP SERPL CALCULATED.3IONS-SCNC: 17 MMOL/L (ref 7–16)
AST SERPL-CCNC: 50 U/L (ref 0–39)
AST SERPL-CCNC: 50 U/L (ref 0–39)
BASOPHILS # BLD: 0.03 K/UL (ref 0–0.2)
BASOPHILS NFR BLD: 1 % (ref 0–2)
BILIRUB SERPL-MCNC: 1.2 MG/DL (ref 0–1.2)
BILIRUB SERPL-MCNC: 1.2 MG/DL (ref 0–1.2)
BUN SERPL-MCNC: 10 MG/DL (ref 6–23)
BUN SERPL-MCNC: 9 MG/DL (ref 6–23)
CALCIUM SERPL-MCNC: 8.5 MG/DL (ref 8.6–10.2)
CALCIUM SERPL-MCNC: 8.6 MG/DL (ref 8.6–10.2)
CHLORIDE SERPL-SCNC: 102 MMOL/L (ref 98–107)
CHLORIDE SERPL-SCNC: 99 MMOL/L (ref 98–107)
CO2 SERPL-SCNC: 20 MMOL/L (ref 22–29)
CO2 SERPL-SCNC: 20 MMOL/L (ref 22–29)
CREAT SERPL-MCNC: 0.5 MG/DL (ref 0.7–1.2)
CREAT SERPL-MCNC: 0.6 MG/DL (ref 0.7–1.2)
EOSINOPHIL # BLD: 0.04 K/UL (ref 0.05–0.5)
EOSINOPHILS RELATIVE PERCENT: 1 % (ref 0–6)
ERYTHROCYTE [DISTWIDTH] IN BLOOD BY AUTOMATED COUNT: 14.8 % (ref 11.5–15)
GFR SERPL CREATININE-BSD FRML MDRD: >60 ML/MIN/1.73M2
GFR SERPL CREATININE-BSD FRML MDRD: >60 ML/MIN/1.73M2
GLUCOSE SERPL-MCNC: 84 MG/DL (ref 74–99)
GLUCOSE SERPL-MCNC: 86 MG/DL (ref 74–99)
HCT VFR BLD AUTO: 27 % (ref 37–54)
HGB BLD-MCNC: 8.7 G/DL (ref 12.5–16.5)
IMM GRANULOCYTES # BLD AUTO: <0.03 K/UL (ref 0–0.58)
IMM GRANULOCYTES NFR BLD: 0 % (ref 0–5)
LYMPHOCYTES NFR BLD: 1.46 K/UL (ref 1.5–4)
LYMPHOCYTES RELATIVE PERCENT: 28 % (ref 20–42)
MAGNESIUM SERPL-MCNC: 1.7 MG/DL (ref 1.6–2.6)
MCH RBC QN AUTO: 30 PG (ref 26–35)
MCHC RBC AUTO-ENTMCNC: 32.2 G/DL (ref 32–34.5)
MCV RBC AUTO: 93.1 FL (ref 80–99.9)
MICROORGANISM SPEC CULT: ABNORMAL
MONOCYTES NFR BLD: 0.56 K/UL (ref 0.1–0.95)
MONOCYTES NFR BLD: 11 % (ref 2–12)
NEUTROPHILS NFR BLD: 59 % (ref 43–80)
NEUTS SEG NFR BLD: 3.06 K/UL (ref 1.8–7.3)
PLATELET # BLD AUTO: 109 K/UL (ref 130–450)
PMV BLD AUTO: 11.5 FL (ref 7–12)
POTASSIUM SERPL-SCNC: 3.4 MMOL/L (ref 3.5–5)
POTASSIUM SERPL-SCNC: 4 MMOL/L (ref 3.5–5)
PROT SERPL-MCNC: 7 G/DL (ref 6.4–8.3)
PROT SERPL-MCNC: 7.1 G/DL (ref 6.4–8.3)
RBC # BLD AUTO: 2.9 M/UL (ref 3.8–5.8)
SODIUM SERPL-SCNC: 135 MMOL/L (ref 132–146)
SODIUM SERPL-SCNC: 139 MMOL/L (ref 132–146)
SPECIMEN DESCRIPTION: ABNORMAL
WBC OTHER # BLD: 5.2 K/UL (ref 4.5–11.5)

## 2023-12-19 PROCEDURE — 83735 ASSAY OF MAGNESIUM: CPT

## 2023-12-19 PROCEDURE — 6360000002 HC RX W HCPCS: Performed by: NURSE PRACTITIONER

## 2023-12-19 PROCEDURE — 1200000000 HC SEMI PRIVATE

## 2023-12-19 PROCEDURE — 2580000003 HC RX 258: Performed by: NURSE PRACTITIONER

## 2023-12-19 PROCEDURE — 80053 COMPREHEN METABOLIC PANEL: CPT

## 2023-12-19 PROCEDURE — 36415 COLL VENOUS BLD VENIPUNCTURE: CPT

## 2023-12-19 PROCEDURE — 2580000003 HC RX 258: Performed by: STUDENT IN AN ORGANIZED HEALTH CARE EDUCATION/TRAINING PROGRAM

## 2023-12-19 PROCEDURE — 6370000000 HC RX 637 (ALT 250 FOR IP): Performed by: STUDENT IN AN ORGANIZED HEALTH CARE EDUCATION/TRAINING PROGRAM

## 2023-12-19 PROCEDURE — 51798 US URINE CAPACITY MEASURE: CPT

## 2023-12-19 PROCEDURE — 85025 COMPLETE CBC W/AUTO DIFF WBC: CPT

## 2023-12-19 PROCEDURE — 6370000000 HC RX 637 (ALT 250 FOR IP): Performed by: NURSE PRACTITIONER

## 2023-12-19 RX ADMIN — FOLIC ACID 1 MG: 1 TABLET ORAL at 09:00

## 2023-12-19 RX ADMIN — Medication 100 MG: at 09:00

## 2023-12-19 RX ADMIN — WATER 1000 MG: 1 INJECTION INTRAMUSCULAR; INTRAVENOUS; SUBCUTANEOUS at 06:34

## 2023-12-19 RX ADMIN — SODIUM CHLORIDE, PRESERVATIVE FREE 10 ML: 5 INJECTION INTRAVENOUS at 09:01

## 2023-12-19 RX ADMIN — TAMSULOSIN HYDROCHLORIDE 0.4 MG: 0.4 CAPSULE ORAL at 09:00

## 2023-12-19 RX ADMIN — POTASSIUM BICARBONATE 40 MEQ: 782 TABLET, EFFERVESCENT ORAL at 10:35

## 2023-12-19 RX ADMIN — METOPROLOL SUCCINATE 25 MG: 25 TABLET, EXTENDED RELEASE ORAL at 09:00

## 2023-12-19 NOTE — PROGRESS NOTES
Internal Medicine Progress Note    Patient's name: Jairo Bowen  : 1955  Chief complaints (on day of admission): Hematuria (Hematuria starting this morning. Patient admitted for this around gi. Patient's wife states there are large amounts of clots in the urine.)  Admission date: 2023  Date of service: 2023   Room: [address]  Primary care physician: Risa Medellin MD  Reason for visit: Follow-up for hematuria     Subjective  [facility] was seen and examined at bedside     Doing well  Told nursing he drinks 4 bottles of vodka per night  Told me today same story as in past that his drinking is only intermittent and rare   Not sure what the story is here exactly  Will ask peer recovery to see  Does to appear to be actively withdrawing at this time   Still having hematuria   Continue IVFs   Wife at bedside     Review of Systems  There are no new complaints of chest pain, shortness of breath, abdominal pain, nausea, vomiting, diarrhea, constipation unless otherwise mentioned above.      Hospital Medications  Current Facility-Administered Medications   Medication Dose Route Frequency Provider Last Rate Last Admin    [Held by provider] furosemide (LASIX) tablet 20 mg  20 mg Oral Daily LacMary valdez APRN - CNP        gabapentin (NEURONTIN) capsule 100 mg  100 mg Oral Q12H PRN CARYN Oconnor - CNP        [Held by provider] losartan (COZAAR) tablet 100 mg  100 mg Oral Daily LacivMary tran APRN - CNP        metoprolol succinate (TOPROL XL) extended release tablet 25 mg  25 mg Oral Daily LacivMary tran APRN - CNP   25 mg at 23 0900    tamsulosin (FLOMAX) capsule 0.4 mg  0.4 mg Oral Daily LacivMary tran APRN - CNP   0.4 mg at 23 0900    sodium chloride flush 0.9 % injection 10 mL  10 mL IntraVENous 2 times per day CARYN Oconnor CNP   10 mL at 23 0901    sodium chloride flush 0.9 % injection 10 mL  10 mL IntraVENous PRN Curry Spence APRILENE - CNP 100 mg  100 mg Oral Daily Trever Anderson MD   100 mg at 12/19/23 0900       PRN Medications  gabapentin, sodium chloride flush, sodium chloride, potassium chloride **OR** potassium alternative oral replacement **OR** potassium chloride, magnesium sulfate, ondansetron **OR** ondansetron, senna, acetaminophen **OR** acetaminophen, sodium chloride flush, sodium chloride, LORazepam **OR** LORazepam **OR** [DISCONTINUED] LORazepam **OR** [DISCONTINUED] LORazepam **OR** [DISCONTINUED] LORazepam **OR** [DISCONTINUED] LORazepam **OR** [DISCONTINUED] LORazepam **OR** [DISCONTINUED] LORazepam    Objective  Most Recent Recorded Vitals  /86   Pulse 59   Temp 98.6 °F (37 °C) (Oral)   Resp 18   Ht 1.803 m (5' 11\")   Wt 76.4 kg (168 lb 6.9 oz)   SpO2 100%   BMI 23.49 kg/m²   I/O last 3 completed shifts:  In: -   Out: 445 [Urine:445]  I/O this shift:  In: -   Out: 776 [Urine:776]    Physical Exam:  General: AAO to person/place/time/purpose, NAD, no labored breathing  Eyes: conjunctivae/corneas clear, sclera non icteric  Skin: color/texture/turgor normal, no rashes or lesions  Lungs: CTAB, no retractions/use of accessory muscles, no vocal fremitus, no rhonchi, no crackle, no rales  Heart: regular rate, regular rhythm, no murmur  Abdomen: soft, NT, bowel sounds normal  Extremities: atraumatic, no edema  Neurologic: cranial nerves 2-12 grossly intact, no slurred speech    Most Recent Labs  Lab Results   Component Value Date    WBC 5.2 12/19/2023    HGB 8.7 (L) 12/19/2023    HCT 27.0 (L) 12/19/2023     (L) 12/19/2023     12/19/2023    K 3.4 (L) 12/19/2023     12/19/2023    CREATININE 0.6 (L) 12/19/2023    BUN 9 12/19/2023    CO2 20 (L) 12/19/2023    GLUCOSE 86 12/19/2023    ALT 12 12/19/2023    AST 50 (H) 12/19/2023    INR 1.1 12/18/2023    APTT 35.0 12/18/2023    LABA1C 4.9 11/02/2023       CT ABDOMEN PELVIS WO CONTRAST Additional Contrast? None   Final Result   Layering hyperattenuating material within  the bladder, possibly blood   products. No radiopaque renal stone. No hydronephrosis. Echocardiogram       Assessment   Active Hospital Problems    Diagnosis     Coronary artery disease involving native coronary artery of native heart without angina pectoris [I25.10]     Sinus tachycardia [R00.0]     Tobacco abuse [Z72.0]     Hematuria [R31.9]     History of prostate cancer [Z85.46]     Alcohol abuse [F10.10]     Paroxysmal atrial fibrillation (HCC) [I48.0]          Plan  Hematuria with cystitis   CT showing blood in bladder  Urology consult   Rocephin   UA +  Follow urine culture   Continue IVFs   Spoke with urology, cysto on Thursday     Alcohol use / hx of abuse   Peer recovery to speak with patient   Ciwa ordered   Talked to patient at length     PT/OT  Home medications to be reconciled and confirmed prior to being ordered  DVT prophylaxis   Code Status Full   Discharge plan: home once cleared by urology     Electronically signed by Xiao Jack MD on 12/19/2023 at 12:01 PM    I can be reached through [facility].

## 2023-12-19 NOTE — FLOWSHEET NOTE
12/19/23 0759   Output (mL)   Urine 125 mL   Urine Assessment   Urinary Status Voiding   Urinary Incontinence Present   Urine Color Red   Urine Appearance Hazy   Urine Odor Malodorous

## 2023-12-19 NOTE — CARE COORDINATION
12/19/23 Update CM Note: Patient continues to urinate clots/red urine. He continues to refuse the arboleda catheter. Urology in to see patient and urines are being collected. He is on iv rocephin/iv flds and flomax. Urine cytology/culture are pending. H/H today 8.7 was 9.7 yesterday. Per urology plan is for a cysto. Patient placed on iv ativan for alcohol withdrawls. Peer recovery notified to see patient. Plan is for patient to return home.  Electronically signed by Laura Abreu RN CM on 12/19/2023 at 9:44 AM

## 2023-12-20 LAB
ALBUMIN SERPL-MCNC: 3.4 G/DL (ref 3.5–5.2)
ALP SERPL-CCNC: 173 U/L (ref 40–129)
ALT SERPL-CCNC: 10 U/L (ref 0–40)
ANION GAP SERPL CALCULATED.3IONS-SCNC: 13 MMOL/L (ref 7–16)
AST SERPL-CCNC: 44 U/L (ref 0–39)
BASOPHILS # BLD: 0.02 K/UL (ref 0–0.2)
BASOPHILS NFR BLD: 0 % (ref 0–2)
BILIRUB SERPL-MCNC: 0.9 MG/DL (ref 0–1.2)
BUN SERPL-MCNC: 6 MG/DL (ref 6–23)
CALCIUM SERPL-MCNC: 8.5 MG/DL (ref 8.6–10.2)
CHLORIDE SERPL-SCNC: 102 MMOL/L (ref 98–107)
CO2 SERPL-SCNC: 23 MMOL/L (ref 22–29)
CREAT SERPL-MCNC: 0.5 MG/DL (ref 0.7–1.2)
EOSINOPHIL # BLD: 0.06 K/UL (ref 0.05–0.5)
EOSINOPHILS RELATIVE PERCENT: 1 % (ref 0–6)
ERYTHROCYTE [DISTWIDTH] IN BLOOD BY AUTOMATED COUNT: 14.7 % (ref 11.5–15)
GFR SERPL CREATININE-BSD FRML MDRD: >60 ML/MIN/1.73M2
GLUCOSE SERPL-MCNC: 82 MG/DL (ref 74–99)
HCT VFR BLD AUTO: 23.4 % (ref 37–54)
HGB BLD-MCNC: 7.8 G/DL (ref 12.5–16.5)
IMM GRANULOCYTES # BLD AUTO: <0.03 K/UL (ref 0–0.58)
IMM GRANULOCYTES NFR BLD: 0 % (ref 0–5)
LYMPHOCYTES NFR BLD: 1.53 K/UL (ref 1.5–4)
LYMPHOCYTES RELATIVE PERCENT: 30 % (ref 20–42)
MAGNESIUM SERPL-MCNC: 1.5 MG/DL (ref 1.6–2.6)
MCH RBC QN AUTO: 31.2 PG (ref 26–35)
MCHC RBC AUTO-ENTMCNC: 33.3 G/DL (ref 32–34.5)
MCV RBC AUTO: 93.6 FL (ref 80–99.9)
MONOCYTES NFR BLD: 0.57 K/UL (ref 0.1–0.95)
MONOCYTES NFR BLD: 11 % (ref 2–12)
NEUTROPHILS NFR BLD: 57 % (ref 43–80)
NEUTS SEG NFR BLD: 2.91 K/UL (ref 1.8–7.3)
NON-GYN CYTOLOGY REPORT: NORMAL
PLATELET CONFIRMATION: NORMAL
PLATELET, FLUORESCENCE: 95 K/UL (ref 130–450)
PMV BLD AUTO: 11.7 FL (ref 7–12)
POTASSIUM SERPL-SCNC: 3.2 MMOL/L (ref 3.5–5)
PROT SERPL-MCNC: 6.6 G/DL (ref 6.4–8.3)
RBC # BLD AUTO: 2.5 M/UL (ref 3.8–5.8)
SODIUM SERPL-SCNC: 138 MMOL/L (ref 132–146)
WBC OTHER # BLD: 5.1 K/UL (ref 4.5–11.5)

## 2023-12-20 PROCEDURE — 1200000000 HC SEMI PRIVATE

## 2023-12-20 PROCEDURE — 6370000000 HC RX 637 (ALT 250 FOR IP): Performed by: STUDENT IN AN ORGANIZED HEALTH CARE EDUCATION/TRAINING PROGRAM

## 2023-12-20 PROCEDURE — 2580000003 HC RX 258: Performed by: NURSE PRACTITIONER

## 2023-12-20 PROCEDURE — 6360000002 HC RX W HCPCS: Performed by: STUDENT IN AN ORGANIZED HEALTH CARE EDUCATION/TRAINING PROGRAM

## 2023-12-20 PROCEDURE — 6360000002 HC RX W HCPCS: Performed by: NURSE PRACTITIONER

## 2023-12-20 PROCEDURE — 2580000003 HC RX 258: Performed by: STUDENT IN AN ORGANIZED HEALTH CARE EDUCATION/TRAINING PROGRAM

## 2023-12-20 PROCEDURE — 85025 COMPLETE CBC W/AUTO DIFF WBC: CPT

## 2023-12-20 PROCEDURE — 80053 COMPREHEN METABOLIC PANEL: CPT

## 2023-12-20 PROCEDURE — 36415 COLL VENOUS BLD VENIPUNCTURE: CPT

## 2023-12-20 PROCEDURE — 83735 ASSAY OF MAGNESIUM: CPT

## 2023-12-20 PROCEDURE — 6370000000 HC RX 637 (ALT 250 FOR IP): Performed by: NURSE PRACTITIONER

## 2023-12-20 RX ORDER — MAGNESIUM SULFATE IN WATER 40 MG/ML
2000 INJECTION, SOLUTION INTRAVENOUS ONCE
Status: COMPLETED | OUTPATIENT
Start: 2023-12-20 | End: 2023-12-20

## 2023-12-20 RX ORDER — LANOLIN ALCOHOL/MO/W.PET/CERES
400 CREAM (GRAM) TOPICAL ONCE
Status: COMPLETED | OUTPATIENT
Start: 2023-12-20 | End: 2023-12-20

## 2023-12-20 RX ADMIN — Medication 400 MG: at 09:20

## 2023-12-20 RX ADMIN — TAMSULOSIN HYDROCHLORIDE 0.4 MG: 0.4 CAPSULE ORAL at 08:50

## 2023-12-20 RX ADMIN — METOPROLOL SUCCINATE 25 MG: 25 TABLET, EXTENDED RELEASE ORAL at 08:50

## 2023-12-20 RX ADMIN — SODIUM CHLORIDE, POTASSIUM CHLORIDE, SODIUM LACTATE AND CALCIUM CHLORIDE: 600; 310; 30; 20 INJECTION, SOLUTION INTRAVENOUS at 09:12

## 2023-12-20 RX ADMIN — FOLIC ACID 1 MG: 1 TABLET ORAL at 08:50

## 2023-12-20 RX ADMIN — WATER 1000 MG: 1 INJECTION INTRAMUSCULAR; INTRAVENOUS; SUBCUTANEOUS at 06:09

## 2023-12-20 RX ADMIN — MAGNESIUM SULFATE HEPTAHYDRATE 2000 MG: 40 INJECTION, SOLUTION INTRAVENOUS at 09:17

## 2023-12-20 RX ADMIN — SODIUM CHLORIDE, PRESERVATIVE FREE 10 ML: 5 INJECTION INTRAVENOUS at 08:56

## 2023-12-20 RX ADMIN — POTASSIUM BICARBONATE 40 MEQ: 782 TABLET, EFFERVESCENT ORAL at 09:17

## 2023-12-20 RX ADMIN — Medication 100 MG: at 08:49

## 2023-12-20 NOTE — CARE COORDINATION
12/20/23 Update CM Note: Patient remains on general medical floor. He remains on iv rocephin qd/iv flds. He will be npo after midnight for cysto in the am. He is receiving iv mag for mg 1.5 today. K 3.2 and oral given. Hgb 7.8 today with urine continued with clots and red in color. Plan is to return home. Will follow for any home going needs.  Electronically signed by Malini Weller RN CM on 12/20/2023 at 9:35 AM

## 2023-12-21 ENCOUNTER — APPOINTMENT (OUTPATIENT)
Dept: GENERAL RADIOLOGY | Age: 68
DRG: 664 | End: 2023-12-21
Payer: MEDICARE

## 2023-12-21 LAB
ALBUMIN SERPL-MCNC: 3.2 G/DL (ref 3.5–5.2)
ALP SERPL-CCNC: 160 U/L (ref 40–129)
ALT SERPL-CCNC: 11 U/L (ref 0–40)
ANION GAP SERPL CALCULATED.3IONS-SCNC: 13 MMOL/L (ref 7–16)
AST SERPL-CCNC: 50 U/L (ref 0–39)
BASOPHILS # BLD: 0.01 K/UL (ref 0–0.2)
BASOPHILS NFR BLD: 0 % (ref 0–2)
BILIRUB SERPL-MCNC: 0.6 MG/DL (ref 0–1.2)
BUN SERPL-MCNC: 6 MG/DL (ref 6–23)
CALCIUM SERPL-MCNC: 8.2 MG/DL (ref 8.6–10.2)
CHLORIDE SERPL-SCNC: 104 MMOL/L (ref 98–107)
CO2 SERPL-SCNC: 21 MMOL/L (ref 22–29)
CREAT SERPL-MCNC: 0.5 MG/DL (ref 0.7–1.2)
EOSINOPHIL # BLD: 0.05 K/UL (ref 0.05–0.5)
EOSINOPHILS RELATIVE PERCENT: 1 % (ref 0–6)
ERYTHROCYTE [DISTWIDTH] IN BLOOD BY AUTOMATED COUNT: 14.7 % (ref 11.5–15)
GFR SERPL CREATININE-BSD FRML MDRD: >60 ML/MIN/1.73M2
GLUCOSE SERPL-MCNC: 86 MG/DL (ref 74–99)
HCT VFR BLD AUTO: 21.3 % (ref 37–54)
HCT VFR BLD AUTO: 25.8 % (ref 37–54)
HGB BLD-MCNC: 6.8 G/DL (ref 12.5–16.5)
HGB BLD-MCNC: 8.1 G/DL (ref 12.5–16.5)
IMM GRANULOCYTES # BLD AUTO: <0.03 K/UL (ref 0–0.58)
IMM GRANULOCYTES NFR BLD: 0 % (ref 0–5)
LYMPHOCYTES NFR BLD: 1.34 K/UL (ref 1.5–4)
LYMPHOCYTES RELATIVE PERCENT: 30 % (ref 20–42)
MAGNESIUM SERPL-MCNC: 1.7 MG/DL (ref 1.6–2.6)
MCH RBC QN AUTO: 29.7 PG (ref 26–35)
MCHC RBC AUTO-ENTMCNC: 31.9 G/DL (ref 32–34.5)
MCV RBC AUTO: 93 FL (ref 80–99.9)
MONOCYTES NFR BLD: 0.5 K/UL (ref 0.1–0.95)
MONOCYTES NFR BLD: 11 % (ref 2–12)
NEUTROPHILS NFR BLD: 57 % (ref 43–80)
NEUTS SEG NFR BLD: 2.57 K/UL (ref 1.8–7.3)
PLATELET, FLUORESCENCE: 101 K/UL (ref 130–450)
PMV BLD AUTO: 11.5 FL (ref 7–12)
POTASSIUM SERPL-SCNC: 3.4 MMOL/L (ref 3.5–5)
PROT SERPL-MCNC: 6.6 G/DL (ref 6.4–8.3)
RBC # BLD AUTO: 2.29 M/UL (ref 3.8–5.8)
SODIUM SERPL-SCNC: 138 MMOL/L (ref 132–146)
WBC OTHER # BLD: 4.5 K/UL (ref 4.5–11.5)

## 2023-12-21 PROCEDURE — 6360000002 HC RX W HCPCS: Performed by: NURSE PRACTITIONER

## 2023-12-21 PROCEDURE — BT141ZZ FLUOROSCOPY OF KIDNEYS, URETERS AND BLADDER USING LOW OSMOLAR CONTRAST: ICD-10-PCS | Performed by: UROLOGY

## 2023-12-21 PROCEDURE — 85025 COMPLETE CBC W/AUTO DIFF WBC: CPT

## 2023-12-21 PROCEDURE — 2709999900 HC NON-CHARGEABLE SUPPLY: Performed by: UROLOGY

## 2023-12-21 PROCEDURE — 2580000003 HC RX 258: Performed by: STUDENT IN AN ORGANIZED HEALTH CARE EDUCATION/TRAINING PROGRAM

## 2023-12-21 PROCEDURE — 80053 COMPREHEN METABOLIC PANEL: CPT

## 2023-12-21 PROCEDURE — 83735 ASSAY OF MAGNESIUM: CPT

## 2023-12-21 PROCEDURE — 86901 BLOOD TYPING SEROLOGIC RH(D): CPT

## 2023-12-21 PROCEDURE — P9016 RBC LEUKOCYTES REDUCED: HCPCS

## 2023-12-21 PROCEDURE — 1200000000 HC SEMI PRIVATE

## 2023-12-21 PROCEDURE — C1769 GUIDE WIRE: HCPCS | Performed by: UROLOGY

## 2023-12-21 PROCEDURE — 2580000003 HC RX 258: Performed by: NURSE PRACTITIONER

## 2023-12-21 PROCEDURE — 3700000000 HC ANESTHESIA ATTENDED CARE: Performed by: UROLOGY

## 2023-12-21 PROCEDURE — 85018 HEMOGLOBIN: CPT

## 2023-12-21 PROCEDURE — 0W3R8ZZ CONTROL BLEEDING IN GENITOURINARY TRACT, VIA NATURAL OR ARTIFICIAL OPENING ENDOSCOPIC: ICD-10-PCS | Performed by: UROLOGY

## 2023-12-21 PROCEDURE — 6370000000 HC RX 637 (ALT 250 FOR IP): Performed by: STUDENT IN AN ORGANIZED HEALTH CARE EDUCATION/TRAINING PROGRAM

## 2023-12-21 PROCEDURE — 36415 COLL VENOUS BLD VENIPUNCTURE: CPT

## 2023-12-21 PROCEDURE — 7100000001 HC PACU RECOVERY - ADDTL 15 MIN: Performed by: UROLOGY

## 2023-12-21 PROCEDURE — 6370000000 HC RX 637 (ALT 250 FOR IP): Performed by: UROLOGY

## 2023-12-21 PROCEDURE — 36430 TRANSFUSION BLD/BLD COMPNT: CPT

## 2023-12-21 PROCEDURE — 3600000013 HC SURGERY LEVEL 3 ADDTL 15MIN: Performed by: UROLOGY

## 2023-12-21 PROCEDURE — 3600000003 HC SURGERY LEVEL 3 BASE: Performed by: UROLOGY

## 2023-12-21 PROCEDURE — 3700000001 HC ADD 15 MINUTES (ANESTHESIA): Performed by: UROLOGY

## 2023-12-21 PROCEDURE — 7100000000 HC PACU RECOVERY - FIRST 15 MIN: Performed by: UROLOGY

## 2023-12-21 PROCEDURE — 2580000003 HC RX 258: Performed by: UROLOGY

## 2023-12-21 PROCEDURE — 88112 CYTOPATH CELL ENHANCE TECH: CPT

## 2023-12-21 PROCEDURE — 86923 COMPATIBILITY TEST ELECTRIC: CPT

## 2023-12-21 PROCEDURE — 86850 RBC ANTIBODY SCREEN: CPT

## 2023-12-21 PROCEDURE — 6360000002 HC RX W HCPCS: Performed by: STUDENT IN AN ORGANIZED HEALTH CARE EDUCATION/TRAINING PROGRAM

## 2023-12-21 PROCEDURE — 85014 HEMATOCRIT: CPT

## 2023-12-21 PROCEDURE — 86900 BLOOD TYPING SEROLOGIC ABO: CPT

## 2023-12-21 PROCEDURE — C1758 CATHETER, URETERAL: HCPCS | Performed by: UROLOGY

## 2023-12-21 RX ORDER — HYDROMORPHONE HYDROCHLORIDE 1 MG/ML
0.5 INJECTION, SOLUTION INTRAMUSCULAR; INTRAVENOUS; SUBCUTANEOUS EVERY 5 MIN PRN
Status: DISCONTINUED | OUTPATIENT
Start: 2023-12-21 | End: 2023-12-21 | Stop reason: HOSPADM

## 2023-12-21 RX ORDER — SODIUM CHLORIDE 0.9 % (FLUSH) 0.9 %
5-40 SYRINGE (ML) INJECTION EVERY 12 HOURS SCHEDULED
Status: DISCONTINUED | OUTPATIENT
Start: 2023-12-21 | End: 2023-12-21 | Stop reason: HOSPADM

## 2023-12-21 RX ORDER — OXYCODONE HYDROCHLORIDE 5 MG/1
5 TABLET ORAL EVERY 6 HOURS PRN
Status: DISCONTINUED | OUTPATIENT
Start: 2023-12-21 | End: 2023-12-22 | Stop reason: HOSPADM

## 2023-12-21 RX ORDER — MORPHINE SULFATE 2 MG/ML
2 INJECTION, SOLUTION INTRAMUSCULAR; INTRAVENOUS ONCE
Status: COMPLETED | OUTPATIENT
Start: 2023-12-21 | End: 2023-12-21

## 2023-12-21 RX ORDER — MEPERIDINE HYDROCHLORIDE 25 MG/ML
12.5 INJECTION INTRAMUSCULAR; INTRAVENOUS; SUBCUTANEOUS EVERY 5 MIN PRN
Status: DISCONTINUED | OUTPATIENT
Start: 2023-12-21 | End: 2023-12-21 | Stop reason: HOSPADM

## 2023-12-21 RX ORDER — SODIUM CHLORIDE 0.9 % (FLUSH) 0.9 %
5-40 SYRINGE (ML) INJECTION PRN
Status: DISCONTINUED | OUTPATIENT
Start: 2023-12-21 | End: 2023-12-21 | Stop reason: HOSPADM

## 2023-12-21 RX ORDER — SODIUM CHLORIDE, SODIUM LACTATE, POTASSIUM CHLORIDE, CALCIUM CHLORIDE 600; 310; 30; 20 MG/100ML; MG/100ML; MG/100ML; MG/100ML
INJECTION, SOLUTION INTRAVENOUS CONTINUOUS
Status: DISCONTINUED | OUTPATIENT
Start: 2023-12-21 | End: 2023-12-22

## 2023-12-21 RX ORDER — SODIUM CHLORIDE 9 MG/ML
INJECTION, SOLUTION INTRAVENOUS PRN
Status: DISCONTINUED | OUTPATIENT
Start: 2023-12-21 | End: 2023-12-22

## 2023-12-21 RX ORDER — SODIUM CHLORIDE 9 MG/ML
INJECTION, SOLUTION INTRAVENOUS PRN
Status: DISCONTINUED | OUTPATIENT
Start: 2023-12-21 | End: 2023-12-21 | Stop reason: HOSPADM

## 2023-12-21 RX ADMIN — Medication 100 MG: at 15:57

## 2023-12-21 RX ADMIN — WATER 1000 MG: 1 INJECTION INTRAMUSCULAR; INTRAVENOUS; SUBCUTANEOUS at 06:27

## 2023-12-21 RX ADMIN — MORPHINE SULFATE 2 MG: 2 INJECTION, SOLUTION INTRAMUSCULAR; INTRAVENOUS at 16:56

## 2023-12-21 RX ADMIN — SODIUM CHLORIDE, POTASSIUM CHLORIDE, SODIUM LACTATE AND CALCIUM CHLORIDE: 600; 310; 30; 20 INJECTION, SOLUTION INTRAVENOUS at 01:51

## 2023-12-21 RX ADMIN — ACETAMINOPHEN 650 MG: 325 TABLET ORAL at 15:55

## 2023-12-21 RX ADMIN — METOPROLOL SUCCINATE 25 MG: 25 TABLET, EXTENDED RELEASE ORAL at 15:56

## 2023-12-21 RX ADMIN — FOLIC ACID 1 MG: 1 TABLET ORAL at 15:55

## 2023-12-21 RX ADMIN — OXYCODONE 5 MG: 5 TABLET ORAL at 20:48

## 2023-12-21 RX ADMIN — TAMSULOSIN HYDROCHLORIDE 0.4 MG: 0.4 CAPSULE ORAL at 15:57

## 2023-12-21 RX ADMIN — SODIUM CHLORIDE, POTASSIUM CHLORIDE, SODIUM LACTATE AND CALCIUM CHLORIDE: 600; 310; 30; 20 INJECTION, SOLUTION INTRAVENOUS at 15:48

## 2023-12-21 RX ADMIN — POTASSIUM BICARBONATE 40 MEQ: 782 TABLET, EFFERVESCENT ORAL at 15:58

## 2023-12-21 ASSESSMENT — PAIN SCALES - GENERAL
PAINLEVEL_OUTOF10: 8
PAINLEVEL_OUTOF10: 0
PAINLEVEL_OUTOF10: 4
PAINLEVEL_OUTOF10: 10
PAINLEVEL_OUTOF10: 4
PAINLEVEL_OUTOF10: 8
PAINLEVEL_OUTOF10: 5

## 2023-12-21 ASSESSMENT — PAIN DESCRIPTION - DESCRIPTORS
DESCRIPTORS: ACHING;DISCOMFORT;BURNING;SHARP
DESCRIPTORS: BURNING;DISCOMFORT;SHARP
DESCRIPTORS: DISCOMFORT;PRESSURE

## 2023-12-21 ASSESSMENT — PAIN DESCRIPTION - PAIN TYPE
TYPE: ACUTE PAIN;SURGICAL PAIN

## 2023-12-21 ASSESSMENT — PAIN - FUNCTIONAL ASSESSMENT
PAIN_FUNCTIONAL_ASSESSMENT: PREVENTS OR INTERFERES SOME ACTIVE ACTIVITIES AND ADLS
PAIN_FUNCTIONAL_ASSESSMENT: PREVENTS OR INTERFERES SOME ACTIVE ACTIVITIES AND ADLS

## 2023-12-21 ASSESSMENT — PAIN DESCRIPTION - ORIENTATION
ORIENTATION: MID
ORIENTATION: ANTERIOR;MID
ORIENTATION: LOWER;ANTERIOR

## 2023-12-21 ASSESSMENT — PAIN DESCRIPTION - LOCATION
LOCATION: PENIS
LOCATION: PENIS;GROIN
LOCATION: PELVIS

## 2023-12-21 ASSESSMENT — PAIN DESCRIPTION - FREQUENCY
FREQUENCY: INTERMITTENT
FREQUENCY: CONTINUOUS
FREQUENCY: CONTINUOUS

## 2023-12-21 ASSESSMENT — PAIN DESCRIPTION - ONSET
ONSET: ON-GOING
ONSET: ON-GOING

## 2023-12-21 NOTE — PROGRESS NOTES
Called [facility]rology answering service to request possible pain medication order per patient request.

## 2023-12-21 NOTE — PROGRESS NOTES
Internal Medicine Progress Note    Patient's name: Brianna Cline  : 1955  Chief complaints (on day of admission): Hematuria (Hematuria starting this morning. Patient admitted for this around Thanksgiving. Patient's wife states there are large amounts of clots in the urine.)  Admission date: 2023  Date of service: 2023   Room: [address]  Primary care physician: Huma Tapia MD  Reason for visit: Follow-up for hematuria     Subjective  Lc Duran was seen and examined at bedside     Anemic   1 unit prbc   He consents to this   Going for cysto   Spoke to urology   No other issues at this time     Review of Systems  There are no new complaints of chest pain, shortness of breath, abdominal pain, nausea, vomiting, diarrhea, constipation unless otherwise mentioned above.      Hospital Medications  Current Facility-Administered Medications   Medication Dose Route Frequency Provider Last Rate Last Admin    0.9 % sodium chloride infusion   IntraVENous PRN LacMary valdez, APRN - CNP        [Held by provider] furosemide (LASIX) tablet 20 mg  20 mg Oral Daily LacivMary tran APRN - CNP        gabapentin (NEURONTIN) capsule 100 mg  100 mg Oral Q12H PRN Rocky Mealjeff, APRN - CNP        [Held by provider] losartan (COZAAR) tablet 100 mg  100 mg Oral Daily LacivMary tran, APRN - CNP        metoprolol succinate (TOPROL XL) extended release tablet 25 mg  25 mg Oral Daily Rocky Mealy, APRN - CNP   25 mg at 23 0850    tamsulosin (FLOMAX) capsule 0.4 mg  0.4 mg Oral Daily Rocky Mealy, APRN - CNP   0.4 mg at 23 0850    sodium chloride flush 0.9 % injection 10 mL  10 mL IntraVENous 2 times per day Rocky Mealy, APRN - CNP   10 mL at 23 0856    sodium chloride flush 0.9 % injection 10 mL  10 mL IntraVENous PRN LacivMary tran APRN - CNP        0.9 % sodium chloride infusion   IntraVENous PRN Rocky Mealjeff, APRN - CNP        potassium chloride (KLOR-CON M) extended release

## 2023-12-21 NOTE — BRIEF OP NOTE
Brief Postoperative Note      Patient: José Miguel Dejesus  YOB: 1955  MRN: [id number]    Date of Procedure: 12/21/2023    Pre-Op Diagnosis Codes:     * Gross hematuria [R31.0]    Post-Op Diagnosis: Same radiation cytitis       Procedure(s):  CYSTOSCOPY BILATERAL RETROGRADE PYELOGRAM CLOT EVACUATION, FULGURATION BLADDER, insertion of arboleda catheter    Surgeon(s):  Rasheed Rios MD    Assistant:      Anesthesia: Monitor Anesthesia Care    Estimated Blood Loss (mL): less than 50     Complications: None    Specimens:   ID Type Source Tests Collected by Time Destination   A : URINE FOR CYTOLOGY Urine Urine, Cystoscopic CYTOLOGY, NON-GYN Mateo Mcdonald MD 12/21/2023 1304        Implants:  * No implants in log *      Drains:   Urinary Catheter 12/21/23 Arboleda;[address] Way (Active)       [REMOVED] Urinary Catheter 11/17/23 (Removed)   Catheter Indications Urinary retention (acute or chronic), continuous bladder irrigation or bladder outlet obstruction 11/19/23 0941   Site Assessment No urethral drainage 11/19/23 0941   Urine Color Addie 11/19/23 0941   Urine Appearance Clear 11/19/23 0941   Urine Odor Malodorous 11/19/23 0800   Collection Container Standard 11/19/23 0941   Securement Method Securing device (Describe) 11/19/23 0941   Catheter Care  Soap and water 11/19/23 0452   Catheter Best Practices  Drainage tube clipped to bed;Catheter secured to thigh; Bag below bladder;Bag not on floor; Tamper seal intact; Lack of dependent loop in tubing;Drainage bag less than half full 11/19/23 0941   Status Manually irrigated 11/19/23 0800   Manual Irrigation Volume Input (mL) 40 mL 11/19/23 0800   Output (mL) 200 mL 11/19/23 0941       Findings:       Electronically signed by Rasheed Rios MD on 12/21/2023 at 1:28 PM

## 2023-12-21 NOTE — CARE COORDINATION
12/21/23 Update CM Note: Patient is npo for a cysto with clot removal. He remains on iv rocephin qd/iv LR 63hr/and flomax. He was started on thiamine and folate. He is pending transfusion of one unit blood due to hgb 6.8/21.3. Mag level up to 1.7/K 3.4 today. Supplement given. Plan is for patient to discharge to home when stable.  Electronically signed by Foreign Spencer RN CM on 12/21/2023 at 10:45 AM

## 2023-12-22 VITALS
HEIGHT: 71 IN | SYSTOLIC BLOOD PRESSURE: 124 MMHG | HEART RATE: 53 BPM | WEIGHT: 166.3 LBS | BODY MASS INDEX: 23.28 KG/M2 | RESPIRATION RATE: 18 BRPM | OXYGEN SATURATION: 99 % | DIASTOLIC BLOOD PRESSURE: 78 MMHG | TEMPERATURE: 97.7 F

## 2023-12-22 LAB
ABO/RH: NORMAL
ALBUMIN SERPL-MCNC: 3.1 G/DL (ref 3.5–5.2)
ALP SERPL-CCNC: 144 U/L (ref 40–129)
ALT SERPL-CCNC: 11 U/L (ref 0–40)
ANION GAP SERPL CALCULATED.3IONS-SCNC: 13 MMOL/L (ref 7–16)
ANTIBODY SCREEN: NEGATIVE
ARM BAND NUMBER: NORMAL
AST SERPL-CCNC: 50 U/L (ref 0–39)
BASOPHILS # BLD: 0.02 K/UL (ref 0–0.2)
BASOPHILS NFR BLD: 0 % (ref 0–2)
BILIRUB SERPL-MCNC: 0.6 MG/DL (ref 0–1.2)
BLOOD BANK BLOOD PRODUCT EXPIRATION DATE: NORMAL
BLOOD BANK DISPENSE STATUS: NORMAL
BLOOD BANK ISBT PRODUCT BLOOD TYPE: 5100
BLOOD BANK PRODUCT CODE: NORMAL
BLOOD BANK SAMPLE EXPIRATION: NORMAL
BLOOD BANK UNIT TYPE AND RH: NORMAL
BPU ID: NORMAL
BUN SERPL-MCNC: 5 MG/DL (ref 6–23)
CALCIUM SERPL-MCNC: 8.5 MG/DL (ref 8.6–10.2)
CHLORIDE SERPL-SCNC: 105 MMOL/L (ref 98–107)
CO2 SERPL-SCNC: 20 MMOL/L (ref 22–29)
COMPONENT: NORMAL
CREAT SERPL-MCNC: 0.5 MG/DL (ref 0.7–1.2)
CROSSMATCH RESULT: NORMAL
EOSINOPHIL # BLD: 0.09 K/UL (ref 0.05–0.5)
EOSINOPHILS RELATIVE PERCENT: 2 % (ref 0–6)
ERYTHROCYTE [DISTWIDTH] IN BLOOD BY AUTOMATED COUNT: 15 % (ref 11.5–15)
GFR SERPL CREATININE-BSD FRML MDRD: >60 ML/MIN/1.73M2
GLUCOSE SERPL-MCNC: 85 MG/DL (ref 74–99)
HCT VFR BLD AUTO: 22.4 % (ref 37–54)
HCT VFR BLD AUTO: 24.2 % (ref 37–54)
HGB BLD-MCNC: 7.2 G/DL (ref 12.5–16.5)
HGB BLD-MCNC: 8.4 G/DL (ref 12.5–16.5)
IMM GRANULOCYTES # BLD AUTO: <0.03 K/UL (ref 0–0.58)
IMM GRANULOCYTES NFR BLD: 0 % (ref 0–5)
LYMPHOCYTES NFR BLD: 1.13 K/UL (ref 1.5–4)
LYMPHOCYTES RELATIVE PERCENT: 23 % (ref 20–42)
MCH RBC QN AUTO: 29.8 PG (ref 26–35)
MCHC RBC AUTO-ENTMCNC: 32.1 G/DL (ref 32–34.5)
MCV RBC AUTO: 92.6 FL (ref 80–99.9)
MONOCYTES NFR BLD: 0.48 K/UL (ref 0.1–0.95)
MONOCYTES NFR BLD: 10 % (ref 2–12)
NEUTROPHILS NFR BLD: 65 % (ref 43–80)
NEUTS SEG NFR BLD: 3.21 K/UL (ref 1.8–7.3)
PLATELET, FLUORESCENCE: 116 K/UL (ref 130–450)
PMV BLD AUTO: 11.7 FL (ref 7–12)
POTASSIUM SERPL-SCNC: 4 MMOL/L (ref 3.5–5)
PROT SERPL-MCNC: 6.3 G/DL (ref 6.4–8.3)
RBC # BLD AUTO: 2.42 M/UL (ref 3.8–5.8)
SODIUM SERPL-SCNC: 138 MMOL/L (ref 132–146)
TRANSFUSION STATUS: NORMAL
UNIT DIVISION: 0
UNIT ISSUE DATE/TIME: NORMAL
WBC OTHER # BLD: 5 K/UL (ref 4.5–11.5)

## 2023-12-22 PROCEDURE — 85025 COMPLETE CBC W/AUTO DIFF WBC: CPT

## 2023-12-22 PROCEDURE — 6360000002 HC RX W HCPCS: Performed by: UROLOGY

## 2023-12-22 PROCEDURE — 2580000003 HC RX 258: Performed by: UROLOGY

## 2023-12-22 PROCEDURE — 85014 HEMATOCRIT: CPT

## 2023-12-22 PROCEDURE — 85018 HEMOGLOBIN: CPT

## 2023-12-22 PROCEDURE — 6370000000 HC RX 637 (ALT 250 FOR IP): Performed by: STUDENT IN AN ORGANIZED HEALTH CARE EDUCATION/TRAINING PROGRAM

## 2023-12-22 PROCEDURE — 51798 US URINE CAPACITY MEASURE: CPT

## 2023-12-22 PROCEDURE — 6370000000 HC RX 637 (ALT 250 FOR IP): Performed by: UROLOGY

## 2023-12-22 PROCEDURE — 36415 COLL VENOUS BLD VENIPUNCTURE: CPT

## 2023-12-22 PROCEDURE — 30233N1 TRANSFUSION OF NONAUTOLOGOUS RED BLOOD CELLS INTO PERIPHERAL VEIN, PERCUTANEOUS APPROACH: ICD-10-PCS | Performed by: STUDENT IN AN ORGANIZED HEALTH CARE EDUCATION/TRAINING PROGRAM

## 2023-12-22 PROCEDURE — 80053 COMPREHEN METABOLIC PANEL: CPT

## 2023-12-22 RX ORDER — POTASSIUM CHLORIDE 20 MEQ/1
20 TABLET, EXTENDED RELEASE ORAL
Status: DISCONTINUED | OUTPATIENT
Start: 2023-12-22 | End: 2023-12-22 | Stop reason: HOSPADM

## 2023-12-22 RX ORDER — LACTOBACILLUS RHAMNOSUS GG 10B CELL
1 CAPSULE ORAL DAILY
Status: DISCONTINUED | OUTPATIENT
Start: 2023-12-22 | End: 2023-12-22 | Stop reason: HOSPADM

## 2023-12-22 RX ORDER — LACTOBACILLUS RHAMNOSUS GG 10B CELL
1 CAPSULE ORAL DAILY
Qty: 30 CAPSULE | Refills: 0 | Status: SHIPPED | OUTPATIENT
Start: 2023-12-23

## 2023-12-22 RX ORDER — OXYCODONE HYDROCHLORIDE 5 MG/1
5 TABLET ORAL EVERY 6 HOURS PRN
Qty: 12 TABLET | Refills: 0 | Status: SHIPPED | OUTPATIENT
Start: 2023-12-22 | End: 2023-12-25

## 2023-12-22 RX ORDER — LANOLIN ALCOHOL/MO/W.PET/CERES
400 CREAM (GRAM) TOPICAL DAILY
Status: DISCONTINUED | OUTPATIENT
Start: 2023-12-22 | End: 2023-12-22 | Stop reason: HOSPADM

## 2023-12-22 RX ADMIN — FUROSEMIDE 20 MG: 20 TABLET ORAL at 09:14

## 2023-12-22 RX ADMIN — WATER 1000 MG: 1 INJECTION INTRAMUSCULAR; INTRAVENOUS; SUBCUTANEOUS at 05:41

## 2023-12-22 RX ADMIN — Medication 400 MG: at 09:14

## 2023-12-22 RX ADMIN — Medication 1 CAPSULE: at 09:14

## 2023-12-22 RX ADMIN — Medication 100 MG: at 09:14

## 2023-12-22 RX ADMIN — FOLIC ACID 1 MG: 1 TABLET ORAL at 09:14

## 2023-12-22 RX ADMIN — SODIUM CHLORIDE, PRESERVATIVE FREE 10 ML: 5 INJECTION INTRAVENOUS at 09:14

## 2023-12-22 RX ADMIN — POTASSIUM CHLORIDE 20 MEQ: 1500 TABLET, EXTENDED RELEASE ORAL at 08:37

## 2023-12-22 RX ADMIN — TAMSULOSIN HYDROCHLORIDE 0.4 MG: 0.4 CAPSULE ORAL at 09:13

## 2023-12-22 RX ADMIN — METOPROLOL SUCCINATE 25 MG: 25 TABLET, EXTENDED RELEASE ORAL at 09:13

## 2023-12-22 NOTE — PROGRESS NOTES
Internal Medicine Progress Note    Patient's name: Tee Fernandes  : 1955  Chief complaints (on day of admission): Hematuria (Hematuria starting this morning. Patient admitted for this around Thanksgiving. Patient's wife states there are large amounts of clots in the urine.)  Admission date: 2023  Date of service: 2023   Room: [address]  Primary care physician: Heidi Greene MD  Reason for visit: Follow-up for hematuria     Subjective  Anaya Steele was seen and examined at bedside     Doing well   States urinating clear  Wants to go home   Hb down to 7.2   Will re check at 1 pm  If stable ok for dc   If not needs transfusion and over night monitoring   Talked to urology ok for DC per them     Review of Systems  There are no new complaints of chest pain, shortness of breath, abdominal pain, nausea, vomiting, diarrhea, constipation unless otherwise mentioned above.      Hospital Medications  Current Facility-Administered Medications   Medication Dose Route Frequency Provider Last Rate Last Admin    potassium chloride (KLOR-CON M) extended release tablet 20 mEq  20 mEq Oral Daily with breakfast Kristen Cotton MD   20 mEq at 23 0234    magnesium oxide (MAG-OX) tablet 400 mg  400 mg Oral Daily Kristen Cotton MD   400 mg at 23 1348    lactobacillus (CULTURELLE) capsule 1 capsule  1 capsule Oral Daily Kristen Cotton MD   1 capsule at 23 1106    oxyCODONE (ROXICODONE) immediate release tablet 5 mg  5 mg Oral Q6H PRN Kristen Cotton MD   5 mg at 23 2048    furosemide (LASIX) tablet 20 mg  20 mg Oral Daily Alverto Mcdonald MD   20 mg at 23 0914    gabapentin (NEURONTIN) capsule 100 mg  100 mg Oral Q12H PRN Alverto Mcdonald MD        [Held by provider] losartan (COZAAR) tablet 100 mg  100 mg Oral Daily Alverto Mcdonald MD        metoprolol succinate (TOPROL XL) extended release tablet 25 mg  25 mg Oral Daily Alverto Mcdonald MD   25 mg at 23 1636 CO2 20 (L) 12/22/2023    GLUCOSE 85 12/22/2023    ALT 11 12/22/2023    AST 50 (H) 12/22/2023    INR 1.1 12/18/2023    APTT 35.0 12/18/2023    LABA1C 4.9 11/02/2023       FLUORO FOR SURGICAL PROCEDURES   Final Result   Intraprocedural fluoroscopic spot images as above. See separate procedure   report for more information. CT ABDOMEN PELVIS WO CONTRAST Additional Contrast? None   Final Result   Layering hyperattenuating material within the bladder, possibly blood   products. No radiopaque renal stone. No hydronephrosis. Echocardiogram       Assessment   Active Hospital Problems    Diagnosis     Coronary artery disease involving native coronary artery of native heart without angina pectoris [I25.10]     Sinus tachycardia [R00.0]     Tobacco abuse [Z72.0]     Hematuria [R31.9]     History of prostate cancer [Z85.46]     Alcohol abuse [F10.10]     Paroxysmal atrial fibrillation (HCC) [I48.0]          Plan  Hematuria with cystitis   CT showing blood in bladder  Urology consult   Rocephin   UA +  Follow urine culture   Continue IVFs   Spoke with urology, cysto on Thursday     Anemia requiring transfusion associated to the above  Transfuse 1 unit prbc today  Check post transfusion HH     Alcohol use / hx of abuse   Peer recovery to speak with patient   Ciwa ordered   Talked to patient at length     PT/OT  Home medications to be reconciled and confirmed prior to being ordered  DVT prophylaxis   Code Status Full   Discharge plan: home today if repeat [facility] at 1 pm is stable, cleared by urology for DC     Electronically signed by Kaitlin Dior MD on 12/22/2023 at 12:29 PM    I can be reached through [facility].

## 2023-12-22 NOTE — OP NOTE
[address], [address] [place] [address]                                OPERATIVE REPORT    PATIENT NAME: Franki Abbott                       :        1955  MED REC NO:   [id number]                            ROOM:       [id number]  ACCOUNT NO:   [de-identified]                           ADMIT DATE: 2023  PROVIDER:     Coty Hebert MD    DATE OF PROCEDURE:  2023    PREOPERATIVE DIAGNOSIS:  Gross hematuria. 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 OPERATION:  Cystopanendoscopy, retrograde pyelogram, fulguration of  bladder bleeding. SURGEON:  Coty Hebert MD    ANESTHESIA:  LMAC. ESTIMATED BLOOD LOSS:  Less than 50 mL. OPERATION REPORT:  With the patient in the lithotomy position under  satisfactory sedation, the genitalia were prepped and draped in a  sterile manner. A 21-Tamazight panendoscope was passed through his  pendulous and membranous urethra. There were no strictures or lesions  seen. The prostatic fossa showed development with no significant outlet  obstruction. Upon entering the bladder, the bladder showed a pattern  that was very classic for radiation cystitis with arborization of large  blood vessels scattered throughout the bladder. There were no lesions  seen at all in the bladder other than the abnormal blood vessel pattern. The trigone was symmetrical.  The ureteral orifices of normal  configuration and location. Retrograde pyelogram confirmed normal upper  tracts with satisfactory drainage. Following retrograde, some areas of  the bladder where the blood vessels appear to be extremely large and  dilated were cauterized using a Bugbee electrode. The patient's bladder  was drained with Anaya catheter, and he was sent to the recovery room in  satisfactory condition.         Coty Hebert MD    D: 2023 13:42:29       T: 2023 13:45:37

## 2023-12-22 NOTE — DISCHARGE INSTRUCTIONS
You may see some blood in urine  Call the office or go to the ED if any difficulty urinating or severe blood in the urine ([facility] [phone])   [facility] to shower  Office will call you to schedule follow up     Please call your PCP, Dr. Briseida Shaikh for a follow up appointment within a week after discharge.

## 2023-12-22 NOTE — PROGRESS NOTES
I called [facility] regarding his scripts. He has a $10 copay. He told pharmacy he would  these prescriptions tomorrow.

## 2023-12-22 NOTE — CARE COORDINATION
12/22/23 Update CM Note: Patient is POD 1 Cysto/retro with clot evacuation. He is cleared for discharge per urology. Anaya is out. Flomax is ordered. He will need a bladder scan before discharge. He is pending urine cytology and repeat h/h today. He did receive one unit blood yesterday due to 6.8/21. 3. Post blood 8.1/25. 8. he remains on iv rocephin qd and iv flds. His plan is to return home with his wife at discharge.  Electronically signed by Lizz Kumar RN CM on 12/22/2023 at 10:18 AM

## 2023-12-22 NOTE — PROGRESS NOTES
Sent Dr. Guanakito Walter a message. Hgb is 8.4 now. [address] [facility] for discharge?

## 2023-12-26 LAB — NON-GYN CYTOLOGY REPORT: NORMAL

## 2023-12-26 NOTE — DISCHARGE SUMMARY
Internal Medicine Discharge Summary    NAME: Chris Tucker :  1955  MRN:  [id number] Edil Moore MD    ADMITTED: 2023   DISCHARGED: 2023  4:55 PM    ADMITTING PHYSICIAN: No att. providers found    PCP: Vida Gomes MD    CONSULTANT(S):   IP CONSULT TO INTERNAL MEDICINE  IP CONSULT TO UROLOGY  IP CONSULT TO SOCIAL WORK     ADMITTING DIAGNOSIS:   Hypokalemia [E87.6]  Hypomagnesemia [E83.42]  Hematuria [R31.9]  Hematuria, unspecified type [R31.9]     Please see H&P for further details    DISCHARGE DIAGNOSES:   Active Hospital Problems    Diagnosis     Coronary artery disease involving native coronary artery of native heart without angina pectoris [I25.10]     Sinus tachycardia [R00.0]     Tobacco abuse [Z72.0]     Hematuria [R31.9]     History of prostate cancer [Z85.46]     Alcohol abuse [F10.10]     Paroxysmal atrial fibrillation ([address]) [I48.0]        BRIEF HISTORY OF PRESENT ILLNESS: Chris Tucker is a 80 y.o. male patient of Vida Gomes MD who  has no past medical history on file. who originally had concerns including Hematuria (Hematuria starting this morning. Patient admitted for this around Thanksgiving. Patient's wife states there are large amounts of clots in the urine. ). at presentation on 2023, and was found to have Hypokalemia [E87.6]  Hypomagnesemia [E83.42]  Hematuria [R31.9]  Hematuria, unspecified type [R31.9] after workup. Please see H&P for further details. HOSPITAL COURSE:   The patient presented to the hospital with the chief complaint of Hematuria (Hematuria starting this morning. Patient admitted for this around Thanksgiving. Patient's wife states there are large amounts of clots in the urine.)  .  The patient was admitted to the hospital.     Hematuria with cystitis   CT showing blood in bladder  Urology consult   Rocephin   UA +  Follow urine culture   Continue IVFs   Spoke with urology, cysto on Thursday complete showing radiation cystitis is hyperattenuating material layering within the bladder.  The prostate is not enlarged.  Penile prosthetic reservoir noted in the right pelvis. Peritoneum/Retroperitoneum: Trace presacral soft tissue stranding/fluid.  No extraluminal gas.  No evidence of lymphadenopathy.  Aorta is normal in caliber. Bones/Soft Tissues:  No acute abnormality of the visualized osseous structures.  There is multilevel degenerative disc disease.     Layering hyperattenuating material within the bladder, possibly blood products.  No radiopaque renal stone.  No hydronephrosis.       MICROBIOLOGY:  BLOOD CX #1  No results for input(s): \"BC\" in the last 72 hours.  BLOOD CX #2  No results for input(s): \"BLOODCULT2\" in the last 72 hours.  TIP CULTURE  No results for input(s): \"CXCATHTIP\" in the last 72 hours.   CULTURE, RESPIRATORY   No results for input(s): \"CULTRESP\" in the last 72 hours.  RESPIRATORY SMEAR  No results for input(s): \"RESPSMEAR\" in the last 72 hours.       ECHO:      DISPOSITION:  The patient's condition is fair.   The patient is being discharged to home    DISCHARGE MEDICATIONS:      Medication List        START taking these medications      lactobacillus Caps capsule  Take 1 capsule by mouth daily     oxyCODONE 5 MG immediate release tablet  Commonly known as: ROXICODONE  Take 1 tablet by mouth every 6 hours as needed for Pain for up to 3 days. Max Daily Amount: 20 mg            CONTINUE taking these medications      folic acid 1 MG tablet  Commonly known as: FOLVITE     furosemide 20 MG tablet  Commonly known as: Lasix  Take 1 tablet by mouth daily     gabapentin 100 MG capsule  Commonly known as: NEURONTIN     losartan 100 MG tablet  Commonly known as: COZAAR     metoprolol succinate 25 MG extended release tablet  Commonly known as: TOPROL XL  Take 1 tablet by mouth daily     tamsulosin 0.4 MG capsule  Commonly known as: FLOMAX     vitamin B-1 100 MG tablet  Commonly known as: THIAMINE               Where to Get Your

## 2023-12-28 NOTE — PROGRESS NOTES
Physician Progress Note      PATIENT:               Jos Olson  CSN #:                  [id number]  :                       1955  ADMIT DATE:       2023 1:10 AM  DISCH DATE:        2023 4:55 PM  RESPONDING  PROVIDER #:        Rustam Lujan MD          QUERY TEXT:    Pt admitted with radiation cystitis with hematuria and has anemia documented. If possible, please document in progress notes and discharge summary further   specificity regarding the acuity and type of anemia:    The medical record reflects the following:  Risk Factors: Prostate cancer in medical history  Clinical Indicators: Per Discharge summary: \". ..presented to the hospital with   the chief complaint of Hematuria. Yin Spencer Hematuria with cystitis CT showing blood   in bladder. Yin Spencer Spoke with urology, cysto on Thursday complete showing radiation   cystitis chronic they will follow as an op. Yin Spencer Anemia requiring transfusion   associated to the above. Yin Wells Yin Wells \"  Treatment: To OR for control of bleeding on , PRBC transfusion, serial   labs    Thank you,  Ayde LIRIANON, RN, CRCR  Clinical Documentation Improvement  [email or web address]  Options provided:  -- Anemia due to acute blood loss  -- Other - I will add my own diagnosis  -- Disagree - Not applicable / Not valid  -- Disagree - Clinically unable to determine / Unknown  -- Refer to Clinical Documentation Reviewer    PROVIDER RESPONSE TEXT:    This patient has acute blood loss anemia.     Query created by: Mary Lopez on 2023 6:28 AM      Electronically signed by:  Rustam Lujan MD 2023 1:03 PM

## 2024-04-16 ENCOUNTER — APPOINTMENT (OUTPATIENT)
Dept: CT IMAGING | Age: 69
DRG: 812 | End: 2024-04-16
Payer: MEDICARE

## 2024-04-16 ENCOUNTER — APPOINTMENT (OUTPATIENT)
Dept: GENERAL RADIOLOGY | Age: 69
DRG: 812 | End: 2024-04-16
Payer: MEDICARE

## 2024-04-16 ENCOUNTER — HOSPITAL ENCOUNTER (INPATIENT)
Age: 69
LOS: 2 days | Discharge: HOME | DRG: 812 | End: 2024-04-20
Attending: STUDENT IN AN ORGANIZED HEALTH CARE EDUCATION/TRAINING PROGRAM | Admitting: FAMILY MEDICINE
Payer: MEDICARE

## 2024-04-16 DIAGNOSIS — R07.9 CHEST PAIN, UNSPECIFIED TYPE: Primary | ICD-10-CM

## 2024-04-16 DIAGNOSIS — I48.91 ATRIAL FIBRILLATION WITH RAPID VENTRICULAR RESPONSE (HCC): ICD-10-CM

## 2024-04-16 DIAGNOSIS — I48.91 ATRIAL FIBRILLATION, UNSPECIFIED TYPE (HCC): ICD-10-CM

## 2024-04-16 DIAGNOSIS — I48.0 PAROXYSMAL ATRIAL FIBRILLATION (HCC): ICD-10-CM

## 2024-04-16 PROBLEM — R06.09 DYSPNEA ON EXERTION: Status: ACTIVE | Noted: 2024-04-16

## 2024-04-16 LAB
ALBUMIN SERPL-MCNC: 4.5 G/DL (ref 3.5–5.2)
ALP SERPL-CCNC: 182 U/L (ref 40–129)
ALT SERPL-CCNC: 8 U/L (ref 0–40)
ANION GAP SERPL CALCULATED.3IONS-SCNC: 17 MMOL/L (ref 7–16)
AST SERPL-CCNC: 24 U/L (ref 0–39)
B PARAP IS1001 DNA NPH QL NAA+NON-PROBE: NOT DETECTED
B PERT DNA SPEC QL NAA+PROBE: NOT DETECTED
BACTERIA URNS QL MICRO: ABNORMAL
BASOPHILS # BLD: 0.03 K/UL (ref 0–0.2)
BASOPHILS NFR BLD: 0 % (ref 0–2)
BILIRUB SERPL-MCNC: 2.5 MG/DL (ref 0–1.2)
BILIRUB UR QL STRIP: ABNORMAL
BNP SERPL-MCNC: 241 PG/ML (ref 0–125)
BUN SERPL-MCNC: 47 MG/DL (ref 6–23)
C PNEUM DNA NPH QL NAA+NON-PROBE: NOT DETECTED
CALCIUM SERPL-MCNC: 10.7 MG/DL (ref 8.6–10.2)
CASTS #/AREA URNS LPF: ABNORMAL /LPF
CHLORIDE SERPL-SCNC: 97 MMOL/L (ref 98–107)
CLARITY UR: CLEAR
CO2 SERPL-SCNC: 23 MMOL/L (ref 22–29)
COLOR UR: YELLOW
CREAT SERPL-MCNC: 1 MG/DL (ref 0.7–1.2)
EKG ATRIAL RATE: 129 BPM
EKG Q-T INTERVAL: 322 MS
EKG QRS DURATION: 74 MS
EKG QTC CALCULATION (BAZETT): 452 MS
EKG R AXIS: -33 DEGREES
EKG T AXIS: 174 DEGREES
EKG VENTRICULAR RATE: 119 BPM
EOSINOPHIL # BLD: 0 K/UL (ref 0.05–0.5)
EOSINOPHILS RELATIVE PERCENT: 0 % (ref 0–6)
EPI CELLS #/AREA URNS HPF: ABNORMAL /HPF
ERYTHROCYTE [DISTWIDTH] IN BLOOD BY AUTOMATED COUNT: 13.3 % (ref 11.5–15)
FLUAV RNA NPH QL NAA+NON-PROBE: NOT DETECTED
FLUBV RNA NPH QL NAA+NON-PROBE: NOT DETECTED
GFR SERPL CREATININE-BSD FRML MDRD: 88 ML/MIN/1.73M2
GLUCOSE SERPL-MCNC: 135 MG/DL (ref 74–99)
GLUCOSE UR STRIP-MCNC: 100 MG/DL
HADV DNA NPH QL NAA+NON-PROBE: NOT DETECTED
HCOV 229E RNA NPH QL NAA+NON-PROBE: NOT DETECTED
HCOV HKU1 RNA NPH QL NAA+NON-PROBE: NOT DETECTED
HCOV NL63 RNA NPH QL NAA+NON-PROBE: NOT DETECTED
HCOV OC43 RNA NPH QL NAA+NON-PROBE: NOT DETECTED
HCT VFR BLD AUTO: 28.9 % (ref 37–54)
HGB BLD-MCNC: 9.2 G/DL (ref 12.5–16.5)
HGB UR QL STRIP.AUTO: ABNORMAL
HMPV RNA NPH QL NAA+NON-PROBE: NOT DETECTED
HPIV1 RNA NPH QL NAA+NON-PROBE: NOT DETECTED
HPIV2 RNA NPH QL NAA+NON-PROBE: NOT DETECTED
HPIV3 RNA NPH QL NAA+NON-PROBE: NOT DETECTED
HPIV4 RNA NPH QL NAA+NON-PROBE: NOT DETECTED
IMM GRANULOCYTES # BLD AUTO: 0.04 K/UL (ref 0–0.58)
IMM GRANULOCYTES NFR BLD: 1 % (ref 0–5)
KETONES UR STRIP-MCNC: ABNORMAL MG/DL
LACTATE BLDV-SCNC: 1.5 MMOL/L (ref 0.5–2.2)
LACTATE BLDV-SCNC: 2.3 MMOL/L (ref 0.5–2.2)
LEUKOCYTE ESTERASE UR QL STRIP: ABNORMAL
LIPASE SERPL-CCNC: 13 U/L (ref 13–60)
LYMPHOCYTES NFR BLD: 0.75 K/UL (ref 1.5–4)
LYMPHOCYTES RELATIVE PERCENT: 11 % (ref 20–42)
M PNEUMO DNA NPH QL NAA+NON-PROBE: NOT DETECTED
MAGNESIUM SERPL-MCNC: 1.3 MG/DL (ref 1.6–2.6)
MCH RBC QN AUTO: 28.9 PG (ref 26–35)
MCHC RBC AUTO-ENTMCNC: 31.8 G/DL (ref 32–34.5)
MCV RBC AUTO: 90.9 FL (ref 80–99.9)
MONOCYTES NFR BLD: 0.46 K/UL (ref 0.1–0.95)
MONOCYTES NFR BLD: 7 % (ref 2–12)
NEUTROPHILS NFR BLD: 81 % (ref 43–80)
NEUTS SEG NFR BLD: 5.57 K/UL (ref 1.8–7.3)
NITRITE UR QL STRIP: NEGATIVE
PH UR STRIP: 6 [PH] (ref 5–9)
PLATELET # BLD AUTO: 95 K/UL (ref 130–450)
PLATELET CONFIRMATION: NORMAL
PMV BLD AUTO: 12.7 FL (ref 7–12)
POTASSIUM SERPL-SCNC: 3.4 MMOL/L (ref 3.5–5)
PROT SERPL-MCNC: 8.4 G/DL (ref 6.4–8.3)
PROT UR STRIP-MCNC: 30 MG/DL
RBC # BLD AUTO: 3.18 M/UL (ref 3.8–5.8)
RBC #/AREA URNS HPF: ABNORMAL /HPF
RSV RNA NPH QL NAA+NON-PROBE: NOT DETECTED
RV+EV RNA NPH QL NAA+NON-PROBE: NOT DETECTED
SARS-COV-2 RNA NPH QL NAA+NON-PROBE: NOT DETECTED
SODIUM SERPL-SCNC: 137 MMOL/L (ref 132–146)
SP GR UR STRIP: 1.01 (ref 1–1.03)
SPECIMEN DESCRIPTION: NORMAL
TROPONIN I SERPL HS-MCNC: 18 NG/L (ref 0–11)
TROPONIN I SERPL HS-MCNC: 18 NG/L (ref 0–11)
UROBILINOGEN UR STRIP-ACNC: >8 EU/DL (ref 0–1)
WBC #/AREA URNS HPF: ABNORMAL /HPF
WBC OTHER # BLD: 6.9 K/UL (ref 4.5–11.5)

## 2024-04-16 PROCEDURE — 6360000004 HC RX CONTRAST MEDICATION: Performed by: RADIOLOGY

## 2024-04-16 PROCEDURE — 83880 ASSAY OF NATRIURETIC PEPTIDE: CPT

## 2024-04-16 PROCEDURE — 96366 THER/PROPH/DIAG IV INF ADDON: CPT

## 2024-04-16 PROCEDURE — 80053 COMPREHEN METABOLIC PANEL: CPT

## 2024-04-16 PROCEDURE — 84484 ASSAY OF TROPONIN QUANT: CPT

## 2024-04-16 PROCEDURE — 85025 COMPLETE CBC W/AUTO DIFF WBC: CPT

## 2024-04-16 PROCEDURE — 6370000000 HC RX 637 (ALT 250 FOR IP): Performed by: FAMILY MEDICINE

## 2024-04-16 PROCEDURE — 81001 URINALYSIS AUTO W/SCOPE: CPT

## 2024-04-16 PROCEDURE — 99222 1ST HOSP IP/OBS MODERATE 55: CPT | Performed by: FAMILY MEDICINE

## 2024-04-16 PROCEDURE — 74177 CT ABD & PELVIS W/CONTRAST: CPT

## 2024-04-16 PROCEDURE — 71045 X-RAY EXAM CHEST 1 VIEW: CPT

## 2024-04-16 PROCEDURE — 6360000002 HC RX W HCPCS: Performed by: STUDENT IN AN ORGANIZED HEALTH CARE EDUCATION/TRAINING PROGRAM

## 2024-04-16 PROCEDURE — 93010 ELECTROCARDIOGRAM REPORT: CPT | Performed by: INTERNAL MEDICINE

## 2024-04-16 PROCEDURE — 0202U NFCT DS 22 TRGT SARS-COV-2: CPT

## 2024-04-16 PROCEDURE — 83735 ASSAY OF MAGNESIUM: CPT

## 2024-04-16 PROCEDURE — G0378 HOSPITAL OBSERVATION PER HR: HCPCS

## 2024-04-16 PROCEDURE — 2580000003 HC RX 258: Performed by: EMERGENCY MEDICINE

## 2024-04-16 PROCEDURE — 6370000000 HC RX 637 (ALT 250 FOR IP): Performed by: STUDENT IN AN ORGANIZED HEALTH CARE EDUCATION/TRAINING PROGRAM

## 2024-04-16 PROCEDURE — 99285 EMERGENCY DEPT VISIT HI MDM: CPT

## 2024-04-16 PROCEDURE — 93005 ELECTROCARDIOGRAM TRACING: CPT | Performed by: EMERGENCY MEDICINE

## 2024-04-16 PROCEDURE — 96365 THER/PROPH/DIAG IV INF INIT: CPT

## 2024-04-16 PROCEDURE — 96361 HYDRATE IV INFUSION ADD-ON: CPT

## 2024-04-16 PROCEDURE — 36415 COLL VENOUS BLD VENIPUNCTURE: CPT

## 2024-04-16 PROCEDURE — 71275 CT ANGIOGRAPHY CHEST: CPT

## 2024-04-16 PROCEDURE — 83605 ASSAY OF LACTIC ACID: CPT

## 2024-04-16 PROCEDURE — 83690 ASSAY OF LIPASE: CPT

## 2024-04-16 RX ORDER — POLYETHYLENE GLYCOL 3350 17 G/17G
17 POWDER, FOR SOLUTION ORAL DAILY PRN
Status: DISCONTINUED | OUTPATIENT
Start: 2024-04-16 | End: 2024-04-20 | Stop reason: HOSPADM

## 2024-04-16 RX ORDER — SODIUM CHLORIDE 0.9 % (FLUSH) 0.9 %
5-40 SYRINGE (ML) INJECTION PRN
Status: DISCONTINUED | OUTPATIENT
Start: 2024-04-16 | End: 2024-04-20 | Stop reason: HOSPADM

## 2024-04-16 RX ORDER — FOLIC ACID 1 MG/1
1 TABLET ORAL DAILY
Status: DISCONTINUED | OUTPATIENT
Start: 2024-04-16 | End: 2024-04-20 | Stop reason: HOSPADM

## 2024-04-16 RX ORDER — REGADENOSON 0.08 MG/ML
0.4 INJECTION, SOLUTION INTRAVENOUS
Status: DISCONTINUED | OUTPATIENT
Start: 2024-04-16 | End: 2024-04-17

## 2024-04-16 RX ORDER — MAGNESIUM SULFATE IN WATER 40 MG/ML
2000 INJECTION, SOLUTION INTRAVENOUS PRN
Status: DISCONTINUED | OUTPATIENT
Start: 2024-04-16 | End: 2024-04-20 | Stop reason: HOSPADM

## 2024-04-16 RX ORDER — POTASSIUM CHLORIDE 7.45 MG/ML
10 INJECTION INTRAVENOUS PRN
Status: DISCONTINUED | OUTPATIENT
Start: 2024-04-16 | End: 2024-04-17

## 2024-04-16 RX ORDER — LANOLIN ALCOHOL/MO/W.PET/CERES
100 CREAM (GRAM) TOPICAL DAILY
Status: DISCONTINUED | OUTPATIENT
Start: 2024-04-16 | End: 2024-04-20 | Stop reason: HOSPADM

## 2024-04-16 RX ORDER — ACETAMINOPHEN 325 MG/1
650 TABLET ORAL EVERY 6 HOURS PRN
Status: DISCONTINUED | OUTPATIENT
Start: 2024-04-16 | End: 2024-04-20 | Stop reason: HOSPADM

## 2024-04-16 RX ORDER — SODIUM CHLORIDE 0.9 % (FLUSH) 0.9 %
5-40 SYRINGE (ML) INJECTION EVERY 12 HOURS SCHEDULED
Status: DISCONTINUED | OUTPATIENT
Start: 2024-04-16 | End: 2024-04-20 | Stop reason: HOSPADM

## 2024-04-16 RX ORDER — LACTOBACILLUS RHAMNOSUS GG 10B CELL
1 CAPSULE ORAL DAILY
Status: DISCONTINUED | OUTPATIENT
Start: 2024-04-16 | End: 2024-04-20 | Stop reason: HOSPADM

## 2024-04-16 RX ORDER — ONDANSETRON 4 MG/1
4 TABLET, ORALLY DISINTEGRATING ORAL EVERY 8 HOURS PRN
Status: DISCONTINUED | OUTPATIENT
Start: 2024-04-16 | End: 2024-04-20 | Stop reason: HOSPADM

## 2024-04-16 RX ORDER — FUROSEMIDE 20 MG/1
20 TABLET ORAL DAILY
Status: DISCONTINUED | OUTPATIENT
Start: 2024-04-16 | End: 2024-04-20 | Stop reason: HOSPADM

## 2024-04-16 RX ORDER — POTASSIUM CHLORIDE 20 MEQ/1
40 TABLET, EXTENDED RELEASE ORAL PRN
Status: DISCONTINUED | OUTPATIENT
Start: 2024-04-16 | End: 2024-04-17

## 2024-04-16 RX ORDER — TAMSULOSIN HYDROCHLORIDE 0.4 MG/1
0.4 CAPSULE ORAL DAILY
Status: DISCONTINUED | OUTPATIENT
Start: 2024-04-16 | End: 2024-04-20 | Stop reason: HOSPADM

## 2024-04-16 RX ORDER — LANOLIN ALCOHOL/MO/W.PET/CERES
3 CREAM (GRAM) TOPICAL NIGHTLY
Status: DISCONTINUED | OUTPATIENT
Start: 2024-04-16 | End: 2024-04-20 | Stop reason: HOSPADM

## 2024-04-16 RX ORDER — 0.9 % SODIUM CHLORIDE 0.9 %
1000 INTRAVENOUS SOLUTION INTRAVENOUS ONCE
Status: COMPLETED | OUTPATIENT
Start: 2024-04-16 | End: 2024-04-16

## 2024-04-16 RX ORDER — ONDANSETRON 2 MG/ML
4 INJECTION INTRAMUSCULAR; INTRAVENOUS EVERY 6 HOURS PRN
Status: DISCONTINUED | OUTPATIENT
Start: 2024-04-16 | End: 2024-04-20 | Stop reason: HOSPADM

## 2024-04-16 RX ORDER — SODIUM CHLORIDE 9 MG/ML
INJECTION, SOLUTION INTRAVENOUS PRN
Status: DISCONTINUED | OUTPATIENT
Start: 2024-04-16 | End: 2024-04-20 | Stop reason: HOSPADM

## 2024-04-16 RX ORDER — LOSARTAN POTASSIUM 50 MG/1
100 TABLET ORAL DAILY
Status: DISCONTINUED | OUTPATIENT
Start: 2024-04-16 | End: 2024-04-20 | Stop reason: HOSPADM

## 2024-04-16 RX ORDER — METOPROLOL SUCCINATE 25 MG/1
25 TABLET, EXTENDED RELEASE ORAL DAILY
Status: DISCONTINUED | OUTPATIENT
Start: 2024-04-16 | End: 2024-04-17

## 2024-04-16 RX ORDER — MAGNESIUM SULFATE IN WATER 40 MG/ML
2000 INJECTION, SOLUTION INTRAVENOUS ONCE
Status: COMPLETED | OUTPATIENT
Start: 2024-04-16 | End: 2024-04-16

## 2024-04-16 RX ORDER — ACETAMINOPHEN 650 MG/1
650 SUPPOSITORY RECTAL EVERY 6 HOURS PRN
Status: DISCONTINUED | OUTPATIENT
Start: 2024-04-16 | End: 2024-04-20 | Stop reason: HOSPADM

## 2024-04-16 RX ORDER — GABAPENTIN 100 MG/1
100 CAPSULE ORAL EVERY 12 HOURS PRN
Status: DISCONTINUED | OUTPATIENT
Start: 2024-04-16 | End: 2024-04-20 | Stop reason: HOSPADM

## 2024-04-16 RX ADMIN — SODIUM CHLORIDE 1000 ML: 9 INJECTION, SOLUTION INTRAVENOUS at 12:31

## 2024-04-16 RX ADMIN — POTASSIUM BICARBONATE 40 MEQ: 782 TABLET, EFFERVESCENT ORAL at 14:03

## 2024-04-16 RX ADMIN — MAGNESIUM SULFATE HEPTAHYDRATE 2000 MG: 40 INJECTION, SOLUTION INTRAVENOUS at 14:08

## 2024-04-16 RX ADMIN — METOPROLOL SUCCINATE 25 MG: 25 TABLET, EXTENDED RELEASE ORAL at 16:38

## 2024-04-16 RX ADMIN — IOPAMIDOL 75 ML: 755 INJECTION, SOLUTION INTRAVENOUS at 14:49

## 2024-04-16 ASSESSMENT — LIFESTYLE VARIABLES
HOW OFTEN DO YOU HAVE A DRINK CONTAINING ALCOHOL: NEVER
HOW OFTEN DO YOU HAVE A DRINK CONTAINING ALCOHOL: NEVER

## 2024-04-16 NOTE — ED PROVIDER NOTES
capsule by mouth daily    LOSARTAN (COZAAR) 100 MG TABLET    Take 1 tablet by mouth daily    METOPROLOL SUCCINATE (TOPROL XL) 25 MG EXTENDED RELEASE TABLET    Take 1 tablet by mouth daily    TAMSULOSIN (FLOMAX) 0.4 MG CAPSULE    Take 1 capsule by mouth daily    VITAMIN B-1 (THIAMINE) 100 MG TABLET    Take 1 tablet by mouth daily       ALLERGIES     Patient has no known allergies.    FAMILYHISTORY     No family history on file.     SOCIAL HISTORY       Social History     Tobacco Use    Smoking status: Every Day     Current packs/day: 0.50     Average packs/day: 0.5 packs/day for 53.5 years (26.7 ttl pk-yrs)     Types: Cigarettes     Start date: 10/31/1970    Smokeless tobacco: Never   Vaping Use    Vaping Use: Never used   Substance Use Topics    Alcohol use: Yes    Drug use: Never       SCREENINGS        [place] Coma Scale  Eye Opening: Spontaneous  Best Verbal Response: Oriented  Best Motor Response: Obeys commands  [place] Coma Scale Score: 15                CIWA Assessment  BP: 107/76  Pulse: 75           PHYSICAL EXAM  1 or more Elements     ED Triage Vitals   BP Temp Temp Source Pulse Respirations SpO2 Height Weight - Scale   04/16/24 1120 04/16/24 1106 04/16/24 1106 04/16/24 1106 04/16/24 1106 04/16/24 1106 04/16/24 1122 04/16/24 1122   116/84 97.4 °F (36.3 °C) Oral 80 18 97 % 1.778 m (5' 10\") 79.8 kg (176 lb)       Constitutional/General: Alert and oriented x3  Head: Normocephalic and atraumatic  Eyes: PERRL, EOMI, conjunctiva normal, sclera non icteric  ENT:  Oropharynx clear, handling secretions  Neck: Supple, full ROM, no stridor  Respiratory: Lungs clear to auscultation bilaterally, no wheezes, rales, or rhonchi. Not in respiratory distress  Cardiovascular:  Regular tachycardic rate. Regular rhythm. 2+ distal pulses. Equal extremity pulses.   Chest: No chest wall tenderness  GI:  Abdomen Soft, Non tender, Non distended.  No rebound, guarding, or rigidity.   Musculoskeletal: Moves all extremities x 4. Warm

## 2024-04-16 NOTE — H&P
Hospital Medicine History & Physical      PCP: Daniel Cuello MD    Date of Admission: 4/16/2024    Date of Service: Pt seen/examined on 4/16/24 and Admitted to Inpatient with expected LOS greater than two midnights due to medical therapy and Placed in Observation.    Chief Complaint:  chest pain      History Of Present Illness:    68-year-old male past medical history of alcohol abuse, hematuria, paroxymal atrial fibrillation, prostate cancer, GI bleed requiring outpatient capsule endoscopy, coronary artery disease presents ED due to chest pain.  He was found to be in atrial fibrillation with RVR on presentation with heart rate of 122.  CT angiogram of the chest was negative for pulm embolism.  Hemoglobin 9.2.  Respiratory panel was negative.  Urinalysis with no evidence of UTI, 6-9 RBCs and moderate hemoglobin present.  Chest x-ray negative for any acute findings.  Potassium of 3.4 and magnesium of 1.3.  Troponin of 18.  He was given IV fluids and oral potassium and IV magnesium replacement.  Heart rate did improve.Patient denies any problems breathing when he lays flat just mentions to have chest pain off and on and significant dyspnea on exertion.  He did mention that he had the endoscopy as recommended and he was told by the GI doctor that there was no concerns.    Past Medical History:      No past medical history on file.    Past Surgical History:          Procedure Laterality Date    CYSTOSCOPY N/A 12/21/2023    CYSTOSCOPY BILATERAL RETROGRADE PYELOGRAM CLOT EVACUATION, FULGURATION BLADDER, insertion of arboleda catheter performed [place]cdonald [place] [facility] [place]       Medications Prior to Admission:      Prior to Admission medications    Medication Sig Start Date End Date Taking? Authorizing Provider   lactobacillus (CULTURELLE) CAPS capsule Take 1 capsule by mouth daily 12/23/23   Trever Anderson MD   furosemide (LASIX) 20 MG tablet Take 1 tablet by mouth daily 11/20/23   Trever Anderson MD

## 2024-04-17 ENCOUNTER — APPOINTMENT (OUTPATIENT)
Age: 69
DRG: 812 | End: 2024-04-17
Attending: FAMILY MEDICINE
Payer: MEDICARE

## 2024-04-17 PROBLEM — I35.1 NONRHEUMATIC AORTIC VALVE INSUFFICIENCY: Status: ACTIVE | Noted: 2024-04-17

## 2024-04-17 PROBLEM — I34.0 NONRHEUMATIC MITRAL VALVE REGURGITATION: Status: ACTIVE | Noted: 2024-04-17

## 2024-04-17 LAB
ANION GAP SERPL CALCULATED.3IONS-SCNC: 18 MMOL/L (ref 7–16)
BASOPHILS # BLD: 0 K/UL (ref 0–0.2)
BASOPHILS NFR BLD: 0 % (ref 0–2)
BUN SERPL-MCNC: 43 MG/DL (ref 6–23)
CALCIUM SERPL-MCNC: 9.7 MG/DL (ref 8.6–10.2)
CHLORIDE SERPL-SCNC: 100 MMOL/L (ref 98–107)
CO2 SERPL-SCNC: 21 MMOL/L (ref 22–29)
CREAT SERPL-MCNC: 0.9 MG/DL (ref 0.7–1.2)
ECHO AO ASC DIAM: 3.6 CM
ECHO AO ASCENDING AORTA INDEX: 1.82 CM/M2
ECHO AR MAX VEL PISA: 3.7 M/S
ECHO AV AREA PEAK VELOCITY: 2.6 CM2
ECHO AV AREA VTI: 2.8 CM2
ECHO AV AREA/BSA PEAK VELOCITY: 1.3 CM2/M2
ECHO AV AREA/BSA VTI: 1.4 CM2/M2
ECHO AV CUSP MM: 1.8 CM
ECHO AV MEAN GRADIENT: 8 MMHG
ECHO AV MEAN VELOCITY: 1.3 M/S
ECHO AV PEAK GRADIENT: 16 MMHG
ECHO AV PEAK VELOCITY: 2 M/S
ECHO AV REGURGITANT PHT: 793.7 MILLISECOND
ECHO AV VELOCITY RATIO: 0.7
ECHO AV VTI: 32.3 CM
ECHO BSA: 1.99 M2
ECHO EST RA PRESSURE: 3 MMHG
ECHO LA DIAMETER INDEX: 2.17 CM/M2
ECHO LA DIAMETER: 4.3 CM
ECHO LA VOL A-L A2C: 58 ML (ref 18–58)
ECHO LA VOL A-L A4C: 56 ML (ref 18–58)
ECHO LA VOL MOD A2C: 57 ML (ref 18–58)
ECHO LA VOL MOD A4C: 55 ML (ref 18–58)
ECHO LA VOLUME AREA LENGTH: 60 ML
ECHO LA VOLUME INDEX A-L A2C: 29 ML/M2 (ref 16–34)
ECHO LA VOLUME INDEX A-L A4C: 28 ML/M2 (ref 16–34)
ECHO LA VOLUME INDEX AREA LENGTH: 30 ML/M2 (ref 16–34)
ECHO LA VOLUME INDEX MOD A2C: 29 ML/M2 (ref 16–34)
ECHO LA VOLUME INDEX MOD A4C: 28 ML/M2 (ref 16–34)
ECHO LV EF PHYSICIAN: 65 %
ECHO LV FRACTIONAL SHORTENING: 35 % (ref 28–44)
ECHO LV INTERNAL DIMENSION DIASTOLE INDEX: 2.73 CM/M2
ECHO LV INTERNAL DIMENSION DIASTOLIC: 5.4 CM (ref 4.2–5.9)
ECHO LV INTERNAL DIMENSION SYSTOLIC INDEX: 1.77 CM/M2
ECHO LV INTERNAL DIMENSION SYSTOLIC: 3.5 CM
ECHO LV IVSD: 1.2 CM (ref 0.6–1)
ECHO LV MASS 2D: 249.4 G (ref 88–224)
ECHO LV MASS INDEX 2D: 126 G/M2 (ref 49–115)
ECHO LV POSTERIOR WALL DIASTOLIC: 1.1 CM (ref 0.6–1)
ECHO LV RELATIVE WALL THICKNESS RATIO: 0.41
ECHO LVOT AREA: 3.8 CM2
ECHO LVOT AV VTI INDEX: 0.76
ECHO LVOT DIAM: 2.2 CM
ECHO LVOT MEAN GRADIENT: 4 MMHG
ECHO LVOT PEAK GRADIENT: 8 MMHG
ECHO LVOT PEAK VELOCITY: 1.4 M/S
ECHO LVOT STROKE VOLUME INDEX: 46.8 ML/M2
ECHO LVOT SV: 92.7 ML
ECHO LVOT VTI: 24.4 CM
ECHO MV "A" WAVE DURATION: 135 MSEC
ECHO MV A VELOCITY: 0.75 M/S
ECHO MV AREA PHT: 4.1 CM2
ECHO MV AREA VTI: 3.3 CM2
ECHO MV E DECELERATION TIME (DT): 147.1 MS
ECHO MV E VELOCITY: 0.65 M/S
ECHO MV E/A RATIO: 0.87
ECHO MV LVOT VTI INDEX: 1.15
ECHO MV MAX VELOCITY: 0.9 M/S
ECHO MV MEAN GRADIENT: 2 MMHG
ECHO MV MEAN VELOCITY: 0.6 M/S
ECHO MV PEAK GRADIENT: 3 MMHG
ECHO MV PRESSURE HALF TIME (PHT): 54.3 MS
ECHO MV VTI: 28.1 CM
ECHO PV MAX VELOCITY: 1.4 M/S
ECHO PV MEAN GRADIENT: 4 MMHG
ECHO PV MEAN VELOCITY: 0.9 M/S
ECHO PV PEAK GRADIENT: 7 MMHG
ECHO PV VTI: 25 CM
ECHO RIGHT VENTRICULAR SYSTOLIC PRESSURE (RVSP): 25 MMHG
ECHO RV INTERNAL DIMENSION: 3.1 CM
ECHO TV REGURGITANT MAX VELOCITY: 2.37 M/S
ECHO TV REGURGITANT PEAK GRADIENT: 23 MMHG
EKG ATRIAL RATE: 61 BPM
EKG P AXIS: 64 DEGREES
EKG P-R INTERVAL: 196 MS
EKG Q-T INTERVAL: 416 MS
EKG QRS DURATION: 90 MS
EKG QTC CALCULATION (BAZETT): 418 MS
EKG R AXIS: -21 DEGREES
EKG T AXIS: -39 DEGREES
EKG VENTRICULAR RATE: 61 BPM
EOSINOPHIL # BLD: 0 K/UL (ref 0.05–0.5)
EOSINOPHILS RELATIVE PERCENT: 0 % (ref 0–6)
ERYTHROCYTE [DISTWIDTH] IN BLOOD BY AUTOMATED COUNT: 13.2 % (ref 11.5–15)
GFR SERPL CREATININE-BSD FRML MDRD: >90 ML/MIN/1.73M2
GLUCOSE SERPL-MCNC: 101 MG/DL (ref 74–99)
HCT VFR BLD AUTO: 21.6 % (ref 37–54)
HCT VFR BLD AUTO: 23.1 % (ref 37–54)
HGB BLD-MCNC: 7.1 G/DL (ref 12.5–16.5)
HGB BLD-MCNC: 7.6 G/DL (ref 12.5–16.5)
LYMPHOCYTES NFR BLD: 1.06 K/UL (ref 1.5–4)
LYMPHOCYTES RELATIVE PERCENT: 17 % (ref 20–42)
MAGNESIUM SERPL-MCNC: 1.8 MG/DL (ref 1.6–2.6)
MCH RBC QN AUTO: 29.5 PG (ref 26–35)
MCHC RBC AUTO-ENTMCNC: 32.9 G/DL (ref 32–34.5)
MCV RBC AUTO: 89.6 FL (ref 80–99.9)
MONOCYTES NFR BLD: 0.45 K/UL (ref 0.1–0.95)
MONOCYTES NFR BLD: 7 % (ref 2–12)
NEUTROPHILS NFR BLD: 77 % (ref 43–80)
NEUTS SEG NFR BLD: 4.9 K/UL (ref 1.8–7.3)
PLATELET # BLD AUTO: 75 K/UL (ref 130–450)
PLATELET CONFIRMATION: NORMAL
PMV BLD AUTO: 12.2 FL (ref 7–12)
POTASSIUM SERPL-SCNC: 3.2 MMOL/L (ref 3.5–5)
RBC # BLD AUTO: 2.41 M/UL (ref 3.8–5.8)
RBC # BLD: ABNORMAL 10*6/UL
SODIUM SERPL-SCNC: 139 MMOL/L (ref 132–146)
TSH SERPL DL<=0.05 MIU/L-ACNC: 2.3 UIU/ML (ref 0.27–4.2)
WBC OTHER # BLD: 6.4 K/UL (ref 4.5–11.5)

## 2024-04-17 PROCEDURE — 93010 ELECTROCARDIOGRAM REPORT: CPT | Performed by: INTERNAL MEDICINE

## 2024-04-17 PROCEDURE — P9016 RBC LEUKOCYTES REDUCED: HCPCS

## 2024-04-17 PROCEDURE — 6360000002 HC RX W HCPCS: Performed by: NURSE PRACTITIONER

## 2024-04-17 PROCEDURE — 85018 HEMOGLOBIN: CPT

## 2024-04-17 PROCEDURE — 83735 ASSAY OF MAGNESIUM: CPT

## 2024-04-17 PROCEDURE — 6370000000 HC RX 637 (ALT 250 FOR IP): Performed by: FAMILY MEDICINE

## 2024-04-17 PROCEDURE — G0378 HOSPITAL OBSERVATION PER HR: HCPCS

## 2024-04-17 PROCEDURE — 93306 TTE W/DOPPLER COMPLETE: CPT

## 2024-04-17 PROCEDURE — 88112 CYTOPATH CELL ENHANCE TECH: CPT

## 2024-04-17 PROCEDURE — 86900 BLOOD TYPING SEROLOGIC ABO: CPT

## 2024-04-17 PROCEDURE — 36430 TRANSFUSION BLD/BLD COMPNT: CPT

## 2024-04-17 PROCEDURE — 36415 COLL VENOUS BLD VENIPUNCTURE: CPT

## 2024-04-17 PROCEDURE — 99233 SBSQ HOSP IP/OBS HIGH 50: CPT | Performed by: FAMILY MEDICINE

## 2024-04-17 PROCEDURE — 99223 1ST HOSP IP/OBS HIGH 75: CPT | Performed by: INTERNAL MEDICINE

## 2024-04-17 PROCEDURE — 85014 HEMATOCRIT: CPT

## 2024-04-17 PROCEDURE — 86923 COMPATIBILITY TEST ELECTRIC: CPT

## 2024-04-17 PROCEDURE — 85025 COMPLETE CBC W/AUTO DIFF WBC: CPT

## 2024-04-17 PROCEDURE — 93306 TTE W/DOPPLER COMPLETE: CPT | Performed by: INTERNAL MEDICINE

## 2024-04-17 PROCEDURE — 93005 ELECTROCARDIOGRAM TRACING: CPT | Performed by: NURSE PRACTITIONER

## 2024-04-17 PROCEDURE — 2580000003 HC RX 258: Performed by: FAMILY MEDICINE

## 2024-04-17 PROCEDURE — 86901 BLOOD TYPING SEROLOGIC RH(D): CPT

## 2024-04-17 PROCEDURE — 96366 THER/PROPH/DIAG IV INF ADDON: CPT

## 2024-04-17 PROCEDURE — 86850 RBC ANTIBODY SCREEN: CPT

## 2024-04-17 PROCEDURE — 80048 BASIC METABOLIC PNL TOTAL CA: CPT

## 2024-04-17 PROCEDURE — 6370000000 HC RX 637 (ALT 250 FOR IP): Performed by: NURSE PRACTITIONER

## 2024-04-17 PROCEDURE — 84443 ASSAY THYROID STIM HORMONE: CPT

## 2024-04-17 RX ORDER — PANTOPRAZOLE SODIUM 40 MG/1
40 TABLET, DELAYED RELEASE ORAL
Status: DISCONTINUED | OUTPATIENT
Start: 2024-04-17 | End: 2024-04-20 | Stop reason: HOSPADM

## 2024-04-17 RX ORDER — SODIUM CHLORIDE 9 MG/ML
INJECTION, SOLUTION INTRAVENOUS PRN
Status: DISCONTINUED | OUTPATIENT
Start: 2024-04-17 | End: 2024-04-20 | Stop reason: HOSPADM

## 2024-04-17 RX ORDER — SODIUM CHLORIDE 9 MG/ML
INJECTION, SOLUTION INTRAVENOUS CONTINUOUS
Status: DISCONTINUED | OUTPATIENT
Start: 2024-04-17 | End: 2024-04-17

## 2024-04-17 RX ORDER — METOPROLOL SUCCINATE 25 MG/1
50 TABLET, EXTENDED RELEASE ORAL DAILY
Status: DISCONTINUED | OUTPATIENT
Start: 2024-04-18 | End: 2024-04-20 | Stop reason: HOSPADM

## 2024-04-17 RX ORDER — MAGNESIUM SULFATE IN WATER 40 MG/ML
2000 INJECTION, SOLUTION INTRAVENOUS ONCE
Status: COMPLETED | OUTPATIENT
Start: 2024-04-17 | End: 2024-04-17

## 2024-04-17 RX ORDER — POTASSIUM CHLORIDE 20 MEQ/1
20 TABLET, EXTENDED RELEASE ORAL ONCE
Status: DISCONTINUED | OUTPATIENT
Start: 2024-04-17 | End: 2024-04-20 | Stop reason: HOSPADM

## 2024-04-17 RX ORDER — POTASSIUM CHLORIDE 20 MEQ/1
40 TABLET, EXTENDED RELEASE ORAL ONCE
Status: COMPLETED | OUTPATIENT
Start: 2024-04-17 | End: 2024-04-17

## 2024-04-17 RX ADMIN — PANTOPRAZOLE SODIUM 40 MG: 40 TABLET, DELAYED RELEASE ORAL at 19:00

## 2024-04-17 RX ADMIN — SODIUM CHLORIDE, PRESERVATIVE FREE 10 ML: 5 INJECTION INTRAVENOUS at 20:33

## 2024-04-17 RX ADMIN — FOLIC ACID 1 MG: 1 TABLET ORAL at 12:57

## 2024-04-17 RX ADMIN — POTASSIUM BICARBONATE 40 MEQ: 782 TABLET, EFFERVESCENT ORAL at 20:32

## 2024-04-17 RX ADMIN — MAGNESIUM SULFATE HEPTAHYDRATE 2000 MG: 40 INJECTION, SOLUTION INTRAVENOUS at 12:56

## 2024-04-17 RX ADMIN — Medication 100 MG: at 12:56

## 2024-04-17 RX ADMIN — MELATONIN 3 MG ORAL TABLET 3 MG: 3 TABLET ORAL at 20:33

## 2024-04-17 RX ADMIN — POTASSIUM CHLORIDE 40 MEQ: 1500 TABLET, EXTENDED RELEASE ORAL at 10:00

## 2024-04-17 RX ADMIN — TAMSULOSIN HYDROCHLORIDE 0.4 MG: 0.4 CAPSULE ORAL at 12:58

## 2024-04-17 RX ADMIN — Medication 1 CAPSULE: at 12:58

## 2024-04-17 NOTE — ACP (ADVANCE CARE PLANNING)
Advance Care Planning   Healthcare Decision Maker:    Primary Decision Maker: Chantale Negrete - [facility] - [phone]    Click here to complete Healthcare Decision Makers including selection of the Healthcare Decision Maker Relationship (ie \"Primary\").     Electronically signed by TEE Mcclure 4/17/2024 at 3:33 PM

## 2024-04-17 NOTE — CONSULTS
4/17/2024 5:11 PM  Travon Negrete  [id number]     Chief Complaint:    Hematuria      History of Present Illness:      The patient is a 68 y.o. male patient who presented to the hospital with complaints of chest pain.  Urology is asked to evaluate for hematuria.    He has a history of PCA and ED. His previous treating urologist was in [place]. He has a history of prostate cancer that was treated with radiation therapy in 2017.  He also has a history of erectile dysfunction for which he had an inflatable penile prosthesis placed in 2018.  He was seen by our practice during a previous admission in December for gross hematuria from radiation-induced cystitis.  He was taken to the operating room for cystoscopy with fulguration at that time.  He has not had any evidence of any recurrence of his prostate cancer and his PSA has been undetectable.      No past medical history on file.      Past Surgical History:   Procedure Laterality Date    CYSTOSCOPY N/A 12/21/2023    CYSTOSCOPY BILATERAL RETROGRADE PYELOGRAM CLOT EVACUATION, FULGURATION BLADDER, insertion of arboleda catheter performed by Baron Mcdonald MD at [facility]       Medications Prior to Admission:    Medications Prior to Admission: lactobacillus (CULTURELLE) CAPS capsule, Take 1 capsule by mouth daily  furosemide (LASIX) 20 MG tablet, Take 1 tablet by mouth daily  metoprolol succinate (TOPROL XL) 25 MG extended release tablet, Take 1 tablet by mouth daily  folic acid (FOLVITE) 1 MG tablet, Take 1 tablet by mouth daily  vitamin B-1 (THIAMINE) 100 MG tablet, Take 1 tablet by mouth daily  losartan (COZAAR) 100 MG tablet, Take 1 tablet by mouth daily  gabapentin (NEURONTIN) 100 MG capsule, Take 1 capsule by mouth every 12 hours as needed (for nerve pain).  tamsulosin (FLOMAX) 0.4 MG capsule, Take 1 capsule by mouth daily    Allergies:    Patient has no known allergies.    Social History:    reports that he has been smoking cigarettes. He started smoking about 54

## 2024-04-17 NOTE — PROGRESS NOTES
Hospitalist Progress Note      SYNOPSIS: Patient admitted on 2024 for Atrial fibrillation (HCC)      SUBJECTIVE:    Patient seen and examined  Records reviewed.     68-year-old male past medical history of alcohol abuse, hematuria, paroxymal atrial fibrillation, prostate cancer, GI bleed requiring outpatient capsule endoscopy, coronary artery disease presents ED due to chest pain.  He was found to be in atrial fibrillation with RVR on presentation with heart rate of 122.  CT angiogram of the chest was negative for pulm embolism.  Hemoglobin 9.2.  Respiratory panel was negative.  Urinalysis with no evidence of UTI, 6-9 RBCs and moderate hemoglobin present.  Chest x-ray negative for any acute findings.  Potassium of 3.4 and magnesium of 1.3.  Troponin of 18.  He was given IV fluids and oral potassium and IV magnesium replacement.  Heart rate did improve.Patient denies any problems breathing when he lays flat just mentions to have chest pain off and on and significant dyspnea on exertion.  He did mention that he had the capsule endoscopy and lower endoscopy as recommended and he was told by the GI doctor that there was no concerns.  He is unsure about cystoscopy.  Hemoglobin did drop to 7.1 given this morning of blood was ordered and stress test was held.  Echocardiogram ordered and pending.  Cardiology following.  He also reported black stools for which surgery has been consulted and also urology given some blood in his urinalysis.    Stable overnight. No other overnight issues reported.   Temp (24hrs), Av.9 °F (36.6 °C), Min:97.4 °F (36.3 °C), Max:98.1 °F (36.7 °C)    DIET: Diet NPO  CODE: Full Code    Intake/Output Summary (Last 24 hours) at 2024 0938  Last data filed at 2024 0714  Gross per 24 hour   Intake 0 ml   Output 300 ml   Net -300 ml       OBJECTIVE:    BP (!) 94/51   Pulse 67   Temp 98 °F (36.7 °C) (Temporal)   Resp 18   Ht 1.778 m (5' 10\")   Wt 79.8 kg (176 lb)   SpO2 98%    potassium chloride **OR** potassium alternative oral replacement **OR** potassium chloride, magnesium sulfate, ondansetron **OR** ondansetron, polyethylene glycol, acetaminophen **OR** acetaminophen, regadenoson    Labs:     Recent Labs     04/16/24  1200 04/17/24  0454   WBC 6.9 6.4   HGB 9.2* 7.1*   HCT 28.9* 21.6*   PLT 95* 75*       Recent Labs     04/16/24  1200 04/17/24  0454    139   K 3.4* 3.2*   CL 97* 100   CO2 23 21*   BUN 47* 43*   CREATININE 1.0 0.9   CALCIUM 10.7* 9.7       Recent Labs     04/16/24  1200   PROT 8.4*   ALKPHOS 182*   ALT 8   AST 24   BILITOT 2.5*   LIPASE 13       No results for input(s): \"INR\" in the last 72 hours.    No results for input(s): \"CKTOTAL\", \"TROPONINI\" in the last 72 hours.    Chronic labs:    Lab Results   Component Value Date    PSA <0.01 11/19/2023    INR 1.1 12/18/2023    LABA1C 4.9 11/02/2023       Radiology: REVIEWED DAILY    +++++++++++++++++++++++++++++++++++++++++++++++++  Anthony Brody MD  Hospitalist  [place], [place]  +++++++++++++++++++++++++++++++++++++++++++++++++  NOTE: This report was transcribed using voice recognition software. Every effort was made to ensure accuracy; however, inadvertent computerized transcription errors may be present.

## 2024-04-17 NOTE — CARE COORDINATION
04/17/24 Transition of care:  Pt admitted OBS for afib Cardiology consulted stress test held due to Hgb of 7.1 Met with pt to discuss transition of care and discharge preferences Pt does not want to include family in discharge planning Pt lives with wife in an apartment with elevator Pt is independent with ADLs Pt uses no AD for ambulation Pt has no HHC or KORY history PCP is [facility] is [address] Plan is to  return home with no needs currently expressed for discharge Wife will transport CM/SW to follow Electronically signed by Roly Hinton RN CM on 4/17/2024 at 3:38 PM     Case Management Assessment  Initial Evaluation    Date/Time of Evaluation: 4/17/2024 3:38 PM  Assessment Completed by: Roly Hinton RN    If patient is discharged prior to next notation, then this note serves as note for discharge by case management.    Patient Name: Travon Negrete                   YOB: 1955  Diagnosis: Atrial fibrillation (HCC) [I48.91]  Paroxysmal atrial fibrillation (HCC) [I48.0]  Atrial fibrillation with rapid ventricular response (HCC) [I48.91]  Atrial fibrillation, unspecified type (HCC) [I48.91]  Chest pain, unspecified type [R07.9]                   Date / Time: 4/16/2024 11:24 AM    Patient Admission Status: Observation   Readmission Risk (Low < 19, Mod (19-27), High > 27): Readmission Risk Score: 22.7    Current PCP: Daniel Cuello MD  PCP verified by CM?      Chart Reviewed: Yes      History Provided by:    Patient Orientation:      Patient Cognition:      Hospitalization in the last 30 days (Readmission):  No    If yes, Readmission Assessment in  Navigator will be completed.    Advance Directives:      Code Status: Full Code   Patient's Primary Decision Maker is: Legal Next of Kin    Primary Decision Maker: Chantale Negrete - Spouse - [phone]    Discharge Planning:    Patient lives with: Spouse/Significant Other Type of Home: Apartment  Primary Care Giver:    Patient  Support Systems include: Spouse/Significant Other   Current Financial resources:    Current community resources:    Current services prior to admission: None            Current DME:              Type of Home Care services:  None    ADLS  Prior functional level:    Current functional level:      PT AM-PAC:   /24  OT AM-PAC:   /24    Family can provide assistance at DC:    Would you like Case Management to discuss the discharge plan with any other family members/significant others, and if so, who?    Plans to Return to Present Housing:    Other Identified Issues/Barriers to RETURNING to current housing: yes  Potential Assistance needed at discharge: N/A            Potential DME:    Patient expects to discharge to: House  Plan for transportation at discharge:  wife    Financial    Payor: [facility] MEDICARE / Plan: [employer] DUAL COMPLETE / Product Type: *No Product type* /     Does insurance require precert for SNF:     Potential assistance Purchasing Medications:    Meds-to-Beds request:        [facility] - [facility] [address] - P [phone] - F [phone]  [facility] [address]  [place] [id number]  Phone: [phone] Fax: [phone]    [facility] [address] [place] [address] [address] - P [phone] - F [phone]  [address]  [place] [id number]  Phone: [phone] Fax: [phone]      Notes:    Factors facilitating achievement of predicted outcomes: Family support    Barriers to discharge: yes    Additional Case Management Notes: see notes    The Plan for Transition of Care is related to the following treatment goals of Atrial fibrillation (HCC) [I48.91]  Paroxysmal atrial fibrillation (HCC) [I48.0]  Atrial fibrillation with rapid ventricular response (HCC) [I48.91]  Atrial fibrillation, unspecified type (HCC) [I48.91]  Chest pain, unspecified type [R07.9]        The Patient and/or Patient Representative Agree with the Discharge Plan?  yes    Roly Hinton,

## 2024-04-17 NOTE — CONSULTS
INPATIENT CARDIOLOGY CONSULT     Reason for Consult: PAF with RVR    Cardiologist: Dr. Rudd     Requesting Physician:  Dr. Cruz    Date of Consultation: 4/17/2024    HISTORY OF PRESENT ILLNESS:   Travon Negrete is a 68-year-old male who is known to Dr. Rudd only during initial consultation on 11/2/2023 for irregular heart rate/sinus tachycardia --> patient was noted to have newly diagnosed Afib at that time --> spontaneously converted to sinus rhythm.  He originally presented to the ED for abnormal labs, fecal occult was negative in the emergency department.  Initial proBNP was 278, troponin 15, creatinine 0.5, hemoglobin 6.5 and underwent 1 unit of packed red blood cells with repeat hemoglobin of 7.5.  CT of the abdomen and pelvis showed moderate ascites possibly due to hepatic disease.  Patient was given a second unit of blood on 11/1/2023.  GI was consulted with plans for an outpatient capsule endoscopy.  Patient was not an anticoagulation candidate due to low hemoglobin levels requiring blood transfusions.  [id number] score at least 2 and HAS-BLED score at least 4.1%. He was started on Toprol-XL 25 mg QD with no additional cardiac testing needed at that time.  Patient was discharged home. No follow-up with cardiology since that time.     Recent Encounters:   Admission: 11/16/2023-11/20/2023 for painless hematuria.  CT showed component of colitis.  And also proctitis.  UA was consistent with hematuria.  GI was consulted for abdominal ascites and colitis for possible paracentesis (not completed during admission). Patient was recommended to follow-up with GI as outpatient.   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  Patient underwent a cystoscopy panendoscopy, retrograde pyelogram, creation of bladder bleeding on 12/21/2023.  Reported having a capsule endoscopy as an OP with Dr. Smiley ~ 3/2024 that he was told was \"normal.\"    Patient stated for the past  diagnosis and all of the proposed management plans with the patient     NOTE: This report was transcribed using voice recognition software. Every effort was made to ensure accuracy; however, inadvertent computerized transcription errors may be present

## 2024-04-17 NOTE — PROGRESS NOTES
4 Eyes Skin Assessment     NAME:  Travon Negrete  YOB: 1955  MEDICAL RECORD NUMBER:  [id number]    The patient is being assessed for  Admission    I agree that at least one RN has performed a thorough Head to Toe Skin Assessment on the patient. ALL assessment sites listed below have been assessed.      Areas assessed by both nurses:    Head, Face, Ears, Shoulders, Back, Chest, Arms, Elbows, Hands, Sacrum. Buttock, Coccyx, Ischium, and Legs. Feet and Heels        Does the Patient have a Wound? No noted wound(s)       Robert Prevention initiated by RN: No  Wound Care Orders initiated by RN: No    Pressure Injury (Stage 3,4, Unstageable, DTI, NWPT, and Complex wounds) if present, place Wound referral order by RN under : No    New Ostomies, if present place, Ostomy referral order under : No     Nurse 1 eSignature: Electronically signed by Mili Estevez RN on 4/17/24 at 12:15 AM EDT    **SHARE this note so that the co-signing nurse can place an eSignature**    Nurse 2 eSignature: Electronically signed by Danuta Buitrago RN on 4/17/24 at 12:17 AM EDT

## 2024-04-18 PROBLEM — D64.9 ACUTE ANEMIA: Status: ACTIVE | Noted: 2024-04-18

## 2024-04-18 LAB
ANION GAP SERPL CALCULATED.3IONS-SCNC: 16 MMOL/L (ref 7–16)
BASOPHILS # BLD: 0.01 K/UL (ref 0–0.2)
BASOPHILS # BLD: 0.02 K/UL (ref 0–0.2)
BASOPHILS NFR BLD: 0 % (ref 0–2)
BASOPHILS NFR BLD: 0 % (ref 0–2)
BUN SERPL-MCNC: 28 MG/DL (ref 6–23)
CALCIUM SERPL-MCNC: 9.3 MG/DL (ref 8.6–10.2)
CHLORIDE SERPL-SCNC: 96 MMOL/L (ref 98–107)
CO2 SERPL-SCNC: 22 MMOL/L (ref 22–29)
CREAT SERPL-MCNC: 0.7 MG/DL (ref 0.7–1.2)
EOSINOPHIL # BLD: 0.06 K/UL (ref 0.05–0.5)
EOSINOPHIL # BLD: 0.06 K/UL (ref 0.05–0.5)
EOSINOPHILS RELATIVE PERCENT: 1 % (ref 0–6)
EOSINOPHILS RELATIVE PERCENT: 1 % (ref 0–6)
ERYTHROCYTE [DISTWIDTH] IN BLOOD BY AUTOMATED COUNT: 13.2 % (ref 11.5–15)
ERYTHROCYTE [DISTWIDTH] IN BLOOD BY AUTOMATED COUNT: 13.4 % (ref 11.5–15)
FERRITIN SERPL-MCNC: 1328 NG/ML
GFR SERPL CREATININE-BSD FRML MDRD: >90 ML/MIN/1.73M2
GLUCOSE SERPL-MCNC: 96 MG/DL (ref 74–99)
HCT VFR BLD AUTO: 21.3 % (ref 37–54)
HCT VFR BLD AUTO: 21.4 % (ref 37–54)
HCT VFR BLD AUTO: 22.2 % (ref 37–54)
HGB BLD-MCNC: 6.9 G/DL (ref 12.5–16.5)
HGB BLD-MCNC: 7.2 G/DL (ref 12.5–16.5)
HGB BLD-MCNC: 7.2 G/DL (ref 12.5–16.5)
IMM GRANULOCYTES # BLD AUTO: 0.03 K/UL (ref 0–0.58)
IMM GRANULOCYTES # BLD AUTO: <0.03 K/UL (ref 0–0.58)
IMM GRANULOCYTES NFR BLD: 0 % (ref 0–5)
IMM GRANULOCYTES NFR BLD: 1 % (ref 0–5)
IRON SATN MFR SERPL: 33 % (ref 20–55)
IRON SERPL-MCNC: 76 UG/DL (ref 59–158)
LYMPHOCYTES NFR BLD: 1.01 K/UL (ref 1.5–4)
LYMPHOCYTES NFR BLD: 1.25 K/UL (ref 1.5–4)
LYMPHOCYTES RELATIVE PERCENT: 20 % (ref 20–42)
LYMPHOCYTES RELATIVE PERCENT: 23 % (ref 20–42)
MCH RBC QN AUTO: 29.5 PG (ref 26–35)
MCH RBC QN AUTO: 30 PG (ref 26–35)
MCHC RBC AUTO-ENTMCNC: 32.4 G/DL (ref 32–34.5)
MCHC RBC AUTO-ENTMCNC: 33.6 G/DL (ref 32–34.5)
MCV RBC AUTO: 89.2 FL (ref 80–99.9)
MCV RBC AUTO: 91 FL (ref 80–99.9)
MONOCYTES NFR BLD: 0.49 K/UL (ref 0.1–0.95)
MONOCYTES NFR BLD: 0.62 K/UL (ref 0.1–0.95)
MONOCYTES NFR BLD: 10 % (ref 2–12)
MONOCYTES NFR BLD: 12 % (ref 2–12)
NEUTROPHILS NFR BLD: 64 % (ref 43–80)
NEUTROPHILS NFR BLD: 69 % (ref 43–80)
NEUTS SEG NFR BLD: 3.43 K/UL (ref 1.8–7.3)
NEUTS SEG NFR BLD: 3.52 K/UL (ref 1.8–7.3)
PLATELET CONFIRMATION: NORMAL
PLATELET CONFIRMATION: NORMAL
PLATELET, FLUORESCENCE: 88 K/UL (ref 130–450)
PLATELET, FLUORESCENCE: 90 K/UL (ref 130–450)
PMV BLD AUTO: 11.8 FL (ref 7–12)
PMV BLD AUTO: 12.4 FL (ref 7–12)
POTASSIUM SERPL-SCNC: 3.8 MMOL/L (ref 3.5–5)
RBC # BLD AUTO: 2.34 M/UL (ref 3.8–5.8)
RBC # BLD AUTO: 2.4 M/UL (ref 3.8–5.8)
RBC # BLD: ABNORMAL 10*6/UL
SODIUM SERPL-SCNC: 134 MMOL/L (ref 132–146)
TIBC SERPL-MCNC: 228 UG/DL (ref 250–450)
WBC OTHER # BLD: 5.1 K/UL (ref 4.5–11.5)
WBC OTHER # BLD: 5.4 K/UL (ref 4.5–11.5)

## 2024-04-18 PROCEDURE — 6370000000 HC RX 637 (ALT 250 FOR IP): Performed by: FAMILY MEDICINE

## 2024-04-18 PROCEDURE — 85025 COMPLETE CBC W/AUTO DIFF WBC: CPT

## 2024-04-18 PROCEDURE — 83540 ASSAY OF IRON: CPT

## 2024-04-18 PROCEDURE — 82728 ASSAY OF FERRITIN: CPT

## 2024-04-18 PROCEDURE — 99232 SBSQ HOSP IP/OBS MODERATE 35: CPT | Performed by: INTERNAL MEDICINE

## 2024-04-18 PROCEDURE — 85018 HEMOGLOBIN: CPT

## 2024-04-18 PROCEDURE — 83550 IRON BINDING TEST: CPT

## 2024-04-18 PROCEDURE — 85014 HEMATOCRIT: CPT

## 2024-04-18 PROCEDURE — 99233 SBSQ HOSP IP/OBS HIGH 50: CPT | Performed by: INTERNAL MEDICINE

## 2024-04-18 PROCEDURE — 2580000003 HC RX 258: Performed by: FAMILY MEDICINE

## 2024-04-18 PROCEDURE — 1200000000 HC SEMI PRIVATE

## 2024-04-18 PROCEDURE — G0378 HOSPITAL OBSERVATION PER HR: HCPCS

## 2024-04-18 PROCEDURE — 99222 1ST HOSP IP/OBS MODERATE 55: CPT | Performed by: NURSE PRACTITIONER

## 2024-04-18 PROCEDURE — 6370000000 HC RX 637 (ALT 250 FOR IP): Performed by: INTERNAL MEDICINE

## 2024-04-18 PROCEDURE — 80048 BASIC METABOLIC PNL TOTAL CA: CPT

## 2024-04-18 PROCEDURE — 36415 COLL VENOUS BLD VENIPUNCTURE: CPT

## 2024-04-18 RX ADMIN — Medication 100 MG: at 09:28

## 2024-04-18 RX ADMIN — GABAPENTIN 100 MG: 100 CAPSULE ORAL at 20:24

## 2024-04-18 RX ADMIN — PANTOPRAZOLE SODIUM 40 MG: 40 TABLET, DELAYED RELEASE ORAL at 06:14

## 2024-04-18 RX ADMIN — Medication 1 CAPSULE: at 09:28

## 2024-04-18 RX ADMIN — PANTOPRAZOLE SODIUM 40 MG: 40 TABLET, DELAYED RELEASE ORAL at 17:00

## 2024-04-18 RX ADMIN — MELATONIN 3 MG ORAL TABLET 3 MG: 3 TABLET ORAL at 20:24

## 2024-04-18 RX ADMIN — METOPROLOL SUCCINATE 50 MG: 25 TABLET, EXTENDED RELEASE ORAL at 09:27

## 2024-04-18 RX ADMIN — GABAPENTIN 100 MG: 100 CAPSULE ORAL at 09:53

## 2024-04-18 RX ADMIN — FOLIC ACID 1 MG: 1 TABLET ORAL at 09:28

## 2024-04-18 RX ADMIN — SODIUM CHLORIDE, PRESERVATIVE FREE 10 ML: 5 INJECTION INTRAVENOUS at 20:25

## 2024-04-18 RX ADMIN — SODIUM CHLORIDE, PRESERVATIVE FREE 10 ML: 5 INJECTION INTRAVENOUS at 09:00

## 2024-04-18 RX ADMIN — TAMSULOSIN HYDROCHLORIDE 0.4 MG: 0.4 CAPSULE ORAL at 09:28

## 2024-04-18 NOTE — CONSULTS
smoking cigarettes. He started smoking about 53 years ago. He has a 26.7 pack-year smoking history. He has never used smokeless tobacco.  ETOH:   reports current alcohol use.  DRUGS:   reports no history of drug use.  Family History:   No family history on file.      Current Facility-Administered Medications:     0.9 % sodium chloride infusion, , IntraVENous, PRN, Anthony Brody MD    pantoprazole (PROTONIX) tablet 40 mg, 40 mg, Oral, BID AC, Anthony Brody MD, 40 mg at 04/18/24 0614    potassium chloride (KLOR-CON M) extended release tablet 20 mEq, 20 mEq, Oral, Once, Eleanor Alcocer, CARYN - CNP    metoprolol succinate (TOPROL XL) extended release tablet 50 mg, 50 mg, Oral, Daily, Chris Rudd MD, 50 mg at 04/18/24 0927    perflutren lipid microspheres (DEFINITY) injection 1.5 mL, 1.5 mL, IntraVENous, ONCE PRN, Anthony Brody MD    folic acid (FOLVITE) tablet 1 mg, 1 mg, Oral, Daily, Anthony Brody MD, 1 mg at 04/18/24 0928    [Held by provider] furosemide (LASIX) tablet 20 mg, 20 mg, Oral, Daily, Anthony Brody MD    gabapentin (NEURONTIN) capsule 100 mg, 100 mg, Oral, Q12H PRN, Anthony Brody MD, 100 mg at 04/18/24 0953    thiamine tablet 100 mg, 100 mg, Oral, Daily, Anthony Brody MD, 100 mg at 04/18/24 0928    tamsulosin (FLOMAX) capsule 0.4 mg, 0.4 mg, Oral, Daily, Anthony Brody MD, 0.4 mg at 04/18/24 0928    [Held by provider] losartan (COZAAR) tablet 100 mg, 100 mg, Oral, Daily, Anthony Brody MD    lactobacillus (CULTURELLE) capsule 1 capsule, 1 capsule, Oral, Daily, Anthony Brody MD, 1 capsule at 04/18/24 0928    sodium chloride flush 0.9 % injection 5-40 mL, 5-40 mL, IntraVENous, 2 times per day, Anthony Brody MD, 10 mL at 04/17/24 2033    sodium chloride flush 0.9 % injection 5-40 mL, 5-40 mL, IntraVENous, PRN, Anthony Brody MD    0.9 % sodium chloride infusion, , IntraVENous, PRN, Anthony Brody MD    magnesium  than or equal to 60 minutes was spent providing face-to-face patient care, including:  and coordinating care, reviewing the chart, labs, and diagnostics, as well as medical decision making. Greater than 50% of this time was spent instructing and counseling the patient face to face regarding findings and recommendations.    Thank you for including us in the care of this patient. Please do not hesitate to contact us with any additional questions or concerns.      Discussed with Dr. Smiley.  Electronically signed by CARYN Maradiaga CNP on 4/18/2024 at 11:47 AM

## 2024-04-18 NOTE — CONSULTS
[facility]   Gastroenterology, Hepatology, &  Advanced Endoscopy    Consult       ASSESSMENT AND PLAN:        Acute on Chronic anemia in the setting of A. Fib. (Patient was not an anticoagulation candidate due to low hemoglobin levels requiring blood transfusions.)  -Hgb 7.2, 7.6, 7.1, 9.2  - Await Hemoccult stool  - INR 1.1  - Transfuse to keep Hgb >7  - peripheral smear ordered to review.  - Iron studies ordered  - MCV 89.2  - RDW 13.2  - No current plans for repeat scopes  - Monitor for further episodes of melena, hematochezia     - Hematuria  -Urology consulted    HISTORY OF PRESENT ILLNESS:      Mr. Travon Negrete is a 68y/M w/ history of EtOH abuse who has recently decreased EtOH intake, Afib, prostate cancer, and anemia who presents to the ED with Afib with RVR. He is found to also be anemic. The patient denies melena or hematochezia. He has had extensive outpatient evaluation including EGD, Colonoscopy, and (VCE) Capsule Endoscopy with no signs of active bleeding or high risk lesions.                       68-year-old male past medical history of alcohol abuse, hematuria, paroxymal atrial fibrillation, prostate cancer, GI bleed requiring outpatient capsule endoscopy, coronary artery disease presents ED due to chest pain.  He was found to be in atrial fibrillation with RVR on presentation with heart rate of 122.  CT angiogram of the chest was negative for pulm embolism.  Hemoglobin 9.2.  Respiratory panel was negative.     Past Medical History:    No past medical history on file.  Past Surgical History:        Procedure Laterality Date    CYSTOSCOPY N/A 12/21/2023    CYSTOSCOPY BILATERAL RETROGRADE PYELOGRAM CLOT EVACUATION, FULGURATION BLADDER, insertion of arboleda catheter [place] Baron Mcdonald [place] [facility] [place]     Social History:    TOBACCO:   reports that he has been smoking cigarettes. He started smoking about 53 years ago. He has a 26.7 pack-year smoking history. He has never used smokeless  tablet 4 mg, 4 mg, Oral, Q8H PRN **OR** ondansetron (ZOFRAN) injection 4 mg, 4 mg, IntraVENous, Q6H PRN, Anthony Brody MD    polyethylene glycol (GLYCOLAX) packet 17 g, 17 g, Oral, Daily PRN, Anthony Brody MD    acetaminophen (TYLENOL) tablet 650 mg, 650 mg, Oral, Q6H PRN **OR** acetaminophen (TYLENOL) suppository 650 mg, 650 mg, Rectal, Q6H PRN, Anthony Brody MD    melatonin tablet 3 mg, 3 mg, Oral, Nightly, Anthony Brody MD, 3 mg at 04/17/24 2033     Allergies:  Patient has no known allergies.    ROS:  Aside from what was mentioned in the past medical history and history of present illness, essentially unremarkable, all others are negative.      Recent Labs     04/16/24  1200   ALT 8   AST 24   ALKPHOS 182*   BILITOT 2.5*       Lab Results   Component Value Date    WBC 5.1 04/18/2024    HGB 7.2 (L) 04/18/2024    HCT 21.4 (L) 04/18/2024    PLT 75 (L) 04/17/2024     04/18/2024    K 3.8 04/18/2024    CL 96 (L) 04/18/2024    CREATININE 0.7 04/18/2024    BUN 28 (H) 04/18/2024    CO2 22 04/18/2024    FOLATE <2.0 (L) 10/31/2023    [id number] 1589 (H) 10/31/2023    GLUCOSE 96 04/18/2024    INR 1.1 12/18/2023    APTT 35.0 12/18/2023    TSH 2.30 04/17/2024    LABA1C 4.9 11/02/2023         PHYSICAL EXAM:  BP (!) 95/53   Pulse 66   Temp 97.8 °F (36.6 °C) (Temporal)   Resp 18   Ht 1.778 m (5' 10\")   Wt 79.8 kg (176 lb)   SpO2 97%   BMI 25.25 kg/m²   Physical Exam:  General: Overall well-appearing, NAD  HEENT: PERRLA, EOMI, Anicteric sclera, MMM, no rhinorrhea  Cards: RRR, no LE edema  Resp: Breathing comfortably, good air movement, no use of accessory muscles, no audible wheezing  Abdomen: soft, flat  Extremities: Moves all extremities, no effusions or bruising.  Skin: No rashes or jaundice  Neuro: A&O x 3, CN grossly intact, non-focal exam     Greater than or equal to 60 minutes was spent providing face-to-face patient care, including:  and coordinating care, reviewing the

## 2024-04-18 NOTE — PROGRESS NOTES
[facility] Hospitalist Progress Note    Admitting Date and Time: 4/16/2024 11:24 AM  Admit Dx: Atrial fibrillation (HCC) [I48.91]  Paroxysmal atrial fibrillation (HCC) [I48.0]  Atrial fibrillation with rapid ventricular response (HCC) [I48.91]  Atrial fibrillation, unspecified type (HCC) [I48.91]  Chest pain, unspecified type [R07.9]  Acute anemia [D64.9]    Subjective:  Patient is being followed for Atrial fibrillation (HCC) [I48.91]  Paroxysmal atrial fibrillation (HCC) [I48.0]  Atrial fibrillation with rapid ventricular response (HCC) [I48.91]  Atrial fibrillation, unspecified type (HCC) [I48.91]  Chest pain, unspecified type [R07.9]  Acute anemia [D64.9]   Pt seen and examined this morning  No acute events overnight  Lying in bed comfortably, no acute distress  Denies any acute complaints today    ROS: denies fever, chills, cp, sob, n/v, HA unless stated above.      pantoprazole  40 mg Oral BID AC    potassium chloride  20 mEq Oral Once    metoprolol succinate  50 mg Oral Daily    folic acid  1 mg Oral Daily    [Held by provider] furosemide  20 mg Oral Daily    thiamine  100 mg Oral Daily    tamsulosin  0.4 mg Oral Daily    [Held by provider] losartan  100 mg Oral Daily    lactobacillus  1 capsule Oral Daily    sodium chloride flush  5-40 mL IntraVENous 2 times per day    melatonin  3 mg Oral Nightly     sodium chloride, , PRN  perflutren lipid microspheres, 1.5 mL, ONCE PRN  gabapentin, 100 mg, Q12H PRN  sodium chloride flush, 5-40 mL, PRN  sodium chloride, , PRN  magnesium sulfate, 2,000 mg, PRN  ondansetron, 4 mg, Q8H PRN   Or  ondansetron, 4 mg, Q6H PRN  polyethylene glycol, 17 g, Daily PRN  acetaminophen, 650 mg, Q6H PRN   Or  acetaminophen, 650 mg, Q6H PRN         Objective:    BP (!) 95/53   Pulse 66   Temp 97.8 °F (36.6 °C) (Temporal)   Resp 18   Ht 1.778 m (5' 10\")   Wt 79.8 kg (176 lb)   SpO2 97%   BMI 25.25 kg/m²     General Appearance: alert and oriented to person, place and time and  in no acute distress  Skin: warm and dry  Head: normocephalic and atraumatic  Eyes: pupils equal, round, and reactive to light, extraocular eye movements intact, conjunctivae normal  Neck: neck supple and non tender without mass   Pulmonary/Chest: clear to auscultation bilaterally- no wheezes, rales or rhonchi, normal air movement, no respiratory distress  Cardiovascular: normal rate, normal S1 and S2 and no carotid bruits  Abdomen: soft, non-tender, non-distended, normal bowel sounds, no masses or organomegaly  Extremities: no cyanosis, no clubbing and no edema  Neurologic: no cranial nerve deficit and speech normal        Recent Labs     04/16/24  1200 04/17/24 0454 04/18/24  0435    139 134   K 3.4* 3.2* 3.8   CL 97* 100 96*   CO2 23 21* 22   BUN 47* 43* 28*   CREATININE 1.0 0.9 0.7   GLUCOSE 135* 101* 96   CALCIUM 10.7* 9.7 9.3       Recent Labs     04/16/24  1200 04/17/24 0454 04/17/24 2057 04/18/24  0435   WBC 6.9 6.4  --  5.1   RBC 3.18* 2.41*  --  2.40*   HGB 9.2* 7.1* 7.6* 7.2*   HCT 28.9* 21.6* 23.1* 21.4*   MCV 90.9 89.6  --  89.2   MCH 28.9 29.5  --  30.0   MCHC 31.8* 32.9  --  33.6   RDW 13.3 13.2  --  13.2   PLT 95* 75*  --   --    MPV 12.7* 12.2*  --  11.8         Assessment and Plan:     Principal Problem:    Atrial fibrillation (HCC)  Active Problems:    Anemia    Chest pain    Dyspnea on exertion    Atrial fibrillation with rapid ventricular response (HCC)    Nonrheumatic mitral valve regurgitation    Nonrheumatic aortic valve insufficiency    Acute anemia  Resolved Problems:    * No resolved hospital problems. *    Acute on chronic anemia.  Hemoglobin dropped to 7.1 from 9.2 on admission.  Complains of hematuria and melena.  Urology and GI consulted.  PPI twice daily.  1 unit transfused and hemoglobin went up to 7.6.  Drop back down to 7.2 today.  Appreciate specialist input.  Continue to monitor H&H and transfuse as needed  Gross hematuria. CT negative for any obstructive process.

## 2024-04-18 NOTE — PROGRESS NOTES
4/18/2024 4:16 PM  Travon Negrete  [id number]    Subjective:    He is sitting up in bed  Voiding without difficulty  There is no gross hematuria, urine is rudy in color    Review of Systems  Constitutional: No fever or chills   Respiratory: negative for cough and hemoptysis  Cardiovascular: negative for chest pain and dyspnea  Gastrointestinal: negative for abdominal pain, diarrhea, nausea and vomiting   : See above  Derm: negative for rash and skin lesion(s)  Neurological: negative for seizures and tremors  Musculoskeletal: Negative    Psychiatric: Negative   All other reviews are negative      Scheduled Meds:   pantoprazole  40 mg Oral BID AC    potassium chloride  20 mEq Oral Once    metoprolol succinate  50 mg Oral Daily    folic acid  1 mg Oral Daily    [Held by provider] furosemide  20 mg Oral Daily    thiamine  100 mg Oral Daily    tamsulosin  0.4 mg Oral Daily    [Held by provider] losartan  100 mg Oral Daily    lactobacillus  1 capsule Oral Daily    sodium chloride flush  5-40 mL IntraVENous 2 times per day    melatonin  3 mg Oral Nightly       Objective:  Vitals:    04/18/24 1408   BP: (!) 95/59   Pulse: 71   Resp: 18   Temp: 97.9 °F (36.6 °C)   SpO2: 100%         Allergies: Patient has no known allergies.    General Appearance: alert and oriented to person, place and time and in no acute distress  Skin: no rash or erythema  Head: normocephalic and atraumatic  Pulmonary/Chest: normal air movement, no respiratory distress  Abdomen: soft, non-tender, non-distended  Genitourinary: No Anaya  Extremities: no cyanosis, clubbing or edema         Labs:     Recent Labs     04/18/24  0435      K 3.8   CL 96*   CO2 22   BUN 28*   CREATININE 0.7   GLUCOSE 96   CALCIUM 9.3       Lab Results   Component Value Date/Time    HGB 7.2 04/18/2024 11:59 AM    HCT 22.2 04/18/2024 11:59 AM       Lab Results   Component Value Date    PSA <0.01 11/19/2023         Assessment/Plan:  Bilateral renal cysts  PCA s/p XRT in

## 2024-04-19 LAB
ALBUMIN SERPL-MCNC: 3.9 G/DL (ref 3.5–5.2)
ALP SERPL-CCNC: 133 U/L (ref 40–129)
ALT SERPL-CCNC: 7 U/L (ref 0–40)
ANION GAP SERPL CALCULATED.3IONS-SCNC: 14 MMOL/L (ref 7–16)
AST SERPL-CCNC: 37 U/L (ref 0–39)
BASOPHILS # BLD: 0.01 K/UL (ref 0–0.2)
BASOPHILS NFR BLD: 0 % (ref 0–2)
BILIRUB SERPL-MCNC: 1.7 MG/DL (ref 0–1.2)
BUN SERPL-MCNC: 25 MG/DL (ref 6–23)
CALCIUM SERPL-MCNC: 9.4 MG/DL (ref 8.6–10.2)
CHLORIDE SERPL-SCNC: 98 MMOL/L (ref 98–107)
CO2 SERPL-SCNC: 22 MMOL/L (ref 22–29)
CREAT SERPL-MCNC: 0.9 MG/DL (ref 0.7–1.2)
EOSINOPHIL # BLD: 0.07 K/UL (ref 0.05–0.5)
EOSINOPHILS RELATIVE PERCENT: 1 % (ref 0–6)
ERYTHROCYTE [DISTWIDTH] IN BLOOD BY AUTOMATED COUNT: 13.2 % (ref 11.5–15)
FOLATE SERPL-MCNC: 13.4 NG/ML (ref 4.8–24.2)
GFR SERPL CREATININE-BSD FRML MDRD: >90 ML/MIN/1.73M2
GLUCOSE SERPL-MCNC: 98 MG/DL (ref 74–99)
HCT VFR BLD AUTO: 20.6 % (ref 37–54)
HCT VFR BLD AUTO: 22.4 % (ref 37–54)
HGB BLD-MCNC: 6.8 G/DL (ref 12.5–16.5)
HGB BLD-MCNC: 7.4 G/DL (ref 12.5–16.5)
IMM GRANULOCYTES # BLD AUTO: <0.03 K/UL (ref 0–0.58)
IMM GRANULOCYTES NFR BLD: 0 % (ref 0–5)
LYMPHOCYTES NFR BLD: 1.12 K/UL (ref 1.5–4)
LYMPHOCYTES RELATIVE PERCENT: 20 % (ref 20–42)
MCH RBC QN AUTO: 29.7 PG (ref 26–35)
MCHC RBC AUTO-ENTMCNC: 33 G/DL (ref 32–34.5)
MCV RBC AUTO: 90 FL (ref 80–99.9)
MONOCYTES NFR BLD: 0.86 K/UL (ref 0.1–0.95)
MONOCYTES NFR BLD: 15 % (ref 2–12)
NEUTROPHILS NFR BLD: 63 % (ref 43–80)
NEUTS SEG NFR BLD: 3.55 K/UL (ref 1.8–7.3)
NON-GYN CYTOLOGY REPORT: NORMAL
PATH REV BLD -IMP: NORMAL
PLATELET, FLUORESCENCE: 122 K/UL (ref 130–450)
PMV BLD AUTO: 11.1 FL (ref 7–12)
POTASSIUM SERPL-SCNC: 3.9 MMOL/L (ref 3.5–5)
PROT SERPL-MCNC: 6.8 G/DL (ref 6.4–8.3)
RBC # BLD AUTO: 2.29 M/UL (ref 3.8–5.8)
SODIUM SERPL-SCNC: 134 MMOL/L (ref 132–146)
VIT B12 SERPL-MCNC: 251 PG/ML (ref 211–946)
WBC OTHER # BLD: 5.6 K/UL (ref 4.5–11.5)

## 2024-04-19 PROCEDURE — 36430 TRANSFUSION BLD/BLD COMPNT: CPT

## 2024-04-19 PROCEDURE — 85018 HEMOGLOBIN: CPT

## 2024-04-19 PROCEDURE — 2580000003 HC RX 258: Performed by: FAMILY MEDICINE

## 2024-04-19 PROCEDURE — 99232 SBSQ HOSP IP/OBS MODERATE 35: CPT | Performed by: STUDENT IN AN ORGANIZED HEALTH CARE EDUCATION/TRAINING PROGRAM

## 2024-04-19 PROCEDURE — 80053 COMPREHEN METABOLIC PANEL: CPT

## 2024-04-19 PROCEDURE — 85014 HEMATOCRIT: CPT

## 2024-04-19 PROCEDURE — 99231 SBSQ HOSP IP/OBS SF/LOW 25: CPT | Performed by: INTERNAL MEDICINE

## 2024-04-19 PROCEDURE — 6370000000 HC RX 637 (ALT 250 FOR IP): Performed by: FAMILY MEDICINE

## 2024-04-19 PROCEDURE — 96367 TX/PROPH/DG ADDL SEQ IV INF: CPT

## 2024-04-19 PROCEDURE — P9016 RBC LEUKOCYTES REDUCED: HCPCS

## 2024-04-19 PROCEDURE — 82607 VITAMIN B-12: CPT

## 2024-04-19 PROCEDURE — 6370000000 HC RX 637 (ALT 250 FOR IP): Performed by: STUDENT IN AN ORGANIZED HEALTH CARE EDUCATION/TRAINING PROGRAM

## 2024-04-19 PROCEDURE — 2580000003 HC RX 258: Performed by: NURSE PRACTITIONER

## 2024-04-19 PROCEDURE — 30233N1 TRANSFUSION OF NONAUTOLOGOUS RED BLOOD CELLS INTO PERIPHERAL VEIN, PERCUTANEOUS APPROACH: ICD-10-PCS | Performed by: FAMILY MEDICINE

## 2024-04-19 PROCEDURE — 82746 ASSAY OF FOLIC ACID SERUM: CPT

## 2024-04-19 PROCEDURE — 1200000000 HC SEMI PRIVATE

## 2024-04-19 PROCEDURE — 85025 COMPLETE CBC W/AUTO DIFF WBC: CPT

## 2024-04-19 PROCEDURE — 6360000002 HC RX W HCPCS: Performed by: NURSE PRACTITIONER

## 2024-04-19 PROCEDURE — 36415 COLL VENOUS BLD VENIPUNCTURE: CPT

## 2024-04-19 PROCEDURE — 99232 SBSQ HOSP IP/OBS MODERATE 35: CPT | Performed by: NURSE PRACTITIONER

## 2024-04-19 RX ORDER — SUCRALFATE 1 G/1
1 TABLET ORAL EVERY 12 HOURS SCHEDULED
Status: DISCONTINUED | OUTPATIENT
Start: 2024-04-19 | End: 2024-04-20 | Stop reason: HOSPADM

## 2024-04-19 RX ORDER — SODIUM CHLORIDE 9 MG/ML
INJECTION, SOLUTION INTRAVENOUS PRN
Status: DISCONTINUED | OUTPATIENT
Start: 2024-04-19 | End: 2024-04-20 | Stop reason: HOSPADM

## 2024-04-19 RX ADMIN — SUCRALFATE 1 G: 1 TABLET ORAL at 13:00

## 2024-04-19 RX ADMIN — GABAPENTIN 100 MG: 100 CAPSULE ORAL at 09:17

## 2024-04-19 RX ADMIN — Medication 100 MG: at 09:17

## 2024-04-19 RX ADMIN — SODIUM CHLORIDE, PRESERVATIVE FREE 10 ML: 5 INJECTION INTRAVENOUS at 21:03

## 2024-04-19 RX ADMIN — SODIUM CHLORIDE, PRESERVATIVE FREE 10 ML: 5 INJECTION INTRAVENOUS at 09:17

## 2024-04-19 RX ADMIN — TAMSULOSIN HYDROCHLORIDE 0.4 MG: 0.4 CAPSULE ORAL at 09:17

## 2024-04-19 RX ADMIN — Medication 1 CAPSULE: at 09:17

## 2024-04-19 RX ADMIN — PANTOPRAZOLE SODIUM 40 MG: 40 TABLET, DELAYED RELEASE ORAL at 05:49

## 2024-04-19 RX ADMIN — SODIUM CHLORIDE 125 MG: 9 INJECTION, SOLUTION INTRAVENOUS at 14:48

## 2024-04-19 RX ADMIN — SUCRALFATE 1 G: 1 TABLET ORAL at 21:03

## 2024-04-19 RX ADMIN — FOLIC ACID 1 MG: 1 TABLET ORAL at 09:17

## 2024-04-19 RX ADMIN — MELATONIN 3 MG ORAL TABLET 3 MG: 3 TABLET ORAL at 21:03

## 2024-04-19 RX ADMIN — PANTOPRAZOLE SODIUM 40 MG: 40 TABLET, DELAYED RELEASE ORAL at 15:59

## 2024-04-19 ASSESSMENT — PAIN SCALES - GENERAL
PAINLEVEL_OUTOF10: 0

## 2024-04-19 NOTE — PROGRESS NOTES
Hospitalist Progress Note      SYNOPSIS: Patient admitted on 2024 for Atrial fibrillation (HCC)  68-year-old male past medical history of alcohol abuse, hematuria, paroxymal atrial fibrillation, prostate cancer, GI bleed requiring outpatient capsule endoscopy, coronary artery disease presents ED due to chest pain.He was found to be in atrial fibrillation with RVR on presentation with heart rate of 122. CT angiogram of the chest was negative for pulm embolism. Hemoglobin 9.2. Respiratory panel was negative. Urinalysis with no evidence of UTI, 6-9 RBCs and moderate hemoglobin present. Chest x-ray negative for any acute findings. Potassium of 3.4 and magnesium of 1.3. Troponin of 18. He was given IV fluids and oral potassium and IV magnesium replacement. He did mention that he had the capsule endoscopy and lower endoscopy as recommended and he was told by the GI doctor that there was no concerns.  He is unsure about cystoscopy.  Hemoglobin did drop to 7.1 given this morning of blood was ordered and stress test was held.  Echocardiogram ordered.  Cardiology following.  He also reported black stools for which surgery has been consulted and also urology given some blood in his urinalysis.   patient's hemoglobin dropped to 6.8, receiving 1 unit of packed RBC transfusion, hematology consulted    SUBJECTIVE:  Stable overnight. No other overnight issues reported.   Patient seen and examined at bedside today a.m. patient looks frustrated because of his further drop in hemoglobin, denies any history of hematuria or melena.  His chest pain has improved, rhythm has been regular  Records reviewed.         Temp (24hrs), Av.7 °F (36.5 °C), Min:97 °F (36.1 °C), Max:98.6 °F (37 °C)    DIET: ADULT DIET; Regular; Low Fat/Low Chol/High Fiber/TELLO  CODE: Full Code    Intake/Output Summary (Last 24 hours) at 2024 1104  Last data filed at 2024 1043  Gross per 24 hour   Intake 561 ml   Output 650 ml   Net -89 ml

## 2024-04-19 NOTE — PLAN OF CARE
Problem: Discharge Planning  Goal: Discharge to home or other facility with appropriate resources  4/19/2024 0838 by Monet Bailon RN  Outcome: Progressing  Flowsheets (Taken 4/19/2024 0750)  Discharge to home or other facility with appropriate resources: Identify barriers to discharge with patient and caregiver  4/18/2024 2331 by Mili Estevez RN  Outcome: Progressing     Problem: Safety - Adult  Goal: Free from fall injury  4/19/2024 0838 by Monet Bailon RN  Outcome: Progressing  Flowsheets (Taken 4/19/2024 0837)  Free From Fall Injury: Instruct family/caregiver on patient safety  4/18/2024 2331 by Mili Estevez RN  Outcome: Progressing     Problem: Pain  Goal: Verbalizes/displays adequate comfort level or baseline comfort level  4/19/2024 0838 by Monet Bailon RN  Outcome: Progressing  Flowsheets (Taken 4/19/2024 0651)  Verbalizes/displays adequate comfort level or baseline comfort level: Encourage patient to monitor pain and request assistance  4/18/2024 2331 by Mili Estevez RN  Outcome: Progressing

## 2024-04-19 NOTE — CONSULTS
[facility]  Hematology/Oncology  Consult      Patient Name: Travon Negrete  YOB: 1955  PCP: Daniel Cuello MD   Referring Provider:      Reason for Consultation:   Chief Complaint   Patient presents with    Chest Pain    Shortness of Breath     Pt states it started this morning. Was at PCP and they instructed him to come to ER>         History of Present Illness: This is a 68-year old male patient with a PMH of prostate adenocarcinoma s/p radiation in November of 2017 followed with [facility] now follows with [facility].  Unsure of recent follow-up.  He also has a PMH o HLD, alcohol dependence (sober for 47 years), CAD. He was hospitalized in November 2023 for hematuria, possible hemorrhagic cystitis. Urine cytology negative. He was seen by our service for concern for mets to pelvis on CT A/P showed thickening of the wall of the rectum diffusely and diffuse thickening of the bladder wall along with ascites. Abdominal US showed hepatic steatosis with gallbladder wall thickening. PSA was <0.01. GI was following for ascites with recommendations for paracentesis that was not completed during admission. He was admitted again in December 2024 for hematuria. He underwent a cystoscopy with no concern for malignancy.  Patient presented to the ED for evaluation of chest pain. He was found to be in Afib with RVR. CTA pulmonary was negative for pulm embolism. Cardiology following. ECHO showed EFT 60-65% with grade I diastolic dysfunction. Hgb was 9.2 on admission which dropped to 6.9 yesterday. 1 unit of pRBC's ordered. Today Hgb is 6.8 with 1 unit of pRBC's ordered. GI following with no repeat scans planned at this time. Awaiting hemoccult stool. Patient underwent a capsule endoscopy recently that was reportedly negative. He has had EGD and colonoscopy within the past year that were negative for bleeding .  Peripheral smear pending. Iron 76, TIBC 228, iron sat 33, ferritin 1,328 however if drawn  The lungs are without acute focal process.  There is no effusion or pneumothorax. The cardiomediastinal silhouette is without acute process. The osseous structures are without acute process.  Linear density in the right mid lung may represent atelectasis versus thickening of the minor fissure.     No acute process.         ASSESSMENT/PLAN :  68-year old male   - Hx  prostate adenocarcinoma s/p radiation in November of 2017 followed with [facility] now follows with [facility].  Unsure of recent follow-up.    - HLD, alcohol dependence (sober for 47 years), CAD. He was hospitalized in November 2023 for hematuria, possible hemorrhagic cystitis. Urine cytology negative.   - Seen by our service in November 2023 as above.    -  Chest pain.   - Afib with RVR.   - CTA pulmonary was negative for pulm embolism.   - Cardiology following. ECHO showed EF 60-65% with grade I diastolic dysfunction.   - Hgb was 9.2 on admission which dropped to 6.9 yesterday. 1 unit of pRBC's ordered. Today Hgb is 6.8 with 1 unit of pRBC's ordered.   - GI following with no repeat scans planned at this time. Awaiting hemoccult stool. Patient underwent a capsule endoscopy recently that was reportedly negative.   He has had EGD and colonoscopy within the past year that were negative for bleeding .  - Peripheral smear pending. Iron 76, TIBC 228, iron sat 33, ferritin 1,328 however if drawn after a transfusion will likely be altered. CMP unremarkable. LFT's with alk phos 133, bilirubin 1.7.      Attending addendum:  As above.  Patient with multifactorial anemia likely related to intermittent GI blood losses the fact that he is on anticoagulation with Eliquis for atrial fibrillation.  He had also folic acid deficiency in the past and his B12 and folate to be rechecked and will initiate folate supplements.  In view of prior pelvic radiation for prostate cancer he likely has decreased marrow reserve and underlying low-grade MDS cannot be also entirely

## 2024-04-19 NOTE — PROGRESS NOTES
Blood transfution initiated. Patient monitered for 15 minutes. VS rechecked and stable. Patient denies any reaction.

## 2024-04-19 NOTE — CARE COORDINATION
04/19/24 CM Update:  Pt admitted for Afib RVR Pt Hgb was 7.1 GI Hematology and Urology consulted Pt rec'd transfusion for Hgb of 6.8 today. Cardiology also following ECH shows EF of 60-65% Plan is to return home at discharge with no current needs expressed for discharge Wife will transport CM/SW to follow Electronically signed by Roly Hinton RN CM on 4/19/2024 at 3:50 PM    01-Apr-2016

## 2024-04-19 NOTE — PROGRESS NOTES
Cardiology notified of BP 88/61 hr 63. Metoprolol ordered but will hold due to BP. Eleanor Alcocer on call.

## 2024-04-19 NOTE — PROGRESS NOTES
Patient is very unstable and weak on his feet. Message placed to Dr. Aguilar. Orders given for pt/ot eval

## 2024-04-20 VITALS
SYSTOLIC BLOOD PRESSURE: 93 MMHG | RESPIRATION RATE: 15 BRPM | HEART RATE: 64 BPM | TEMPERATURE: 97.9 F | DIASTOLIC BLOOD PRESSURE: 67 MMHG | WEIGHT: 176 LBS | BODY MASS INDEX: 25.2 KG/M2 | HEIGHT: 70 IN | OXYGEN SATURATION: 99 %

## 2024-04-20 LAB
ABO/RH: NORMAL
ALBUMIN SERPL-MCNC: 3.8 G/DL (ref 3.5–5.2)
ALP SERPL-CCNC: 134 U/L (ref 40–129)
ALT SERPL-CCNC: 7 U/L (ref 0–40)
ANION GAP SERPL CALCULATED.3IONS-SCNC: 15 MMOL/L (ref 7–16)
ANTIBODY SCREEN: NEGATIVE
ARM BAND NUMBER: NORMAL
AST SERPL-CCNC: 30 U/L (ref 0–39)
BASOPHILS # BLD: 0.02 K/UL (ref 0–0.2)
BASOPHILS NFR BLD: 0 % (ref 0–2)
BILIRUB SERPL-MCNC: 1.3 MG/DL (ref 0–1.2)
BLOOD BANK BLOOD PRODUCT EXPIRATION DATE: NORMAL
BLOOD BANK BLOOD PRODUCT EXPIRATION DATE: NORMAL
BLOOD BANK DISPENSE STATUS: NORMAL
BLOOD BANK DISPENSE STATUS: NORMAL
BLOOD BANK ISBT PRODUCT BLOOD TYPE: 5100
BLOOD BANK ISBT PRODUCT BLOOD TYPE: 5100
BLOOD BANK PRODUCT CODE: NORMAL
BLOOD BANK PRODUCT CODE: NORMAL
BLOOD BANK SAMPLE EXPIRATION: NORMAL
BLOOD BANK UNIT TYPE AND RH: NORMAL
BLOOD BANK UNIT TYPE AND RH: NORMAL
BPU ID: NORMAL
BPU ID: NORMAL
BUN SERPL-MCNC: 19 MG/DL (ref 6–23)
CALCIUM SERPL-MCNC: 9.3 MG/DL (ref 8.6–10.2)
CHLORIDE SERPL-SCNC: 97 MMOL/L (ref 98–107)
CO2 SERPL-SCNC: 22 MMOL/L (ref 22–29)
COMPONENT: NORMAL
COMPONENT: NORMAL
CREAT SERPL-MCNC: 0.9 MG/DL (ref 0.7–1.2)
CROSSMATCH RESULT: NORMAL
CROSSMATCH RESULT: NORMAL
EOSINOPHIL # BLD: 0.1 K/UL (ref 0.05–0.5)
EOSINOPHILS RELATIVE PERCENT: 2 % (ref 0–6)
ERYTHROCYTE [DISTWIDTH] IN BLOOD BY AUTOMATED COUNT: 14.3 % (ref 11.5–15)
GFR SERPL CREATININE-BSD FRML MDRD: >90 ML/MIN/1.73M2
GLUCOSE SERPL-MCNC: 87 MG/DL (ref 74–99)
HCT VFR BLD AUTO: 23 % (ref 37–54)
HGB BLD-MCNC: 7.6 G/DL (ref 12.5–16.5)
IMM GRANULOCYTES # BLD AUTO: 0.05 K/UL (ref 0–0.58)
IMM GRANULOCYTES NFR BLD: 1 % (ref 0–5)
LYMPHOCYTES NFR BLD: 1.11 K/UL (ref 1.5–4)
LYMPHOCYTES RELATIVE PERCENT: 19 % (ref 20–42)
MCH RBC QN AUTO: 29.5 PG (ref 26–35)
MCHC RBC AUTO-ENTMCNC: 33 G/DL (ref 32–34.5)
MCV RBC AUTO: 89.1 FL (ref 80–99.9)
MONOCYTES NFR BLD: 1.05 K/UL (ref 0.1–0.95)
MONOCYTES NFR BLD: 18 % (ref 2–12)
NEUTROPHILS NFR BLD: 60 % (ref 43–80)
NEUTS SEG NFR BLD: 3.44 K/UL (ref 1.8–7.3)
PLATELET # BLD AUTO: 150 K/UL (ref 130–450)
PMV BLD AUTO: 11.1 FL (ref 7–12)
POTASSIUM SERPL-SCNC: 3.7 MMOL/L (ref 3.5–5)
PROT SERPL-MCNC: 6.7 G/DL (ref 6.4–8.3)
RBC # BLD AUTO: 2.58 M/UL (ref 3.8–5.8)
SODIUM SERPL-SCNC: 134 MMOL/L (ref 132–146)
TRANSFUSION STATUS: NORMAL
TRANSFUSION STATUS: NORMAL
UNIT DIVISION: 0
UNIT DIVISION: 0
UNIT ISSUE DATE/TIME: NORMAL
UNIT ISSUE DATE/TIME: NORMAL
WBC OTHER # BLD: 5.8 K/UL (ref 4.5–11.5)

## 2024-04-20 PROCEDURE — 36415 COLL VENOUS BLD VENIPUNCTURE: CPT

## 2024-04-20 PROCEDURE — 6370000000 HC RX 637 (ALT 250 FOR IP): Performed by: FAMILY MEDICINE

## 2024-04-20 PROCEDURE — 6370000000 HC RX 637 (ALT 250 FOR IP): Performed by: STUDENT IN AN ORGANIZED HEALTH CARE EDUCATION/TRAINING PROGRAM

## 2024-04-20 PROCEDURE — 85025 COMPLETE CBC W/AUTO DIFF WBC: CPT

## 2024-04-20 PROCEDURE — 99239 HOSP IP/OBS DSCHRG MGMT >30: CPT | Performed by: STUDENT IN AN ORGANIZED HEALTH CARE EDUCATION/TRAINING PROGRAM

## 2024-04-20 PROCEDURE — 88112 CYTOPATH CELL ENHANCE TECH: CPT

## 2024-04-20 PROCEDURE — 80053 COMPREHEN METABOLIC PANEL: CPT

## 2024-04-20 RX ORDER — PANTOPRAZOLE SODIUM 40 MG/1
40 TABLET, DELAYED RELEASE ORAL
Qty: 20 TABLET | Refills: 0 | Status: SHIPPED | OUTPATIENT
Start: 2024-04-20 | End: 2024-04-30

## 2024-04-20 RX ORDER — METOPROLOL SUCCINATE 50 MG/1
50 TABLET, EXTENDED RELEASE ORAL DAILY
Qty: 30 TABLET | Refills: 3 | Status: SHIPPED | OUTPATIENT
Start: 2024-04-21

## 2024-04-20 RX ORDER — SUCRALFATE 1 G/1
1 TABLET ORAL EVERY 12 HOURS SCHEDULED
Qty: 14 TABLET | Refills: 0 | Status: SHIPPED | OUTPATIENT
Start: 2024-04-20 | End: 2024-04-27

## 2024-04-20 RX ADMIN — TAMSULOSIN HYDROCHLORIDE 0.4 MG: 0.4 CAPSULE ORAL at 09:12

## 2024-04-20 RX ADMIN — Medication 1 CAPSULE: at 09:12

## 2024-04-20 RX ADMIN — SUCRALFATE 1 G: 1 TABLET ORAL at 09:12

## 2024-04-20 RX ADMIN — Medication 100 MG: at 09:12

## 2024-04-20 RX ADMIN — PANTOPRAZOLE SODIUM 40 MG: 40 TABLET, DELAYED RELEASE ORAL at 06:24

## 2024-04-20 RX ADMIN — FOLIC ACID 1 MG: 1 TABLET ORAL at 09:12

## 2024-04-20 ASSESSMENT — PAIN SCALES - GENERAL: PAINLEVEL_OUTOF10: 0

## 2024-04-20 NOTE — PLAN OF CARE
Problem: Discharge Planning  Goal: Discharge to home or other facility with appropriate resources  4/19/2024 2145 by Evelyn Ramos, RN  Outcome: Progressing     Problem: Safety - Adult  Goal: Free from fall injury  4/19/2024 2145 by Evelyn Ramos, RN  Outcome: Progressing     Problem: Pain  Goal: Verbalizes/displays adequate comfort level or baseline comfort level  4/19/2024 2145 by Evelyn Ramos, RN  Outcome: Progressing  Flowsheets (Taken 4/19/2024 1450 by Monet Bailon, RN)  Verbalizes/displays adequate comfort level or baseline comfort level: Encourage patient to monitor pain and request assistance     Problem: Cardiovascular - Adult  Goal: Maintains optimal cardiac output and hemodynamic stability  Outcome: Progressing     Problem: Genitourinary - Adult  Goal: Absence of urinary retention  Outcome: Progressing

## 2024-04-20 NOTE — DISCHARGE SUMMARY
Hospital Medicine Discharge Summary    Patient ID: Travon Negrete      Patient's PCP: Daniel Cuello MD    Admit Date: 4/16/2024     Discharge Date: 4/20/2024      Admitting Physician: Anthony Brody MD     Discharge Physician: Orlando Aguilar MD     Discharge Diagnoses:  Acute on chronic anemia, paroxysmal A-fib with RVR     Active Hospital Problems    Diagnosis Date Noted    Acute anemia [D64.9] 04/18/2024    Nonrheumatic mitral valve regurgitation [I34.0] 04/17/2024    Nonrheumatic aortic valve insufficiency [I35.1] 04/17/2024    Atrial fibrillation (HCC) [I48.91] 04/16/2024    Chest pain [R07.9] 04/16/2024    Dyspnea on exertion [R06.09] 04/16/2024    Atrial fibrillation with rapid ventricular response (HCC) [I48.91] 04/16/2024    Anemia [D64.9] 11/16/2023       The patient was seen and examined on day of discharge and this discharge summary is in conjunction with any daily progress note from day of discharge.    Hospital Course:   68-year-old male past medical history of alcohol abuse, hematuria, paroxymal atrial fibrillation, prostate cancer, GI bleed requiring outpatient capsule endoscopy, coronary artery disease presents ED due to chest pain.    He was found to be in atrial fibrillation with RVR on presentation with heart rate of 122.     CT angiogram of the chest was negative for pulm embolism. Hemoglobin 9.2. Respiratory panel was negative. Urinalysis with no evidence of UTI, 6-9 RBCs and moderate hemoglobin present. Chest x-ray negative for any acute findings. Potassium of 3.4 and magnesium of 1.3. Troponin of 18. He was given IV fluids and oral potassium and IV magnesium replacement. He did mention that he had the capsule endoscopy and lower endoscopy as recommended and he was told by the GI doctor that there was no concerns for GI bleed.  Hemoglobin dropped during his hospital stay received 2 unit of packed RBC transfusion.    Cardiology was consulted continued metoprolol, hold

## 2024-04-20 NOTE — CARE COORDINATION
Transition of care update: Discharge order noted. Plan is to return home with no needs identified. Pt's wife to provide transportation.

## 2024-04-20 NOTE — PLAN OF CARE
Problem: Discharge Planning  Goal: Discharge to home or other facility with appropriate resources  4/20/2024 1016 by Lois Oro RN  Outcome: Completed  4/19/2024 2146 by Evelyn Ramos RN  Outcome: Progressing  4/19/2024 2145 by Evelyn Ramos RN  Outcome: Progressing     Problem: Safety - Adult  Goal: Free from fall injury  4/20/2024 1016 by Lois Oro RN  Outcome: Completed  4/19/2024 2146 by Evelyn Ramos RN  Outcome: Progressing  4/19/2024 2145 by Evelyn Ramos RN  Outcome: Progressing     Problem: Pain  Goal: Verbalizes/displays adequate comfort level or baseline comfort level  4/20/2024 1016 by Lois rOo RN  Outcome: Completed  4/19/2024 2146 by Evelyn Ramos RN  Outcome: Progressing  4/19/2024 2145 by Evelyn Ramos RN  Outcome: Progressing  Flowsheets (Taken 4/19/2024 1450 by Monet Bailon RN)  Verbalizes/displays adequate comfort level or baseline comfort level: Encourage patient to monitor pain and request assistance     Problem: Cardiovascular - Adult  Goal: Maintains optimal cardiac output and hemodynamic stability  4/20/2024 1016 by Lois Oro RN  Outcome: Completed  4/19/2024 2146 by Evelyn Ramos RN  Outcome: Progressing  4/19/2024 2145 by Evelyn Ramos RN  Outcome: Progressing     Problem: Genitourinary - Adult  Goal: Absence of urinary retention  4/20/2024 1016 by Lois Oro RN  Outcome: Completed  4/19/2024 2146 by Evelyn Ramos RN  Outcome: Progressing  4/19/2024 2145 by Evelyn Ramos RN  Outcome: Progressing

## 2024-04-20 NOTE — PROGRESS NOTES
Patient is seen in follow-up for paroxysmal atrial fibrillation    Subjective:     Mr. Negrete feels better after blood transfusion  Sitting up in bed no apparent distress    ROS:  CONSTITUTIONAL:  negative for  fevers, chills  HEENT:  negative for earaches, nasal congestion and epistaxis  RESPIRATORY:  negative for  dry cough, cough with sputum,wheezing and hemoptysis  GASTROINTESTINAL:  negative for nausea, vomiting  MUSCULOSKELETAL:  negative for  myalgias, arthralgias  NEUROLOGICAL:  negative for visual disturbance, dysphagia    Medication side effects: none    Scheduled Meds:   sucralfate  1 g Oral 2 times per day    pantoprazole  40 mg Oral BID AC    potassium chloride  20 mEq Oral Once    metoprolol succinate  50 mg Oral Daily    folic acid  1 mg Oral Daily    [Held by provider] furosemide  20 mg Oral Daily    thiamine  100 mg Oral Daily    tamsulosin  0.4 mg Oral Daily    [Held by provider] losartan  100 mg Oral Daily    lactobacillus  1 capsule Oral Daily    sodium chloride flush  5-40 mL IntraVENous 2 times per day    melatonin  3 mg Oral Nightly     Continuous Infusions:   sodium chloride      sodium chloride      sodium chloride       PRN Meds:sodium chloride, sodium chloride, perflutren lipid microspheres, gabapentin, sodium chloride flush, sodium chloride, magnesium sulfate, ondansetron **OR** ondansetron, polyethylene glycol, acetaminophen **OR** acetaminophen    I/O last 3 completed shifts:  In: 991 [P.O.:720; Blood:271]  Out: 800 [Urine:800]  I/O this shift:  In: 125 [P.O.:120; I.V.:5]  Out: 300 [Urine:300]      Objective:      Physical Exam:   BP 97/62   Pulse 70   Temp 98.1 °F (36.7 °C) (Temporal)   Resp 14   Ht 1.778 m (5' 10\")   Wt 79.8 kg (176 lb)   SpO2 100%   BMI 25.25 kg/m²   CONSTITUTIONAL:  awake, alert, cooperative, no apparent distress, and appears stated age  HEAD:  normocepalic, without obvious abnormality, atraumatic  NECK:  Supple, symmetrical, trachea midline, no adenopathy,  using voice recognition software. Every effort was made to ensure accuracy; however, inadvertent computerized transcription errors may be present

## 2024-04-20 NOTE — PROGRESS NOTES
[facility]  Hematology/Oncology  Consult      Patient Name: Travon Negrete  YOB: 1955  PCP: Daniel Cuello MD   Referring Provider:      Reason for Consultation:   Chief Complaint   Patient presents with    Chest Pain    Shortness of Breath     Pt states it started this morning. Was at PCP and they instructed him to come to ER>         Subjective:  Improved. OK for DC    History of Present Illness: This is a 68-year old male patient with a PMH of prostate adenocarcinoma s/p radiation in November of 2017 followed with [facility] now follows with [facility].  Unsure of recent follow-up.  He also has a PMH o HLD, alcohol dependence (sober for 47 years), CAD. He was hospitalized in November 2023 for hematuria, possible hemorrhagic cystitis. Urine cytology negative. He was seen by our service for concern for mets to pelvis on CT A/P showed thickening of the wall of the rectum diffusely and diffuse thickening of the bladder wall along with ascites. Abdominal US showed hepatic steatosis with gallbladder wall thickening. PSA was <0.01. GI was following for ascites with recommendations for paracentesis that was not completed during admission. He was admitted again in December 2024 for hematuria. He underwent a cystoscopy with no concern for malignancy.  Patient presented to the ED for evaluation of chest pain. He was found to be in Afib with RVR. CTA pulmonary was negative for pulm embolism. Cardiology following. ECHO showed EFT 60-65% with grade I diastolic dysfunction. Hgb was 9.2 on admission which dropped to 6.9 yesterday. 1 unit of pRBC's ordered. Today Hgb is 6.8 with 1 unit of pRBC's ordered. GI following with no repeat scans planned at this time. Awaiting hemoccult stool. Patient underwent a capsule endoscopy recently that was reportedly negative. He has had EGD and colonoscopy within the past year that were negative for bleeding .  Peripheral smear pending. Iron 76, TIBC 228, iron sat  prostate cancer he likely has decreased marrow reserve and underlying low-grade MDS cannot be also entirely excluded.  Transfuse for hemoglobin below 7  Diagnostic bone marrow aspirate and biopsy will be recommended.  Specimen to be sent for flow cytometry and cytogenetics.    4/20/24  - CBC w/ WBC 5/8, Hgb 7.6, MCV 89, platelets 150  - Multifactorial anemia likely related to intermittent GI blood losses the fact that he is on anticoagulation with Eliquis for atrial fibrillation  - Folate abd B12 WNL (B12 ~LLN)  - Due to prior pelvic radiation for prostate cancer he likely has decreased marrow reserve and underlying low-grade MDS cannot be also entirely excluded  - Diagnostic bone marrow aspirate and biopsy will be recommended, will arrange outpt  - OK for DC from hem/onc POV (spoke with Lois directly this AM)      Christiano Bobby MD  Electronically signed 4/20/2024 at 9:42 AM

## 2024-04-20 NOTE — PROGRESS NOTES
Patient is seen in follow-up for paroxysmal atrial fibrillation    Subjective:     Mr. Negrete feels okay today  Sitting up in bed no apparent distress    ROS:  CONSTITUTIONAL:  negative for  fevers, chills  HEENT:  negative for earaches, nasal congestion and epistaxis  RESPIRATORY:  negative for  dry cough, cough with sputum,wheezing and hemoptysis  GASTROINTESTINAL:  negative for nausea, vomiting  MUSCULOSKELETAL:  negative for  myalgias, arthralgias  NEUROLOGICAL:  negative for visual disturbance, dysphagia    Medication side effects: none    Scheduled Meds:   sucralfate  1 g Oral 2 times per day    pantoprazole  40 mg Oral BID AC    potassium chloride  20 mEq Oral Once    metoprolol succinate  50 mg Oral Daily    folic acid  1 mg Oral Daily    [Held by provider] furosemide  20 mg Oral Daily    thiamine  100 mg Oral Daily    tamsulosin  0.4 mg Oral Daily    [Held by provider] losartan  100 mg Oral Daily    lactobacillus  1 capsule Oral Daily    sodium chloride flush  5-40 mL IntraVENous 2 times per day    melatonin  3 mg Oral Nightly     Continuous Infusions:   sodium chloride      sodium chloride      sodium chloride       PRN Meds:sodium chloride, sodium chloride, perflutren lipid microspheres, gabapentin, sodium chloride flush, sodium chloride, magnesium sulfate, ondansetron **OR** ondansetron, polyethylene glycol, acetaminophen **OR** acetaminophen    I/O last 3 completed shifts:  In: 991 [P.O.:720; Blood:271]  Out: 800 [Urine:800]  I/O this shift:  In: 125 [P.O.:120; I.V.:5]  Out: 300 [Urine:300]      Objective:      Physical Exam:   BP 97/62   Pulse 70   Temp 98.1 °F (36.7 °C) (Temporal)   Resp 14   Ht 1.778 m (5' 10\")   Wt 79.8 kg (176 lb)   SpO2 100%   BMI 25.25 kg/m²   CONSTITUTIONAL:  awake, alert, cooperative, no apparent distress, and appears stated age  HEAD:  normocepalic, without obvious abnormality, atraumatic  NECK:  Supple, symmetrical, trachea midline, no adenopathy, thyroid symmetric,  mechanical   obstructive process or obstructing bowel.  Intramural fat deposition could   represent prior inflammatory bowel findings without active inflammation   clearly evident.  No evidence for perforation or intra-abscess.   3. Enlarged, fatty liver.   4. Sigmoid diverticulosis without acute diverticulitis.         XR CHEST PORTABLE   Final Result   No acute process.             Lab Review   Lab Results   Component Value Date/Time     04/20/2024 03:39 AM    K 3.7 04/20/2024 03:39 AM    CL 97 04/20/2024 03:39 AM    CO2 22 04/20/2024 03:39 AM    BUN 19 04/20/2024 03:39 AM    CREATININE 0.9 04/20/2024 03:39 AM    GLUCOSE 87 04/20/2024 03:39 AM    CALCIUM 9.3 04/20/2024 03:39 AM     Lab Results   Component Value Date/Time    WBC 5.8 04/20/2024 03:39 AM    HGB 7.6 04/20/2024 03:39 AM    HCT 23.0 04/20/2024 03:39 AM    MCV 89.1 04/20/2024 03:39 AM     04/20/2024 03:39 AM     I have personally reviewed the laboratory, cardiac diagnostic and radiographic testing as outlined above:    Assessment:     Paroxysmal atrial fibrillation with RVR, spontaneous conversion to sinus rhythm  CAD: Nonobstructive  Tricuspid valve regurgitation: Mild  Hypertension: Controlled, hypotensive at times, suspect secondary to bleeding.   Tobacco abuse:   Hematuria  9.  Anemia and thrombocytopenia      Recommendations:     1.  Continue current treatment, hold Toprol for systolic blood pressure less than 100 mmHg  2.  Will transfuse to maintain hemoglobin more than 7 g/dL  3.  Not a candidate for anticoagulation due to hematuria and anemia  4.  No active general cardiology problems, will see as needed, thank you    Discussed with patient   Discussed with nursing staff  Electronically signed by Chris Rudd MD on 4/20/2024 at 6:05 AM  NOTE: This report was transcribed using voice recognition software. Every effort was made to ensure accuracy; however, inadvertent computerized transcription errors may be present

## 2024-04-20 NOTE — PROGRESS NOTES
Discharge instructions provided to patient regarding follow up appts/ testing as well as what to look for regarding anemia symptoms.   Discussed new medications and when to take next doses.   Advised to call  if had any concerns.

## 2024-04-22 NOTE — PROGRESS NOTES
CLINICAL PHARMACY NOTE: MEDS TO BEDS    Total # of Prescriptions Filled: 3   The following medications were delivered to the patient:  Metoprolol succinate er 50  Pantoprazole 40  Sucralfate 1gm    Additional Documentation:    Pt picked up in pharmacy

## 2024-04-23 ENCOUNTER — TELEPHONE (OUTPATIENT)
Dept: CARDIOLOGY CLINIC | Age: 69
End: 2024-04-23

## 2024-04-23 LAB — NON-GYN CYTOLOGY REPORT: NORMAL

## 2024-05-10 ENCOUNTER — TELEPHONE (OUTPATIENT)
Dept: CARDIOLOGY CLINIC | Age: 69
End: 2024-05-10

## 2024-05-10 NOTE — TELEPHONE ENCOUNTER
DR. ROSS APPT    I called Travon to notify him  that Dr. Rudd referred him or her to Dr. Roldan to discuss Watchman  Location: [facility].  [address] [place] [address].  [facility] & [facility].   Date & Time of Appt: 5/21/24 at 1:30 pm  Arrival Time: 1:00 pm  Pt instructed to bring insurance card, photo ID, & list of medications w/ him / her.

## 2024-05-21 ENCOUNTER — OFFICE VISIT (OUTPATIENT)
Dept: CARDIOLOGY CLINIC | Age: 69
End: 2024-05-21
Payer: MEDICARE

## 2024-05-21 VITALS
WEIGHT: 160.6 LBS | RESPIRATION RATE: 16 BRPM | HEART RATE: 63 BPM | SYSTOLIC BLOOD PRESSURE: 120 MMHG | BODY MASS INDEX: 22.48 KG/M2 | HEIGHT: 71 IN | DIASTOLIC BLOOD PRESSURE: 60 MMHG

## 2024-05-21 DIAGNOSIS — I48.0 PAROXYSMAL ATRIAL FIBRILLATION (HCC): Primary | ICD-10-CM

## 2024-05-21 PROCEDURE — G8427 DOCREV CUR MEDS BY ELIG CLIN: HCPCS | Performed by: INTERNAL MEDICINE

## 2024-05-21 PROCEDURE — 99205 OFFICE O/P NEW HI 60 MIN: CPT | Performed by: INTERNAL MEDICINE

## 2024-05-21 PROCEDURE — 4004F PT TOBACCO SCREEN RCVD TLK: CPT | Performed by: INTERNAL MEDICINE

## 2024-05-21 PROCEDURE — G8420 CALC BMI NORM PARAMETERS: HCPCS | Performed by: INTERNAL MEDICINE

## 2024-05-21 PROCEDURE — 3017F COLORECTAL CA SCREEN DOC REV: CPT | Performed by: INTERNAL MEDICINE

## 2024-05-21 PROCEDURE — 93000 ELECTROCARDIOGRAM COMPLETE: CPT | Performed by: INTERNAL MEDICINE

## 2024-05-21 PROCEDURE — 1123F ACP DISCUSS/DSCN MKR DOCD: CPT | Performed by: INTERNAL MEDICINE

## 2024-05-21 RX ORDER — FERROUS SULFATE 325(65) MG
325 TABLET ORAL
COMMUNITY

## 2024-05-21 RX ORDER — FUROSEMIDE 20 MG/1
20 TABLET ORAL 2 TIMES DAILY
COMMUNITY

## 2024-05-21 RX ORDER — POTASSIUM CHLORIDE 7.45 MG/ML
10 INJECTION INTRAVENOUS ONCE
COMMUNITY

## 2024-05-21 ASSESSMENT — ENCOUNTER SYMPTOMS
BLOOD IN STOOL: 0
CHEST TIGHTNESS: 0
ABDOMINAL PAIN: 0
SHORTNESS OF BREATH: 0
COUGH: 0
VOMITING: 0
BACK PAIN: 0
NAUSEA: 0

## 2024-05-21 NOTE — PROGRESS NOTES
OFFICE VISIT    NAME: Travon Negrete     YOB: 1955   AGE: 68 y.o.   MEDICAL RECORD NUMBER: [id number]       CONSULTATION INFORMATION:   DATE OF CLINIC CONSULTATION: 5/21/2024   PRINCIPLE DIAGNOSIS / reason for visit: A-fib, consideration for LAAO    CARE TEAM MEMBERS    PCP : Daniel Cuello MD     PRIMARY CARDIOLOGIST: Dr. Rudd   REFERRING PROVIDER: Dr. Rudd    HISTORY OF PRESENT ILLNESS:   Travon Negrete is a 68 y.o. male referred by Dr. Rudd for Afib and consideration for LAAO. Past medical history of paroxysmal A-fib (Dx 11/23, was deemed unsuitable for anticoagulation 2/2 anemia requiring RBC and hematuria), HTN, nonobstructive CAD, EtOH abuse, prostate cancer s/p radiation, anemia and recurrent hematuria (2/2 radiation cystitis), melena with colonoscopy showing colon polyps.    Patient was first diagnosed with A-fib in November 2023.  He reports that he had another episode of A-fib in April 2024.  He was never put on any anticoagulation due to anemia and recurrent hematuria.  Patient had outpatient evaluation for GI bleed with EGD, colonoscopy and capsule endoscopy with no signs of active bleeding or high risk lesions.  Heme-onc is following and recommending bone marrow aspirate for further evaluation of his anemia    CT abdomen pelvis showing thickening of the wall of rectum diffusely and diffuse thickening of the bladder wall along with ascites.    -TTE 4/17/2024:    Left Ventricle: Normal left ventricular systolic function with a visually estimated EF of 60 - 65%. Left ventricle size is normal. Mildly increased wall thickness. Normal wall motion. Grade I diastolic dysfunction with normal LAP.    Aortic Valve: Trileaflet valve.    Left Atrium: Left atrium is mildly dilated.    Image quality is good.    -ECG 5/21/2024: Normal sinus rhythm, nonspecific ST-T wave changes.        PAST HISTORY:   History reviewed. No pertinent past medical history.    Past Surgical History:   Procedure

## 2024-05-23 PROBLEM — K70.30 ALCOHOLIC CIRRHOSIS OF LIVER WITHOUT ASCITES (HCC): Status: ACTIVE | Noted: 2024-05-23

## 2024-05-28 DIAGNOSIS — Z91.89 AT HIGH RISK FOR BLEEDING: ICD-10-CM

## 2024-05-28 DIAGNOSIS — I48.0 PAF (PAROXYSMAL ATRIAL FIBRILLATION) (HCC): Primary | ICD-10-CM

## 2024-05-30 ENCOUNTER — TELEPHONE (OUTPATIENT)
Dept: CARDIOLOGY CLINIC | Age: 69
End: 2024-05-30

## 2024-05-30 DIAGNOSIS — Z91.89 AT HIGH RISK FOR BLEEDING: ICD-10-CM

## 2024-05-30 DIAGNOSIS — I48.0 PAF (PAROXYSMAL ATRIAL FIBRILLATION) (HCC): Primary | ICD-10-CM

## 2024-05-30 NOTE — TELEPHONE ENCOUNTER
WATCHMAN CTA INSTRUCTIONS:    I called Travon to go over his Watchman CTA Instructions  Location: [facility]  Date & Time: 6/13/24 at 9:00 am  NPO after:  5:00 am  Arrival Time: 6:30 am for lab work and pre-hydration  Hold Diuretics: Lasix  More detailed instructions mailed to patient & emailed to [email or web address]

## 2024-06-13 ENCOUNTER — HOSPITAL ENCOUNTER (OUTPATIENT)
Age: 69
Discharge: HOME | End: 2024-06-13
Attending: INTERNAL MEDICINE
Payer: MEDICARE

## 2024-06-13 ENCOUNTER — HOSPITAL ENCOUNTER (OUTPATIENT)
Dept: CT IMAGING | Age: 69
Discharge: HOME | End: 2024-06-15
Attending: INTERNAL MEDICINE
Payer: MEDICARE

## 2024-06-13 DIAGNOSIS — Z91.89 AT HIGH RISK FOR BLEEDING: ICD-10-CM

## 2024-06-13 DIAGNOSIS — I48.0 PAF (PAROXYSMAL ATRIAL FIBRILLATION) (HCC): ICD-10-CM

## 2024-06-13 LAB
ANION GAP SERPL CALCULATED.3IONS-SCNC: 18 MMOL/L (ref 7–16)
BUN SERPL-MCNC: 28 MG/DL (ref 6–23)
CALCIUM SERPL-MCNC: 10 MG/DL (ref 8.6–10.2)
CHLORIDE SERPL-SCNC: 97 MMOL/L (ref 98–107)
CO2 SERPL-SCNC: 25 MMOL/L (ref 22–29)
CREAT SERPL-MCNC: 0.9 MG/DL (ref 0.7–1.2)
GFR, ESTIMATED: >90 ML/MIN/1.73M2
GLUCOSE SERPL-MCNC: 112 MG/DL (ref 74–99)
POTASSIUM SERPL-SCNC: 3.3 MMOL/L (ref 3.5–5)
SODIUM SERPL-SCNC: 140 MMOL/L (ref 132–146)

## 2024-06-13 PROCEDURE — 6360000004 HC RX CONTRAST MEDICATION: Performed by: RADIOLOGY

## 2024-06-13 PROCEDURE — 75572 CT HRT W/3D IMAGE: CPT

## 2024-06-13 PROCEDURE — 80048 BASIC METABOLIC PNL TOTAL CA: CPT

## 2024-06-13 PROCEDURE — 2580000003 HC RX 258: Performed by: INTERNAL MEDICINE

## 2024-06-13 PROCEDURE — 36415 COLL VENOUS BLD VENIPUNCTURE: CPT

## 2024-06-13 RX ORDER — 0.9 % SODIUM CHLORIDE 0.9 %
500 INTRAVENOUS SOLUTION INTRAVENOUS ONCE
Status: DISCONTINUED | OUTPATIENT
Start: 2024-06-13 | End: 2024-06-13 | Stop reason: ALTCHOICE

## 2024-06-13 RX ORDER — SODIUM CHLORIDE 0.9 % (FLUSH) 0.9 %
10 SYRINGE (ML) INJECTION
Status: ACTIVE | OUTPATIENT
Start: 2024-06-13 | End: 2024-06-14

## 2024-06-13 RX ADMIN — SODIUM CHLORIDE 500 ML: 9 INJECTION, SOLUTION INTRAVENOUS at 08:59

## 2024-06-13 RX ADMIN — IOPAMIDOL 70 ML: 755 INJECTION, SOLUTION INTRAVENOUS at 10:00

## 2024-06-19 ENCOUNTER — TELEPHONE (OUTPATIENT)
Dept: CARDIOLOGY CLINIC | Age: 69
End: 2024-06-19

## 2024-06-19 NOTE — TELEPHONE ENCOUNTER
Yesterday, I called Dr. Fong's office to see if it would be ok for Mr. Negrete to be placed an DAPT for 6 months post Watchman d/t his h/o radiation induced hemorrhagic cystitis.    Today, Sweta, from Dr. Champagne's office called me back to inform me that Dr. Jose Mcdonald said that 6 months of DAPT post Watchman should not be a problem.

## 2024-07-04 DIAGNOSIS — I48.0 PAF (PAROXYSMAL ATRIAL FIBRILLATION) (HCC): Primary | ICD-10-CM

## 2024-07-04 DIAGNOSIS — Z01.818 PREOP TESTING: ICD-10-CM

## 2024-07-04 DIAGNOSIS — Z91.89 AT HIGH RISK FOR BLEEDING: ICD-10-CM

## 2024-07-08 ENCOUNTER — TELEPHONE (OUTPATIENT)
Dept: CARDIOLOGY CLINIC | Age: 69
End: 2024-07-08

## 2024-07-08 NOTE — TELEPHONE ENCOUNTER
WATCHMAN PREOP INSTRUCTIONS     I called Travon & reviewed the Preop Watchman Instructions   Preop Labs to be drawn a few days before at a [facility]   NPO post midnight the night before the procedure   Date & Time of Watchman procedure: 7/15/24 at 12:00 pm  Arrival Time: 10:00 am  Diabetic Med Instructions: N/A  Diuretic Instructions: Hold Lasix am of procedure  More detailed instructions mailed to pt, emailed to pt at [email or web address] ,and scanned into Epic.         Preop Modified [place] Scale:     For the purpose of the NCDR LAAO Registry, I conducted the  Preop Modified [place] Scale over the phone.  The results are below:  No symptoms at all

## 2024-07-08 NOTE — TELEPHONE ENCOUNTER
[place] DECISION [place]    Dr. Cuello signed the Watchman Shared Decision Making Form.  They will be scanned into media    Shared Decision Making Tool    Mr. Negrete  was given the [facility] CardioSmart Decision Aid for AFib Stroke Prevention for Patients with Atrial fibrillation during his  her visit yovana/ Omar on 5/21/24..

## 2024-07-12 ENCOUNTER — HOSPITAL ENCOUNTER (OUTPATIENT)
Age: 69
Discharge: HOME | End: 2024-07-12
Payer: MEDICARE

## 2024-07-12 DIAGNOSIS — Z01.818 PREOP TESTING: ICD-10-CM

## 2024-07-12 DIAGNOSIS — I48.0 PAF (PAROXYSMAL ATRIAL FIBRILLATION) (HCC): ICD-10-CM

## 2024-07-12 DIAGNOSIS — Z91.89 AT HIGH RISK FOR BLEEDING: ICD-10-CM

## 2024-07-12 LAB
ANION GAP SERPL CALCULATED.3IONS-SCNC: 22 MMOL/L (ref 7–16)
BASOPHILS # BLD: 0.04 K/UL (ref 0–0.2)
BASOPHILS NFR BLD: 1 % (ref 0–2)
BUN SERPL-MCNC: 14 MG/DL (ref 6–23)
CALCIUM SERPL-MCNC: 9.4 MG/DL (ref 8.6–10.2)
CHLORIDE SERPL-SCNC: 102 MMOL/L (ref 98–107)
CO2 SERPL-SCNC: 19 MMOL/L (ref 22–29)
CREAT SERPL-MCNC: 0.7 MG/DL (ref 0.7–1.2)
EOSINOPHIL # BLD: 0.12 K/UL (ref 0.05–0.5)
EOSINOPHILS RELATIVE PERCENT: 2 % (ref 0–6)
ERYTHROCYTE [DISTWIDTH] IN BLOOD BY AUTOMATED COUNT: 15.3 % (ref 11.5–15)
GFR, ESTIMATED: >90 ML/MIN/1.73M2
GLUCOSE SERPL-MCNC: 104 MG/DL (ref 74–99)
HCT VFR BLD AUTO: 30.4 % (ref 37–54)
HGB BLD-MCNC: 9.8 G/DL (ref 12.5–16.5)
IMM GRANULOCYTES # BLD AUTO: <0.03 K/UL (ref 0–0.58)
IMM GRANULOCYTES NFR BLD: 0 % (ref 0–5)
INR PPP: 1
LYMPHOCYTES NFR BLD: 1.13 K/UL (ref 1.5–4)
LYMPHOCYTES RELATIVE PERCENT: 23 % (ref 20–42)
MCH RBC QN AUTO: 29.9 PG (ref 26–35)
MCHC RBC AUTO-ENTMCNC: 32.2 G/DL (ref 32–34.5)
MCV RBC AUTO: 92.7 FL (ref 80–99.9)
MONOCYTES NFR BLD: 0.6 K/UL (ref 0.1–0.95)
MONOCYTES NFR BLD: 12 % (ref 2–12)
NEUTROPHILS NFR BLD: 61 % (ref 43–80)
NEUTS SEG NFR BLD: 3.02 K/UL (ref 1.8–7.3)
PARTIAL THROMBOPLASTIN TIME: 34.6 SEC (ref 24.5–35.1)
PLATELET # BLD AUTO: 112 K/UL (ref 130–450)
PMV BLD AUTO: 10.4 FL (ref 7–12)
POTASSIUM SERPL-SCNC: 3.2 MMOL/L (ref 3.5–5)
PROTHROMBIN TIME: 11.3 SEC (ref 9.3–12.4)
RBC # BLD AUTO: 3.28 M/UL (ref 3.8–5.8)
SODIUM SERPL-SCNC: 143 MMOL/L (ref 132–146)
WBC OTHER # BLD: 4.9 K/UL (ref 4.5–11.5)

## 2024-07-12 PROCEDURE — 36415 COLL VENOUS BLD VENIPUNCTURE: CPT

## 2024-07-12 PROCEDURE — 85730 THROMBOPLASTIN TIME PARTIAL: CPT

## 2024-07-12 PROCEDURE — 85610 PROTHROMBIN TIME: CPT

## 2024-07-12 PROCEDURE — 80048 BASIC METABOLIC PNL TOTAL CA: CPT

## 2024-07-12 PROCEDURE — 85025 COMPLETE CBC W/AUTO DIFF WBC: CPT

## 2024-07-15 ENCOUNTER — ANESTHESIA (OUTPATIENT)
Age: 69
End: 2024-07-15
Payer: MEDICARE

## 2024-07-15 ENCOUNTER — HOSPITAL ENCOUNTER (INPATIENT)
Age: 69
LOS: 1 days | Discharge: HOME | End: 2024-07-16
Attending: INTERNAL MEDICINE | Admitting: INTERNAL MEDICINE
Payer: MEDICARE

## 2024-07-15 ENCOUNTER — APPOINTMENT (OUTPATIENT)
Age: 69
End: 2024-07-15
Attending: INTERNAL MEDICINE
Payer: MEDICARE

## 2024-07-15 ENCOUNTER — ANESTHESIA EVENT (OUTPATIENT)
Age: 69
End: 2024-07-15
Payer: MEDICARE

## 2024-07-15 DIAGNOSIS — I48.0 PAROXYSMAL ATRIAL FIBRILLATION (HCC): ICD-10-CM

## 2024-07-15 DIAGNOSIS — Z95.818 PRESENCE OF WATCHMAN LEFT ATRIAL APPENDAGE CLOSURE DEVICE: Primary | ICD-10-CM

## 2024-07-15 DIAGNOSIS — I48.0 PAF (PAROXYSMAL ATRIAL FIBRILLATION) (HCC): Primary | ICD-10-CM

## 2024-07-15 DIAGNOSIS — Z95.818 PRESENCE OF WATCHMAN LEFT ATRIAL APPENDAGE CLOSURE DEVICE: ICD-10-CM

## 2024-07-15 LAB
ABO + RH BLD: NORMAL
ACTIVATED CLOTTING TIME, LOW RANGE: 159 SEC
ACTIVATED CLOTTING TIME, LOW RANGE: 337 SEC
ACTIVATED CLOTTING TIME, LOW RANGE: 351 SEC
ANION GAP SERPL CALCULATED.3IONS-SCNC: 18 MMOL/L (ref 7–16)
ARM BAND NUMBER: NORMAL
BLOOD BANK SAMPLE EXPIRATION: NORMAL
BLOOD GROUP ANTIBODIES SERPL: NEGATIVE
BUN SERPL-MCNC: 8 MG/DL (ref 6–23)
CALCIUM SERPL-MCNC: 9.5 MG/DL (ref 8.6–10.2)
CHLORIDE SERPL-SCNC: 97 MMOL/L (ref 98–107)
CO2 SERPL-SCNC: 25 MMOL/L (ref 22–29)
CREAT SERPL-MCNC: 0.6 MG/DL (ref 0.7–1.2)
ECHO BSA: 1.92 M2
ECHO BSA: 1.92 M2
GFR, ESTIMATED: >90 ML/MIN/1.73M2
GLUCOSE SERPL-MCNC: 103 MG/DL (ref 74–99)
POTASSIUM SERPL-SCNC: 3.5 MMOL/L (ref 3.5–5)
SODIUM SERPL-SCNC: 140 MMOL/L (ref 132–146)

## 2024-07-15 PROCEDURE — 2500000003 HC RX 250 WO HCPCS: Performed by: INTERNAL MEDICINE

## 2024-07-15 PROCEDURE — C1893 INTRO/SHEATH, FIXED,NON-PEEL: HCPCS | Performed by: INTERNAL MEDICINE

## 2024-07-15 PROCEDURE — B245ZZ4 ULTRASONOGRAPHY OF LEFT HEART, TRANSESOPHAGEAL: ICD-10-PCS | Performed by: INTERNAL MEDICINE

## 2024-07-15 PROCEDURE — 6370000000 HC RX 637 (ALT 250 FOR IP): Performed by: INTERNAL MEDICINE

## 2024-07-15 PROCEDURE — 2580000003 HC RX 258: Performed by: INTERNAL MEDICINE

## 2024-07-15 PROCEDURE — 6360000002 HC RX W HCPCS

## 2024-07-15 PROCEDURE — 33340 PERQ CLSR TCAT L ATR APNDGE: CPT | Performed by: INTERNAL MEDICINE

## 2024-07-15 PROCEDURE — 02L73DK OCCLUSION OF LEFT ATRIAL APPENDAGE WITH INTRALUMINAL DEVICE, PERCUTANEOUS APPROACH: ICD-10-PCS | Performed by: INTERNAL MEDICINE

## 2024-07-15 PROCEDURE — 7100000001 HC PACU RECOVERY - ADDTL 15 MIN: Performed by: INTERNAL MEDICINE

## 2024-07-15 PROCEDURE — 2140000000 HC CCU INTERMEDIATE R&B

## 2024-07-15 PROCEDURE — 93312 ECHO TRANSESOPHAGEAL: CPT | Performed by: INTERNAL MEDICINE

## 2024-07-15 PROCEDURE — 6360000002 HC RX W HCPCS: Performed by: NURSE ANESTHETIST, CERTIFIED REGISTERED

## 2024-07-15 PROCEDURE — 6360000004 HC RX CONTRAST MEDICATION: Performed by: INTERNAL MEDICINE

## 2024-07-15 PROCEDURE — 85347 COAGULATION TIME ACTIVATED: CPT

## 2024-07-15 PROCEDURE — C1760 CLOSURE DEV, VASC: HCPCS | Performed by: INTERNAL MEDICINE

## 2024-07-15 PROCEDURE — 7100000000 HC PACU RECOVERY - FIRST 15 MIN: Performed by: INTERNAL MEDICINE

## 2024-07-15 PROCEDURE — 2709999900 HC NON-CHARGEABLE SUPPLY: Performed by: INTERNAL MEDICINE

## 2024-07-15 PROCEDURE — 3700000000 HC ANESTHESIA ATTENDED CARE: Performed by: INTERNAL MEDICINE

## 2024-07-15 PROCEDURE — 3700000001 HC ADD 15 MINUTES (ANESTHESIA): Performed by: INTERNAL MEDICINE

## 2024-07-15 PROCEDURE — 93312 ECHO TRANSESOPHAGEAL: CPT

## 2024-07-15 PROCEDURE — 6360000002 HC RX W HCPCS: Performed by: INTERNAL MEDICINE

## 2024-07-15 PROCEDURE — 2500000003 HC RX 250 WO HCPCS

## 2024-07-15 PROCEDURE — 86901 BLOOD TYPING SEROLOGIC RH(D): CPT

## 2024-07-15 PROCEDURE — 86900 BLOOD TYPING SEROLOGIC ABO: CPT

## 2024-07-15 PROCEDURE — C1889 IMPLANT/INSERT DEVICE, NOC: HCPCS | Performed by: INTERNAL MEDICINE

## 2024-07-15 PROCEDURE — C1894 INTRO/SHEATH, NON-LASER: HCPCS | Performed by: INTERNAL MEDICINE

## 2024-07-15 PROCEDURE — 86850 RBC ANTIBODY SCREEN: CPT

## 2024-07-15 PROCEDURE — 2500000003 HC RX 250 WO HCPCS: Performed by: NURSE ANESTHETIST, CERTIFIED REGISTERED

## 2024-07-15 PROCEDURE — 80048 BASIC METABOLIC PNL TOTAL CA: CPT

## 2024-07-15 PROCEDURE — C1769 GUIDE WIRE: HCPCS | Performed by: INTERNAL MEDICINE

## 2024-07-15 PROCEDURE — 93319 3D ECHO IMG CGEN CAR ANOMAL: CPT | Performed by: INTERNAL MEDICINE

## 2024-07-15 DEVICE — LEFT ATRIAL APPENDAGE CLOSURE DEVICE WITH DELIVERY SYSTEM
Type: IMPLANTABLE DEVICE | Status: FUNCTIONAL
Brand: WATCHMAN FLX™ PRO

## 2024-07-15 RX ORDER — HYDRALAZINE HYDROCHLORIDE 20 MG/ML
5 INJECTION INTRAMUSCULAR; INTRAVENOUS
Status: DISCONTINUED | OUTPATIENT
Start: 2024-07-15 | End: 2024-07-15 | Stop reason: HOSPADM

## 2024-07-15 RX ORDER — LANOLIN ALCOHOL/MO/W.PET/CERES
100 CREAM (GRAM) TOPICAL DAILY
Status: DISCONTINUED | OUTPATIENT
Start: 2024-07-15 | End: 2024-07-16 | Stop reason: HOSPADM

## 2024-07-15 RX ORDER — HYDROMORPHONE HYDROCHLORIDE 1 MG/ML
0.5 INJECTION, SOLUTION INTRAMUSCULAR; INTRAVENOUS; SUBCUTANEOUS EVERY 5 MIN PRN
Status: DISCONTINUED | OUTPATIENT
Start: 2024-07-15 | End: 2024-07-15 | Stop reason: HOSPADM

## 2024-07-15 RX ORDER — FENTANYL CITRATE 50 UG/ML
INJECTION, SOLUTION INTRAMUSCULAR; INTRAVENOUS PRN
Status: DISCONTINUED | OUTPATIENT
Start: 2024-07-15 | End: 2024-07-15 | Stop reason: SDUPTHER

## 2024-07-15 RX ORDER — GABAPENTIN 300 MG/1
300 CAPSULE ORAL EVERY 12 HOURS PRN
Status: DISCONTINUED | OUTPATIENT
Start: 2024-07-15 | End: 2024-07-16 | Stop reason: HOSPADM

## 2024-07-15 RX ORDER — PROPOFOL 10 MG/ML
INJECTION, EMULSION INTRAVENOUS PRN
Status: DISCONTINUED | OUTPATIENT
Start: 2024-07-15 | End: 2024-07-15 | Stop reason: SDUPTHER

## 2024-07-15 RX ORDER — CLOPIDOGREL BISULFATE 75 MG/1
75 TABLET ORAL DAILY
Status: DISCONTINUED | OUTPATIENT
Start: 2024-07-16 | End: 2024-07-16 | Stop reason: HOSPADM

## 2024-07-15 RX ORDER — MEPERIDINE HYDROCHLORIDE 25 MG/ML
12.5 INJECTION INTRAMUSCULAR; INTRAVENOUS; SUBCUTANEOUS EVERY 5 MIN PRN
Status: DISCONTINUED | OUTPATIENT
Start: 2024-07-15 | End: 2024-07-15 | Stop reason: HOSPADM

## 2024-07-15 RX ORDER — NALOXONE HYDROCHLORIDE 0.4 MG/ML
INJECTION, SOLUTION INTRAMUSCULAR; INTRAVENOUS; SUBCUTANEOUS PRN
Status: DISCONTINUED | OUTPATIENT
Start: 2024-07-15 | End: 2024-07-15 | Stop reason: HOSPADM

## 2024-07-15 RX ORDER — ASPIRIN 325 MG
325 TABLET ORAL ONCE
Status: COMPLETED | OUTPATIENT
Start: 2024-07-15 | End: 2024-07-15

## 2024-07-15 RX ORDER — ROCURONIUM BROMIDE 10 MG/ML
INJECTION, SOLUTION INTRAVENOUS PRN
Status: DISCONTINUED | OUTPATIENT
Start: 2024-07-15 | End: 2024-07-15 | Stop reason: SDUPTHER

## 2024-07-15 RX ORDER — PROTAMINE SULFATE 10 MG/ML
INJECTION, SOLUTION INTRAVENOUS PRN
Status: DISCONTINUED | OUTPATIENT
Start: 2024-07-15 | End: 2024-07-15 | Stop reason: SDUPTHER

## 2024-07-15 RX ORDER — PANTOPRAZOLE SODIUM 40 MG/1
40 TABLET, DELAYED RELEASE ORAL
Status: DISCONTINUED | OUTPATIENT
Start: 2024-07-15 | End: 2024-07-16 | Stop reason: HOSPADM

## 2024-07-15 RX ORDER — SODIUM CHLORIDE 9 MG/ML
INJECTION, SOLUTION INTRAVENOUS PRN
Status: DISCONTINUED | OUTPATIENT
Start: 2024-07-15 | End: 2024-07-16 | Stop reason: HOSPADM

## 2024-07-15 RX ORDER — ACETAMINOPHEN 325 MG/1
650 TABLET ORAL EVERY 4 HOURS PRN
Status: DISCONTINUED | OUTPATIENT
Start: 2024-07-15 | End: 2024-07-16 | Stop reason: HOSPADM

## 2024-07-15 RX ORDER — LABETALOL HYDROCHLORIDE 5 MG/ML
5 INJECTION, SOLUTION INTRAVENOUS
Status: DISCONTINUED | OUTPATIENT
Start: 2024-07-15 | End: 2024-07-15 | Stop reason: HOSPADM

## 2024-07-15 RX ORDER — SODIUM CHLORIDE 0.9 % (FLUSH) 0.9 %
5-40 SYRINGE (ML) INJECTION EVERY 12 HOURS SCHEDULED
Status: DISCONTINUED | OUTPATIENT
Start: 2024-07-15 | End: 2024-07-16 | Stop reason: HOSPADM

## 2024-07-15 RX ORDER — SODIUM CHLORIDE 0.9 % (FLUSH) 0.9 %
5-40 SYRINGE (ML) INJECTION PRN
Status: DISCONTINUED | OUTPATIENT
Start: 2024-07-15 | End: 2024-07-15 | Stop reason: HOSPADM

## 2024-07-15 RX ORDER — LACTOBACILLUS RHAMNOSUS GG 10B CELL
1 CAPSULE ORAL DAILY
Status: DISCONTINUED | OUTPATIENT
Start: 2024-07-15 | End: 2024-07-16 | Stop reason: HOSPADM

## 2024-07-15 RX ORDER — MIDAZOLAM HYDROCHLORIDE 2 MG/2ML
2 INJECTION, SOLUTION INTRAMUSCULAR; INTRAVENOUS
Status: DISCONTINUED | OUTPATIENT
Start: 2024-07-15 | End: 2024-07-15 | Stop reason: HOSPADM

## 2024-07-15 RX ORDER — ENOXAPARIN SODIUM 100 MG/ML
40 INJECTION SUBCUTANEOUS DAILY
Status: DISCONTINUED | OUTPATIENT
Start: 2024-07-15 | End: 2024-07-16 | Stop reason: HOSPADM

## 2024-07-15 RX ORDER — FERROUS SULFATE 325(65) MG
325 TABLET ORAL
Status: DISCONTINUED | OUTPATIENT
Start: 2024-07-16 | End: 2024-07-16 | Stop reason: HOSPADM

## 2024-07-15 RX ORDER — PHENYLEPHRINE HYDROCHLORIDE 10 MG/ML
INJECTION INTRAVENOUS PRN
Status: DISCONTINUED | OUTPATIENT
Start: 2024-07-15 | End: 2024-07-15 | Stop reason: SDUPTHER

## 2024-07-15 RX ORDER — METOPROLOL SUCCINATE 25 MG/1
50 TABLET, EXTENDED RELEASE ORAL DAILY
Status: DISCONTINUED | OUTPATIENT
Start: 2024-07-15 | End: 2024-07-16 | Stop reason: HOSPADM

## 2024-07-15 RX ORDER — ACETAMINOPHEN 325 MG/1
650 TABLET ORAL
Status: DISCONTINUED | OUTPATIENT
Start: 2024-07-15 | End: 2024-07-15 | Stop reason: HOSPADM

## 2024-07-15 RX ORDER — DEXAMETHASONE SODIUM PHOSPHATE 10 MG/ML
INJECTION, EMULSION INTRAMUSCULAR; INTRAVENOUS PRN
Status: DISCONTINUED | OUTPATIENT
Start: 2024-07-15 | End: 2024-07-15 | Stop reason: SDUPTHER

## 2024-07-15 RX ORDER — DIPHENHYDRAMINE HYDROCHLORIDE 50 MG/ML
12.5 INJECTION INTRAMUSCULAR; INTRAVENOUS
Status: DISCONTINUED | OUTPATIENT
Start: 2024-07-15 | End: 2024-07-15 | Stop reason: HOSPADM

## 2024-07-15 RX ORDER — HYDROMORPHONE HYDROCHLORIDE 1 MG/ML
0.25 INJECTION, SOLUTION INTRAMUSCULAR; INTRAVENOUS; SUBCUTANEOUS EVERY 5 MIN PRN
Status: DISCONTINUED | OUTPATIENT
Start: 2024-07-15 | End: 2024-07-15 | Stop reason: HOSPADM

## 2024-07-15 RX ORDER — GLYCOPYRROLATE 0.2 MG/ML
INJECTION INTRAMUSCULAR; INTRAVENOUS PRN
Status: DISCONTINUED | OUTPATIENT
Start: 2024-07-15 | End: 2024-07-15 | Stop reason: SDUPTHER

## 2024-07-15 RX ORDER — ASPIRIN 81 MG/1
81 TABLET, CHEWABLE ORAL DAILY
Status: DISCONTINUED | OUTPATIENT
Start: 2024-07-16 | End: 2024-07-16 | Stop reason: HOSPADM

## 2024-07-15 RX ORDER — SODIUM CHLORIDE 9 MG/ML
INJECTION, SOLUTION INTRAVENOUS PRN
Status: DISCONTINUED | OUTPATIENT
Start: 2024-07-15 | End: 2024-07-15 | Stop reason: HOSPADM

## 2024-07-15 RX ORDER — FUROSEMIDE 20 MG/1
20 TABLET ORAL 2 TIMES DAILY
Status: DISCONTINUED | OUTPATIENT
Start: 2024-07-15 | End: 2024-07-16 | Stop reason: HOSPADM

## 2024-07-15 RX ORDER — SODIUM CHLORIDE 0.9 % (FLUSH) 0.9 %
5-40 SYRINGE (ML) INJECTION EVERY 12 HOURS SCHEDULED
Status: DISCONTINUED | OUTPATIENT
Start: 2024-07-15 | End: 2024-07-15 | Stop reason: HOSPADM

## 2024-07-15 RX ORDER — ONDANSETRON 2 MG/ML
4 INJECTION INTRAMUSCULAR; INTRAVENOUS
Status: DISCONTINUED | OUTPATIENT
Start: 2024-07-15 | End: 2024-07-15 | Stop reason: HOSPADM

## 2024-07-15 RX ORDER — CLOPIDOGREL 300 MG/1
600 TABLET, FILM COATED ORAL ONCE
Status: COMPLETED | OUTPATIENT
Start: 2024-07-15 | End: 2024-07-15

## 2024-07-15 RX ORDER — IPRATROPIUM BROMIDE AND ALBUTEROL SULFATE 2.5; .5 MG/3ML; MG/3ML
1 SOLUTION RESPIRATORY (INHALATION)
Status: DISCONTINUED | OUTPATIENT
Start: 2024-07-15 | End: 2024-07-15 | Stop reason: HOSPADM

## 2024-07-15 RX ORDER — SODIUM CHLORIDE 9 MG/ML
INJECTION, SOLUTION INTRAVENOUS CONTINUOUS
Status: DISCONTINUED | OUTPATIENT
Start: 2024-07-15 | End: 2024-07-15 | Stop reason: HOSPADM

## 2024-07-15 RX ORDER — ONDANSETRON 2 MG/ML
INJECTION INTRAMUSCULAR; INTRAVENOUS PRN
Status: DISCONTINUED | OUTPATIENT
Start: 2024-07-15 | End: 2024-07-15 | Stop reason: SDUPTHER

## 2024-07-15 RX ORDER — DROPERIDOL 2.5 MG/ML
0.62 INJECTION, SOLUTION INTRAMUSCULAR; INTRAVENOUS
Status: DISCONTINUED | OUTPATIENT
Start: 2024-07-15 | End: 2024-07-15 | Stop reason: HOSPADM

## 2024-07-15 RX ORDER — SODIUM CHLORIDE 0.9 % (FLUSH) 0.9 %
5-40 SYRINGE (ML) INJECTION PRN
Status: DISCONTINUED | OUTPATIENT
Start: 2024-07-15 | End: 2024-07-16 | Stop reason: HOSPADM

## 2024-07-15 RX ORDER — TAMSULOSIN HYDROCHLORIDE 0.4 MG/1
0.4 CAPSULE ORAL DAILY
Status: DISCONTINUED | OUTPATIENT
Start: 2024-07-15 | End: 2024-07-16 | Stop reason: HOSPADM

## 2024-07-15 RX ORDER — MIDAZOLAM HYDROCHLORIDE 1 MG/ML
INJECTION INTRAMUSCULAR; INTRAVENOUS PRN
Status: DISCONTINUED | OUTPATIENT
Start: 2024-07-15 | End: 2024-07-15 | Stop reason: SDUPTHER

## 2024-07-15 RX ORDER — HEPARIN SODIUM 5000 [USP'U]/ML
INJECTION, SOLUTION INTRAVENOUS; SUBCUTANEOUS PRN
Status: DISCONTINUED | OUTPATIENT
Start: 2024-07-15 | End: 2024-07-15 | Stop reason: SDUPTHER

## 2024-07-15 RX ORDER — FOLIC ACID 1 MG/1
1 TABLET ORAL DAILY
Status: DISCONTINUED | OUTPATIENT
Start: 2024-07-15 | End: 2024-07-16 | Stop reason: HOSPADM

## 2024-07-15 RX ADMIN — SODIUM CHLORIDE, PRESERVATIVE FREE 10 ML: 5 INJECTION INTRAVENOUS at 18:37

## 2024-07-15 RX ADMIN — SUGAMMADEX 100 MG: 100 INJECTION, SOLUTION INTRAVENOUS at 14:26

## 2024-07-15 RX ADMIN — SODIUM CHLORIDE: 9 INJECTION, SOLUTION INTRAVENOUS at 13:02

## 2024-07-15 RX ADMIN — HEPARIN SODIUM 8000 UNITS: 5000 INJECTION INTRAVENOUS; SUBCUTANEOUS at 13:39

## 2024-07-15 RX ADMIN — FOLIC ACID 1 MG: 1 TABLET ORAL at 20:27

## 2024-07-15 RX ADMIN — GLYCOPYRROLATE 0.2 MG: 1 INJECTION INTRAMUSCULAR; INTRAVENOUS at 14:13

## 2024-07-15 RX ADMIN — SODIUM CHLORIDE: 9 INJECTION, SOLUTION INTRAVENOUS at 13:20

## 2024-07-15 RX ADMIN — PROPOFOL 150 MG: 10 INJECTION, EMULSION INTRAVENOUS at 13:16

## 2024-07-15 RX ADMIN — PROTAMINE SULFATE 80 MG: 10 INJECTION, SOLUTION INTRAVENOUS at 14:18

## 2024-07-15 RX ADMIN — PANTOPRAZOLE SODIUM 40 MG: 40 TABLET, DELAYED RELEASE ORAL at 17:49

## 2024-07-15 RX ADMIN — CEFAZOLIN 2000 MG: 2 INJECTION, POWDER, FOR SOLUTION INTRAMUSCULAR; INTRAVENOUS at 12:58

## 2024-07-15 RX ADMIN — FENTANYL CITRATE 50 MCG: 50 INJECTION, SOLUTION INTRAMUSCULAR; INTRAVENOUS at 13:14

## 2024-07-15 RX ADMIN — Medication 100 MG: at 20:27

## 2024-07-15 RX ADMIN — Medication 1 CAPSULE: at 20:28

## 2024-07-15 RX ADMIN — ASPIRIN 325 MG: 325 TABLET ORAL at 10:24

## 2024-07-15 RX ADMIN — FENTANYL CITRATE 50 MCG: 50 INJECTION, SOLUTION INTRAMUSCULAR; INTRAVENOUS at 13:16

## 2024-07-15 RX ADMIN — FUROSEMIDE 20 MG: 20 TABLET ORAL at 20:27

## 2024-07-15 RX ADMIN — SODIUM CHLORIDE, PRESERVATIVE FREE 10 ML: 5 INJECTION INTRAVENOUS at 20:28

## 2024-07-15 RX ADMIN — PHENYLEPHRINE HYDROCHLORIDE 50 MCG: 10 INJECTION, SOLUTION INTRAVENOUS at 14:00

## 2024-07-15 RX ADMIN — SODIUM CHLORIDE 500 ML: 9 INJECTION, SOLUTION INTRAVENOUS at 10:34

## 2024-07-15 RX ADMIN — ENOXAPARIN SODIUM 40 MG: 100 INJECTION SUBCUTANEOUS at 23:43

## 2024-07-15 RX ADMIN — ROCURONIUM BROMIDE 50 MG: 10 INJECTION, SOLUTION INTRAVENOUS at 13:17

## 2024-07-15 RX ADMIN — SODIUM CHLORIDE: 9 INJECTION, SOLUTION INTRAVENOUS at 14:19

## 2024-07-15 RX ADMIN — DEXAMETHASONE SODIUM PHOSPHATE 10 MG: 10 INJECTION, EMULSION INTRAMUSCULAR; INTRAVENOUS at 13:26

## 2024-07-15 RX ADMIN — CLOPIDOGREL BISULFATE 600 MG: 300 TABLET, FILM COATED ORAL at 10:24

## 2024-07-15 RX ADMIN — FENTANYL CITRATE 50 MCG: 50 INJECTION, SOLUTION INTRAMUSCULAR; INTRAVENOUS at 13:39

## 2024-07-15 RX ADMIN — WATER 2000 MG: 1 INJECTION INTRAMUSCULAR; INTRAVENOUS; SUBCUTANEOUS at 18:36

## 2024-07-15 RX ADMIN — FENTANYL CITRATE 50 MCG: 50 INJECTION, SOLUTION INTRAMUSCULAR; INTRAVENOUS at 14:00

## 2024-07-15 RX ADMIN — ONDANSETRON HYDROCHLORIDE 4 MG: 2 INJECTION, SOLUTION INTRAMUSCULAR; INTRAVENOUS at 14:11

## 2024-07-15 RX ADMIN — MIDAZOLAM 2 MG: 1 INJECTION INTRAMUSCULAR; INTRAVENOUS at 13:05

## 2024-07-15 ASSESSMENT — ENCOUNTER SYMPTOMS: SHORTNESS OF BREATH: 1

## 2024-07-15 ASSESSMENT — PAIN SCALES - GENERAL: PAINLEVEL_OUTOF10: 0

## 2024-07-15 ASSESSMENT — LIFESTYLE VARIABLES: SMOKING_STATUS: 1

## 2024-07-15 NOTE — FLOWSHEET NOTE
07/15/24 1642   Belongings   Dental Appliances None   Vision - Corrective Lenses Eyeglasses;At bedside   Hearing Aid None   Clothing Pants;Shirt;Undergarments;Socks;Footwear;At bedside   Jewelry Ring;At bedside   Body Piercings Removed No   Electronic Devices None   Weapons (Notify Protective Services/Security) None   Other Valuables At home   Home Medications None   Valuables Given To Patient   Provide Name(s) of Who Valuable(s) Were Given To marcia valenzuela   Orientation to Room   Patient/Responsible Party informed of Rights and Responsibilities Yes   Orientation to Room Performed Yes

## 2024-07-15 NOTE — PROGRESS NOTES
4 Eyes Skin Assessment     NAME:  Travon Negrete  YOB: 1955  MEDICAL RECORD NUMBER:  [id number]    The patient is being assessed for  Admission    I agree that at least one RN has performed a thorough Head to Toe Skin Assessment on the patient. ALL assessment sites listed below have been assessed.      Areas assessed by both nurses:    Head, Face, Ears, Shoulders, Back, Chest, Arms, Elbows, Hands, Sacrum. Buttock, Coccyx, Ischium, Legs. Feet and Heels, and Under Medical Devices         Does the Patient have a Wound? No noted wound(s)       Robert Prevention initiated by RN: No  Wound Care Orders initiated by RN: No    Pressure Injury (Stage 3,4, Unstageable, DTI, NWPT, and Complex wounds) if present, place Wound referral order by RN under : No    New Ostomies, if present place, Ostomy referral order under : No     Nurse 1 eSignature: Electronically signed by Arlet Young RN on 7/15/24 at 6:32 PM EDT    **SHARE this note so that the co-signing nurse can place an eSignature**    Nurse 2 eSignature: Electronically signed by Kimberly RossiPallante, RN on 7/15/24 at 6:38 PM EDT

## 2024-07-15 NOTE — ANESTHESIA PRE PROCEDURE
solid food consumption: 07/14/24    BMI:   Wt Readings from Last 3 Encounters:   07/15/24 74.8 kg (165 lb)   05/21/24 72.8 kg (160 lb 9.6 oz)   04/16/24 79.8 kg (176 lb)     Body mass index is 23.68 kg/m².    CBC:   Lab Results   Component Value Date/Time    WBC 4.9 07/12/2024 10:45 AM    RBC 3.28 07/12/2024 10:45 AM    HGB 9.8 07/12/2024 10:45 AM    HCT 30.4 07/12/2024 10:45 AM    MCV 92.7 07/12/2024 10:45 AM    RDW 15.3 07/12/2024 10:45 AM     07/12/2024 10:45 AM       CMP:   Lab Results   Component Value Date/Time     07/15/2024 10:45 AM    K 3.5 07/15/2024 10:45 AM    CL 97 07/15/2024 10:45 AM    CO2 25 07/15/2024 10:45 AM    BUN 8 07/15/2024 10:45 AM    CREATININE 0.6 07/15/2024 10:45 AM    LABGLOM >90 07/15/2024 10:45 AM    LABGLOM >90 04/20/2024 03:39 AM    GLUCOSE 103 07/15/2024 10:45 AM    CALCIUM 9.5 07/15/2024 10:45 AM    BILITOT 1.3 04/20/2024 03:39 AM    ALKPHOS 134 04/20/2024 03:39 AM    AST 30 04/20/2024 03:39 AM    ALT 7 04/20/2024 03:39 AM       POC Tests: No results for input(s): \"POCGLU\", \"POCNA\", \"POCK\", \"POCCL\", \"POCBUN\", \"POCHEMO\", \"POCHCT\" in the last 72 hours.    Coags:   Lab Results   Component Value Date/Time    PROTIME 11.3 07/12/2024 10:45 AM    INR 1.0 07/12/2024 10:45 AM    APTT 34.6 07/12/2024 10:45 AM       HCG (If Applicable): No results found for: \"PREGTESTUR\", \"PREGSERUM\", \"HCG\", \"HCGQUANT\"     ABGs: No results found for: \"PHART\", \"PO2ART\", \"BAY0HFU\", \"KZU4NRS\", \"BEART\", \"G3ENHXHF\"     Type & Screen (If Applicable):  No results found for: \"LABABO\"    Drug/Infectious Status (If Applicable):  Lab Results   Component Value Date/Time    HEPCAB NONREACTIVE 11/18/2023 04:18 AM       COVID-19 Screening (If Applicable):   Lab Results   Component Value Date/Time    COVID19 Not Detected 04/16/2024 12:00 PM           Anesthesia Evaluation  Patient summary reviewed and Nursing notes reviewed   no history of anesthetic complications:   Airway: Mallampati: II  TM distance: >3 FB

## 2024-07-15 NOTE — DISCHARGE INSTRUCTIONS
Dr [facility] Instructions    Restrictions  No driving for 3 days  Lifting, push, pull restrictions max of 10 lbs. for 1 week  No strenuous activity for 1 week.   No stairs for 24 hours. Minimal walking today.  No driving, working, or sexual activity for 48 hours.  Splint procedure site with activity today such as: Changing Positions, Coughing,  Sneezing, Laughing  You may shower after 24 hours  Soak dressing with water in shower and gently peel off. Cleanse in shower gently with soap and water. Cover with band aid for additional 24 hours then remove.   Keep the incision area clean and dry (Do not scrub the area.)   You need use a Band-Aid over the incision if you wish to, however, change it every day  Do not use creams, lotions, or ointments on the incision site   Look at the area daily to make sure it is healing properly  If you notice any of the signs of infection please call your doctor  Bruising can occur and will resolve over time.  No tub bathing or swimming for 1 week.  Palpitations are common post procedure and will resolve over time. If they sustain or you feel lightheaded or dizzy with them, call Dr. Walker's office immediately.     Traveling Home          For your safety, a responsible adult must drive you home the day of discharge.       Call your physician if you notice:    Please call us right away if you have any of these signs of infection:  Increased drainage,pain,  bleeding, or oozing from the incision site (If this develops please apply direct pressure to site.)  Increased opening of the incision  Redness, swelling, or warmth around the incision site  Increased body temperature (greater than 101° F or 38.4° C)  Fever, shaking or chills, and/or dizziness  Numbness, tingling or cramping in the procedure leg at rest or with activity.  Heart palpitations (sensation of fast or hard beating from your heart)  If you gain more than 2 lbs. overnight or 4 pounds in a week  Also call your provider

## 2024-07-15 NOTE — H&P
STRUCTURAL CARDIOLOGY INPATIENT HISTORY AND PHYSICAL EXAMINATION NOTE       PATIENT DEMOGRAPHICS:       NAME: Travon Negrete   YOB: 1955   AGE: 68 y.o.   MEDICAL RECORD NUMBER: [id number]       ADMISSION INFORMATION:      DATE OF ADMISSION: 7/15/2024       PRINCIPLE DIAGNOSIS:?Atrial fibrillation?   PLANNED PROCEDURE: Left Atrial Appendage Occlusion Device (Watchman)     CARE TEAM MEMBERS   PCP : Daniel Cuello MD?   PRIMARY CARDIOLOGIST: Dr. Rudd    REFERRING PROVIDER: Dr. Rudd         HISTORY OF PRESENT ILLNESS:        Travon Negrete IS a 68 y.o. male referred by Dr. Rudd  who presents to the hospital today for planned Left Atrial Appendage Occlusion Device (Watchman). Past medical history includes paroxysmal A-fib (Dx 11/23, was deemed unsuitable for anticoagulation 2/2 anemia requiring RBC and hematuria), HTN, nonobstructive CAD, EtOH abuse, prostate cancer s/p radiation, anemia and recurrent hematuria (2/2 radiation cystitis), melena with colonoscopy showing colon polyps.      The patient was seen in structural clinic on 5/21/24. Per last clinic note:   Patient was first diagnosed with A-fib in November 2023.  He reports that he had another episode of A-fib in April 2024.  He was never put on any anticoagulation due to anemia and recurrent hematuria.  Patient had outpatient evaluation for GI bleed with EGD, colonoscopy and capsule endoscopy with no signs of active bleeding or high risk lesions.  Heme-onc is following and recommending bone marrow aspirate for further evaluation of his anemia     CT abdomen pelvis showing thickening of the wall of rectum diffusely and diffuse thickening of the bladder wall along with ascites.     Shared decision has been made between the patient, our team, and Dr. Rudd       PAST HISTORY:   Past Medical History:   Diagnosis Date    Atrial fibrillation (HCC)     History of blood transfusion     Hypertension    ?   Past Surgical History:   Procedure  normal. Mildly increased wall thickness. Normal wall motion. Grade I diastolic dysfunction with normal LAP.    Aortic Valve: Trileaflet valve.    Left Atrium: Left atrium is mildly dilated.    Image quality is good.     -ECG 5/21/2024: Normal sinus rhythm, nonspecific ST-T wave changes.             LABORATORY DATA:     Lab Results   Component Value Date/Time    WBC 4.9 07/12/2024 10:45 AM    HGB 9.8 07/12/2024 10:45 AM    HCT 30.4 07/12/2024 10:45 AM     07/12/2024 10:45 AM      Lab Results   Component Value Date/Time     07/15/2024 10:45 AM    K 3.5 07/15/2024 10:45 AM    CO2 25 07/15/2024 10:45 AM    BUN 8 07/15/2024 10:45 AM    CREATININE 0.6 07/15/2024 10:45 AM    MG 1.8 04/17/2024 04:54 AM      No components found for: \"HGBA1C\"   Lab Results   Component Value Date/Time    TSH 2.30 04/17/2024 04:54 AM      No components found for: \"LDLCALC\"            FRAILTY ASSESSMENT:   New York Heart Association (NYHA) Classification: I   SANDY²DS²-VASc Score: 2 (age, HTN)   HAS-BLED Score: 2 (age, bleeding)       ASSESSMENT & PLAN:     Proceed with planned Left Atrial Appendage Occlusion Device (Watchman):   Will do the following in preparation for procedure:   Keep NPO for the procedure   Anesthesia Planned:?General   Intraprocedural Imaging:?Transesophageal Echocardiogram (notify echo department)   Intended Access:?right femoral venous access   Remainder of orders to be placed post-procedure   Will resume home medications after the procedure as appropriate.   Admission to the hospital for observation following procedure.   Pt is under the care of a cohesive multidisciplinary team.     Travon Negrete?will be enrolled in approved national registry, NCT #[id number], as mandated by National Coverage Decision for CMS.

## 2024-07-16 ENCOUNTER — APPOINTMENT (OUTPATIENT)
Age: 69
End: 2024-07-16
Attending: INTERNAL MEDICINE
Payer: MEDICARE

## 2024-07-16 VITALS
TEMPERATURE: 98 F | HEART RATE: 57 BPM | WEIGHT: 165 LBS | OXYGEN SATURATION: 98 % | RESPIRATION RATE: 18 BRPM | BODY MASS INDEX: 23.62 KG/M2 | SYSTOLIC BLOOD PRESSURE: 122 MMHG | DIASTOLIC BLOOD PRESSURE: 75 MMHG | HEIGHT: 70 IN

## 2024-07-16 LAB
ANION GAP SERPL CALCULATED.3IONS-SCNC: 13 MMOL/L (ref 7–16)
BUN SERPL-MCNC: 12 MG/DL (ref 6–23)
CALCIUM SERPL-MCNC: 9.3 MG/DL (ref 8.6–10.2)
CHLORIDE SERPL-SCNC: 99 MMOL/L (ref 98–107)
CO2 SERPL-SCNC: 25 MMOL/L (ref 22–29)
CREAT SERPL-MCNC: 0.7 MG/DL (ref 0.7–1.2)
ECHO BSA: 1.92 M2
ERYTHROCYTE [DISTWIDTH] IN BLOOD BY AUTOMATED COUNT: 15.3 % (ref 11.5–15)
GFR, ESTIMATED: >90 ML/MIN/1.73M2
GLUCOSE SERPL-MCNC: 124 MG/DL (ref 74–99)
HCT VFR BLD AUTO: 26.5 % (ref 37–54)
HGB BLD-MCNC: 8.5 G/DL (ref 12.5–16.5)
MCH RBC QN AUTO: 30.4 PG (ref 26–35)
MCHC RBC AUTO-ENTMCNC: 32.1 G/DL (ref 32–34.5)
MCV RBC AUTO: 94.6 FL (ref 80–99.9)
PLATELET # BLD AUTO: 72 K/UL (ref 130–450)
PLATELET CONFIRMATION: NORMAL
PMV BLD AUTO: 11.2 FL (ref 7–12)
POTASSIUM SERPL-SCNC: 4.9 MMOL/L (ref 3.5–5)
RBC # BLD AUTO: 2.8 M/UL (ref 3.8–5.8)
SODIUM SERPL-SCNC: 137 MMOL/L (ref 132–146)
WBC OTHER # BLD: 6 K/UL (ref 4.5–11.5)

## 2024-07-16 PROCEDURE — 6360000002 HC RX W HCPCS: Performed by: INTERNAL MEDICINE

## 2024-07-16 PROCEDURE — 6370000000 HC RX 637 (ALT 250 FOR IP): Performed by: INTERNAL MEDICINE

## 2024-07-16 PROCEDURE — 80048 BASIC METABOLIC PNL TOTAL CA: CPT

## 2024-07-16 PROCEDURE — 2580000003 HC RX 258: Performed by: INTERNAL MEDICINE

## 2024-07-16 PROCEDURE — 85027 COMPLETE CBC AUTOMATED: CPT

## 2024-07-16 PROCEDURE — 93308 TTE F-UP OR LMTD: CPT | Performed by: INTERNAL MEDICINE

## 2024-07-16 PROCEDURE — 36415 COLL VENOUS BLD VENIPUNCTURE: CPT

## 2024-07-16 PROCEDURE — 99239 HOSP IP/OBS DSCHRG MGMT >30: CPT | Performed by: INTERNAL MEDICINE

## 2024-07-16 PROCEDURE — 93308 TTE F-UP OR LMTD: CPT

## 2024-07-16 RX ORDER — AMOXICILLIN 500 MG/1
CAPSULE ORAL
Qty: 4 CAPSULE | Refills: 2 | Status: SHIPPED | OUTPATIENT
Start: 2024-07-16 | End: 2025-07-15

## 2024-07-16 RX ORDER — ASPIRIN 81 MG/1
81 TABLET, CHEWABLE ORAL DAILY
Qty: 90 TABLET | Refills: 3 | Status: SHIPPED | OUTPATIENT
Start: 2024-07-17

## 2024-07-16 RX ORDER — CLOPIDOGREL BISULFATE 75 MG/1
75 TABLET ORAL DAILY
Qty: 90 TABLET | Refills: 1 | Status: SHIPPED | OUTPATIENT
Start: 2024-07-17

## 2024-07-16 RX ADMIN — FERROUS SULFATE TAB 325 MG (65 MG ELEMENTAL FE) 325 MG: 325 (65 FE) TAB at 08:11

## 2024-07-16 RX ADMIN — CLOPIDOGREL BISULFATE 75 MG: 75 TABLET ORAL at 08:11

## 2024-07-16 RX ADMIN — Medication 100 MG: at 08:11

## 2024-07-16 RX ADMIN — Medication 1 CAPSULE: at 08:11

## 2024-07-16 RX ADMIN — ASPIRIN 81 MG 81 MG: 81 TABLET ORAL at 08:11

## 2024-07-16 RX ADMIN — SODIUM CHLORIDE, PRESERVATIVE FREE 10 ML: 5 INJECTION INTRAVENOUS at 08:12

## 2024-07-16 RX ADMIN — METOPROLOL SUCCINATE 50 MG: 25 TABLET, EXTENDED RELEASE ORAL at 11:21

## 2024-07-16 RX ADMIN — TAMSULOSIN HYDROCHLORIDE 0.4 MG: 0.4 CAPSULE ORAL at 08:11

## 2024-07-16 RX ADMIN — FOLIC ACID 1 MG: 1 TABLET ORAL at 08:13

## 2024-07-16 RX ADMIN — PANTOPRAZOLE SODIUM 40 MG: 40 TABLET, DELAYED RELEASE ORAL at 06:45

## 2024-07-16 RX ADMIN — ENOXAPARIN SODIUM 40 MG: 100 INJECTION SUBCUTANEOUS at 08:12

## 2024-07-16 RX ADMIN — FUROSEMIDE 20 MG: 20 TABLET ORAL at 08:12

## 2024-07-16 RX ADMIN — WATER 2000 MG: 1 INJECTION INTRAMUSCULAR; INTRAVENOUS; SUBCUTANEOUS at 03:01

## 2024-07-16 ASSESSMENT — PAIN SCALES - GENERAL: PAINLEVEL_OUTOF10: 0

## 2024-07-16 NOTE — CARE COORDINATION
Social Work Discharge Planning:  Patient admitted for presence of watchman left atrial appendage closure device SW met with patient explained role. Patient lives with spouse in a 3rd floor apt with elevator access. Patient independent prior to admission. PCP is Dr. Cuello and pharmacy is  [facility] on [address]. Patient's spouse will transport home, no needs identified. SW following.  Case Management Assessment  Initial Evaluation    Date/Time of Evaluation: 7/16/2024 8:46 AM  Assessment Completed by: JASMEET Patterson    If patient is discharged prior to next notation, then this note serves as note for discharge by case management.    Patient Name: Travon Negrete                   YOB: 1955  Diagnosis: Paroxysmal atrial fibrillation (HCC) [I48.0]  Presence of Watchman left atrial appendage closure device [Z95.818]                   Date / Time: 7/15/2024  9:47 AM    Patient Admission Status: Inpatient   Readmission Risk (Low < 19, Mod (19-27), High > 27): Readmission Risk Score: 18.1    Current PCP: Daniel Cuello MD  PCP verified by CM? Yes    Chart Reviewed: Yes      History Provided by: Patient  Patient Orientation: Alert and Oriented    Patient Cognition: Alert    Hospitalization in the last 30 days (Readmission):  No    If yes, Readmission Assessment in  Navigator will be completed.    Advance Directives:      Code Status: Full Code   Patient's Primary Decision Maker is:      Primary Decision Maker: Chantale Negrete - Spouse - [phone]    Discharge Planning:    Patient lives with: Spouse/Significant Other Type of Home: House  Primary Care Giver: Self  Patient Support Systems include: Spouse/Significant Other, Family Members   Current Financial resources:    Current community resources:    Current services prior to admission: None            Current DME:              Type of Home Care services:  None    ADLS  Prior functional level: Independent in ADLs/IADLs  Current functional

## 2024-07-16 NOTE — PROGRESS NOTES
Monitor dcd cleaned and placed in slot in nurses station. Discharge instructions and meds reviewed with patient. Patient left in wheelchair with all belongings that were documented that he came in with.

## 2024-07-16 NOTE — ACP (ADVANCE CARE PLANNING)
Advance Care Planning   Healthcare Decision Maker:    Primary Decision Maker: Chantale Negrete - [facility] - [phone]

## 2024-07-16 NOTE — CARE COORDINATION
Attempted to schedule follow-up appointment with Dr. Cuello's office and was notified that only the patient or family could schedule an appointment.

## 2024-07-16 NOTE — DISCHARGE SUMMARY
[facility]    DISCHARGE SUMMARY    DEMOGRAPHICS Travon Negrete / 1955 (68 y.o.) / MRN [id number]   ADMIT / DC DATE 7/15/2024 - 7/16/2024 (LOS : 1)   ATTENDING Bradford Jules MD   PRIMARY CARDIOLOGY: Dr. Rudd  PRIMARY CARE Daniel Cuello MD      REASON FOR ADMIT : Watchman Implant     DISPOSITION Home, lives with family  PLANNED READMIT no    Physician: Bradford Nelson MD    PRIMARY DISCHARGE DIAGNOSIS   1. PAF (paroxysmal A-fib)      SECONDARY DISCHARGE DIAGNOSIS      High risk bleeding - recurrent hematuria (2/2 radiation cystitis), melena with colonoscopy showing colon polyps.         ** Implanted device is MRI compatible **     ** Prophylactic antibiotics required before dental work for 1 year and avoid cleanings for 8 weeks post-procedure **         CONSULTANTS : none         SUMMARY OF PROCEDURES/STUDIES (see individual procedure notes for more details)      1. 7/15/24 - Successful Left Atrial Appendage Occlusion Device (Watchman) Procedure using 31 mm Watchman FLX Pro device.      2. 7/16/24: Limited TTE -  EF 60-65%.  No pericardial effusion        FOLLOW STUDIES  Follow up 45 Day BILL : 8/29/24      FOLLOW UP APPOINTMENTS   Dr. Nelson : 9/3/24      DISCHARGE MEDICATIONS   Current Discharge Medication List        START taking these medications    Details   aspirin 81 MG chewable tablet Take 1 tablet by mouth daily  Qty: 90 tablet, Refills: 3      clopidogrel (PLAVIX) 75 MG tablet Take 1 tablet by mouth daily  Qty: 90 tablet, Refills: 1      amoxicillin (AMOXIL) 500 MG capsule Take 2,000 mg 1 hour prior to dental procedures  Qty: 4 capsule, Refills: 2           CONTINUE these medications which have NOT CHANGED    Details   furosemide (LASIX) 20 MG tablet Take 1 tablet by mouth 2 times daily      ferrous sulfate (IRON 325) 325 (65 Fe) MG tablet Take 1 tablet by mouth daily (with breakfast)      metoprolol succinate (TOPROL XL) 50 MG extended release tablet Take 1  changes explained and all questions answered to the best of our abilities with the information available from this admission.          Planned Anticoagulation / Antiplatelet Medication Regimen:  Plavix 75 mg & ASA 81 mg daily for 6 months then ASA 81 mg daily      DISCHARGE EXAM     Vital Signs: /75   Pulse 57   Temp 98 °F (36.7 °C) (Temporal)   Resp 18   Ht 1.778 m (5' 10\")   Wt 74.8 kg (165 lb)   SpO2 98%   BMI 23.68 kg/m²     GEN : Well developed, well nourished, alert and cooperative, NAD   NECK : no evidence of JVD   CHEST WALL : No evidence of pectus deformities   CV: regular rate and rhythm, no M,R,G  VASC : Access site without bleeding, hematoma and good pulses noted, no bruit over site   LUNGS : Lungs clear to auscultation bilaterally   ABD : Soft and non-tender    : deferred at this time    SKIN : Skin normal color, texture and turgor with no lesions or eruptions       DISCHARGE LABS   Lab Results   Component Value Date/Time     07/16/2024 07:19 AM    K 4.9 07/16/2024 07:19 AM    CL 99 07/16/2024 07:19 AM    CO2 25 07/16/2024 07:19 AM    BUN 12 07/16/2024 07:19 AM    CREATININE 0.7 07/16/2024 07:19 AM      Lab Results   Component Value Date/Time    WBC 6.0 07/16/2024 07:19 AM    HGB 8.5 07/16/2024 07:19 AM    PLT 72 07/16/2024 07:19 AM      Lab Results   Component Value Date/Time    INR 1.0 07/12/2024 10:45 AM      ______________________________________________________________________________________     AFTER HOSPITAL CARE PLAN for Travno Negrete:     For any questions or problems please call the Cardiology / Interventional Fellow or Attending Physician listed in the above section of this discharge summary. Cardiology services has on-call personnel available 24-7 to answer any and all questions that you may have regarding patient care. Patients or physicians can reach a provider directly through the hospital       OTHER COUNSELING PROVIDED THIS ADMISSION   - The patient was

## 2024-07-16 NOTE — ANESTHESIA POSTPROCEDURE EVALUATION
Department of Anesthesiology  Postprocedure Note    Patient: Travon Negrete  MRN: [id number]  YOB: 1955  Date of evaluation: 7/16/2024    Procedure Summary       Date: 07/15/24 Room / Location: [facility] [place] [facility] PERIPHERAL LAB 3 / [facility] CARDIAC CATH LAB    Anesthesia Start: 1302 Anesthesia Stop: 1446    Procedure: Watchman robbie closure device Diagnosis:       Paroxysmal atrial fibrillation (HCC)      (Afib)    Providers: Bradford Nelson MD Responsible Provider: Evelyn Rincon MD    Anesthesia Type: general ASA Status: 3            Anesthesia Type: No value filed.    Julian Phase I: Julian Score: 9    Julian Phase II:      Anesthesia Post Evaluation    Patient location during evaluation: PACU  Patient participation: complete - patient participated  Level of consciousness: awake and alert  Airway patency: patent  Nausea & Vomiting: no nausea and no vomiting  Cardiovascular status: blood pressure returned to baseline and hemodynamically stable  Respiratory status: acceptable and spontaneous ventilation  Hydration status: euvolemic  Multimodal analgesia pain management approach  Pain management: adequate    There were no known notable events for this encounter.

## 2024-07-17 NOTE — PROGRESS NOTES
CLINICAL PHARMACY NOTE: MEDS TO BEDS    Total # of Prescriptions Filled: 3   The following medications were delivered to the patient:  Aspirin 81 mg chewable  Amoxicillin 500 mg  Clopidogrel bisulfate 75 mg    Additional Documentation:   Wife picked up in the pharmacy

## 2024-07-19 ENCOUNTER — TELEPHONE (OUTPATIENT)
Dept: CARDIOLOGY CLINIC | Age: 69
End: 2024-07-19

## 2024-07-19 NOTE — TELEPHONE ENCOUNTER
WADEMAN FU CALL    I called Travon to see how he's doing since his / her Watchman procedure on 7/15/24.  He  She states he's doing fine.  Denies any nausea, vomiting, chest pain, SOB, bleeding, drainage or swelling from R groin incision.     He stated he's taking the ASA 81 mg & Plavix 75 mg daily as prescribed.     I reminded him of his BILL on 8/29/24 at 8:30 am & his FU appt w/ Dr. Potter on 9/3/24 at 9:15 am. He voiced no complaints and is thankful for the call.

## 2024-08-20 ENCOUNTER — TELEPHONE (OUTPATIENT)
Dept: CARDIOLOGY CLINIC | Age: 69
End: 2024-08-20

## 2024-08-20 NOTE — TELEPHONE ENCOUNTER
BILL INSTRUCTIONS     I called Hime to go over his her BILL Instructions  Place: [facility]  Date & time of BILL: 8/29/24 at 8:30 am  Arrival Time: 7:30 am  NPO after midnight before BILL  May take your medications with a sip of water the morning of the procedure  Hold diuretics day of BILL: Lasix  Must have a ride home after the procedure  More detailed instructions mailed to pt, emailed to [email or web address], and will be scanned into Epic.  All questions answered    Dr. Nelson Appt  Dr. Potter appt: 9/3/24 at 9:15 am.  Arrival time: 8:45 am

## 2024-08-28 ENCOUNTER — TELEPHONE (OUTPATIENT)
Age: 69
End: 2024-08-28

## 2024-08-29 ENCOUNTER — ANESTHESIA EVENT (OUTPATIENT)
Age: 69
End: 2024-08-29
Payer: MEDICARE

## 2024-08-29 ENCOUNTER — ANESTHESIA (OUTPATIENT)
Age: 69
End: 2024-08-29
Payer: MEDICARE

## 2024-08-29 ENCOUNTER — HOSPITAL ENCOUNTER (OUTPATIENT)
Age: 69
Discharge: HOME | End: 2024-08-31
Attending: INTERNAL MEDICINE
Payer: MEDICARE

## 2024-08-29 VITALS
WEIGHT: 167 LBS | SYSTOLIC BLOOD PRESSURE: 152 MMHG | DIASTOLIC BLOOD PRESSURE: 90 MMHG | RESPIRATION RATE: 20 BRPM | TEMPERATURE: 97.5 F | OXYGEN SATURATION: 95 % | HEART RATE: 57 BPM | BODY MASS INDEX: 23.91 KG/M2 | HEIGHT: 70 IN

## 2024-08-29 DIAGNOSIS — Z95.818 PRESENCE OF WATCHMAN LEFT ATRIAL APPENDAGE CLOSURE DEVICE: ICD-10-CM

## 2024-08-29 DIAGNOSIS — I48.0 PAF (PAROXYSMAL ATRIAL FIBRILLATION) (HCC): ICD-10-CM

## 2024-08-29 LAB — ECHO BSA: 1.93 M2

## 2024-08-29 PROCEDURE — 3700000001 HC ADD 15 MINUTES (ANESTHESIA): Performed by: INTERNAL MEDICINE

## 2024-08-29 PROCEDURE — 93325 DOPPLER ECHO COLOR FLOW MAPG: CPT

## 2024-08-29 PROCEDURE — 7100000010 HC PHASE II RECOVERY - FIRST 15 MIN: Performed by: INTERNAL MEDICINE

## 2024-08-29 PROCEDURE — 93320 DOPPLER ECHO COMPLETE: CPT

## 2024-08-29 PROCEDURE — 3700000000 HC ANESTHESIA ATTENDED CARE: Performed by: INTERNAL MEDICINE

## 2024-08-29 PROCEDURE — 93319 3D ECHO IMG CGEN CAR ANOMAL: CPT

## 2024-08-29 PROCEDURE — 6360000002 HC RX W HCPCS: Performed by: NURSE ANESTHETIST, CERTIFIED REGISTERED

## 2024-08-29 PROCEDURE — 2580000003 HC RX 258: Performed by: INTERNAL MEDICINE

## 2024-08-29 RX ORDER — PROPOFOL 10 MG/ML
INJECTION, EMULSION INTRAVENOUS PRN
Status: DISCONTINUED | OUTPATIENT
Start: 2024-08-29 | End: 2024-08-29 | Stop reason: SDUPTHER

## 2024-08-29 RX ORDER — SODIUM CHLORIDE 9 MG/ML
INJECTION, SOLUTION INTRAVENOUS CONTINUOUS
Status: DISCONTINUED | OUTPATIENT
Start: 2024-08-29 | End: 2024-09-01 | Stop reason: HOSPADM

## 2024-08-29 RX ADMIN — SODIUM CHLORIDE: 9 INJECTION, SOLUTION INTRAVENOUS at 08:00

## 2024-08-29 RX ADMIN — PROPOFOL 230 MG: 10 INJECTION, EMULSION INTRAVENOUS at 08:07

## 2024-08-29 ASSESSMENT — ENCOUNTER SYMPTOMS: SHORTNESS OF BREATH: 1

## 2024-08-29 ASSESSMENT — LIFESTYLE VARIABLES: SMOKING_STATUS: 1

## 2024-08-29 NOTE — ANESTHESIA POSTPROCEDURE EVALUATION
Department of Anesthesiology  Postprocedure Note    Patient: Travon Negrete  MRN: [id number]  YOB: 1955  Date of evaluation: 8/29/2024    Procedure Summary       Date: 08/29/24 Room / Location: [facility] Cardiac Cath Lab; [facility] Noninvasive Cardiology    Anesthesia Start: 0749 Anesthesia Stop: 0826    Procedure: BILL (PRN CONTRAST/BUBBLE/3D) Diagnosis:       PAF (paroxysmal atrial fibrillation) (HCC)      Presence of Watchman left atrial appendage closure device    Scheduled Providers: Yang Mcbride MD Responsible Provider: Delroy Hilario DO    Anesthesia Type: MAC ASA Status: 4            Anesthesia Type: No value filed.    Julian Phase I:      Julian Phase II:      Anesthesia Post Evaluation    Patient location during evaluation: PACU  Patient participation: complete - patient participated  Level of consciousness: awake and alert  Pain score: 1  Airway patency: patent  Nausea & Vomiting: no nausea and no vomiting  Cardiovascular status: hemodynamically stable  Respiratory status: acceptable  Hydration status: euvolemic  Pain management: adequate    No notable events documented.

## 2024-08-29 NOTE — ANESTHESIA PRE PROCEDURE
Watchman robbie closure device performed by Bradford Nelson MD at [facility]       Social History:    Social History     Tobacco Use    Smoking status: Every Day     Current packs/day: 0.50     Average packs/day: 0.5 packs/day for 53.8 years (26.9 ttl pk-yrs)     Types: Cigarettes     Start date: 10/31/1970    Smokeless tobacco: Never   Substance Use Topics    Alcohol use: Yes                                Ready to quit: Not Answered  Counseling given: Not Answered      Vital Signs (Current): There were no vitals filed for this visit.                                           BP Readings from Last 3 Encounters:   07/16/24 122/75   05/21/24 120/60   04/20/24 93/67       NPO Status:  >8 hrs                                                                               BMI:   Wt Readings from Last 3 Encounters:   07/15/24 74.8 kg (165 lb)   05/21/24 72.8 kg (160 lb 9.6 oz)   04/16/24 79.8 kg (176 lb)     There is no height or weight on file to calculate BMI.    CBC:   Lab Results   Component Value Date/Time    WBC 6.0 07/16/2024 07:19 AM    RBC 2.80 07/16/2024 07:19 AM    HGB 8.5 07/16/2024 07:19 AM    HCT 26.5 07/16/2024 07:19 AM    MCV 94.6 07/16/2024 07:19 AM    RDW 15.3 07/16/2024 07:19 AM    PLT 72 07/16/2024 07:19 AM       CMP:   Lab Results   Component Value Date/Time     07/16/2024 07:19 AM    K 4.9 07/16/2024 07:19 AM    CL 99 07/16/2024 07:19 AM    CO2 25 07/16/2024 07:19 AM    BUN 12 07/16/2024 07:19 AM    CREATININE 0.7 07/16/2024 07:19 AM    LABGLOM >90 07/16/2024 07:19 AM    LABGLOM >90 04/20/2024 03:39 AM    GLUCOSE 124 07/16/2024 07:19 AM    CALCIUM 9.3 07/16/2024 07:19 AM    BILITOT 1.3 04/20/2024 03:39 AM    ALKPHOS 134 04/20/2024 03:39 AM    AST 30 04/20/2024 03:39 AM    ALT 7 04/20/2024 03:39 AM       POC Tests: No results for input(s): \"POCGLU\", \"POCNA\", \"POCK\", \"POCCL\", \"POCBUN\", \"POCHEMO\", \"POCHCT\" in the last 72 hours.    Coags:   Lab Results   Component Value Date/Time    PROTIME

## 2024-09-03 ENCOUNTER — OFFICE VISIT (OUTPATIENT)
Dept: CARDIOLOGY CLINIC | Age: 69
End: 2024-09-03
Payer: MEDICARE

## 2024-09-03 VITALS
SYSTOLIC BLOOD PRESSURE: 136 MMHG | WEIGHT: 158 LBS | DIASTOLIC BLOOD PRESSURE: 80 MMHG | HEIGHT: 70 IN | BODY MASS INDEX: 22.62 KG/M2 | HEART RATE: 78 BPM | RESPIRATION RATE: 20 BRPM

## 2024-09-03 DIAGNOSIS — I48.0 PAROXYSMAL ATRIAL FIBRILLATION (HCC): Primary | ICD-10-CM

## 2024-09-03 PROCEDURE — 99214 OFFICE O/P EST MOD 30 MIN: CPT | Performed by: INTERNAL MEDICINE

## 2024-09-03 PROCEDURE — G8420 CALC BMI NORM PARAMETERS: HCPCS | Performed by: INTERNAL MEDICINE

## 2024-09-03 PROCEDURE — G8427 DOCREV CUR MEDS BY ELIG CLIN: HCPCS | Performed by: INTERNAL MEDICINE

## 2024-09-03 PROCEDURE — 1123F ACP DISCUSS/DSCN MKR DOCD: CPT | Performed by: INTERNAL MEDICINE

## 2024-09-03 PROCEDURE — 3017F COLORECTAL CA SCREEN DOC REV: CPT | Performed by: INTERNAL MEDICINE

## 2024-09-03 PROCEDURE — 4004F PT TOBACCO SCREEN RCVD TLK: CPT | Performed by: INTERNAL MEDICINE

## 2024-09-03 PROCEDURE — 93000 ELECTROCARDIOGRAM COMPLETE: CPT | Performed by: INTERNAL MEDICINE

## 2024-09-03 ASSESSMENT — ENCOUNTER SYMPTOMS
BLOOD IN STOOL: 0
ABDOMINAL PAIN: 0
VOMITING: 0
COUGH: 0
SHORTNESS OF BREATH: 0
CHEST TIGHTNESS: 0
NAUSEA: 0
BACK PAIN: 0

## 2024-09-03 NOTE — PROGRESS NOTES
OFFICE VISIT    NAME: Travon Negrete     YOB: 1955   AGE: 68 y.o.   MEDICAL RECORD NUMBER: [id number]       CONSULTATION INFORMATION:   DATE OF CLINIC CONSULTATION: 9/3/2024   PRINCIPLE DIAGNOSIS / reason for visit: 45-day post watchman    CARE TEAM MEMBERS    PCP : Daniel Cuello MD     PRIMARY CARDIOLOGIST: Dr. Rudd   REFERRING PROVIDER: Dr. Rudd    HISTORY OF PRESENT ILLNESS:   Travon Negrete is a 68 y.o. male initially referred by Dr. Rudd for Afib and consideration for LAAO, now s/p Watchman device implantation. Past medical history of paroxysmal A-fib (Dx 11/23, was deemed unsuitable for anticoagulation 2/2 anemia requiring RBC and hematuria, now s/p Watchman device implantation 7/15/2024), HTN, nonobstructive CAD, EtOH abuse, prostate cancer s/p radiation, anemia and recurrent hematuria (2/2 radiation cystitis), melena with colonoscopy showing colon polyps.    Patient was first diagnosed with A-fib in November 2023.  He reports that he had another episode of A-fib in April 2024.  He was never put on any anticoagulation due to anemia and recurrent hematuria.  Patient had outpatient evaluation for GI bleed with EGD, colonoscopy and capsule endoscopy with no signs of active bleeding or high risk lesions.  Heme-onc is following and recommending bone marrow aspirate for further evaluation of his anemia    CT abdomen pelvis showing thickening of the wall of rectum diffusely and diffuse thickening of the bladder wall along with ascites.    Today, he presents for his 45-day post watchman implantation follow-up.    -BILL 8/29/2024:    Left Ventricle: Normal left ventricular systolic function with a visually estimated EF of 50 - 55%.    Right Ventricle: Normal systolic function.    Aortic Valve: Trileaflet valve. Mild regurgitation.    Left Atrium: Device closure in the appendage with a Watchman.  The device appears well-seated with no evidence of device related thrombus or peridevice leak.

## 2024-09-05 ENCOUNTER — TELEPHONE (OUTPATIENT)
Dept: CARDIOLOGY CLINIC | Age: 69
End: 2024-09-05

## 2024-09-05 NOTE — TELEPHONE ENCOUNTER
Travon was a \"No Show\" for his appt w/ Dr. Palacios on 9/4/24.  He said \"he forgot\". I rescheduled his appt on 9/18/24 at 9:00 am.

## 2024-11-05 LAB — NON-GYN CYTOLOGY REPORT: NORMAL

## 2025-01-09 ENCOUNTER — TELEPHONE (OUTPATIENT)
Age: 70
End: 2025-01-09

## 2025-02-07 ENCOUNTER — TELEPHONE (OUTPATIENT)
Age: 70
End: 2025-02-07

## 2025-02-11 ENCOUNTER — OFFICE VISIT (OUTPATIENT)
Dept: CARDIOLOGY CLINIC | Age: 70
End: 2025-02-11
Payer: MEDICARE

## 2025-02-11 VITALS
SYSTOLIC BLOOD PRESSURE: 150 MMHG | HEIGHT: 69 IN | RESPIRATION RATE: 16 BRPM | DIASTOLIC BLOOD PRESSURE: 80 MMHG | BODY MASS INDEX: 25.92 KG/M2 | HEART RATE: 76 BPM | WEIGHT: 175 LBS

## 2025-02-11 DIAGNOSIS — I48.0 PAF (PAROXYSMAL ATRIAL FIBRILLATION) (HCC): Primary | ICD-10-CM

## 2025-02-11 PROCEDURE — G8427 DOCREV CUR MEDS BY ELIG CLIN: HCPCS | Performed by: PHYSICIAN ASSISTANT

## 2025-02-11 PROCEDURE — 4004F PT TOBACCO SCREEN RCVD TLK: CPT | Performed by: PHYSICIAN ASSISTANT

## 2025-02-11 PROCEDURE — G8419 CALC BMI OUT NRM PARAM NOF/U: HCPCS | Performed by: PHYSICIAN ASSISTANT

## 2025-02-11 PROCEDURE — 3017F COLORECTAL CA SCREEN DOC REV: CPT | Performed by: PHYSICIAN ASSISTANT

## 2025-02-11 PROCEDURE — 99213 OFFICE O/P EST LOW 20 MIN: CPT | Performed by: PHYSICIAN ASSISTANT

## 2025-02-11 PROCEDURE — 1126F AMNT PAIN NOTED NONE PRSNT: CPT | Performed by: PHYSICIAN ASSISTANT

## 2025-02-11 PROCEDURE — 1159F MED LIST DOCD IN RCRD: CPT | Performed by: PHYSICIAN ASSISTANT

## 2025-02-11 PROCEDURE — 1123F ACP DISCUSS/DSCN MKR DOCD: CPT | Performed by: PHYSICIAN ASSISTANT

## 2025-02-11 NOTE — PROGRESS NOTES
CYSTOSCOPY BILATERAL RETROGRADE PYELOGRAM CLOT EVACUATION, FULGURATION BLADDER, insertion of arboleda catheter performed by Baron Mcdonald [place] at [facility]    EP DEVICE PROCEDURE N/A 7/15/2024    Watchman robbie closure device performed by Bradford Nelson MD at [facility]       Social History:  Social History     Socioeconomic History    Marital status:      Spouse name: Not on file    Number of children: Not on file    Years of education: Not on file    Highest education level: Not on file   Occupational History    Not on file   Tobacco Use    Smoking status: Every Day     Current packs/day: 0.50     Average packs/day: 0.5 packs/day for 54.3 years (27.1 ttl pk-yrs)     Types: Cigarettes     Start date: 10/31/1970    Smokeless tobacco: Never   Vaping Use    Vaping status: Never Used   Substance and Sexual Activity    Alcohol use: Yes    Drug use: Never    Sexual activity: Not on file   Other Topics Concern    Not on file   Social History Narrative    Not on file     Social Determinants of Health     Financial Resource Strain: Not on file   Food Insecurity: No Food Insecurity (12/18/2023)    Hunger Vital Sign     Worried About Running Out of Food in the Last Year: Never true     Ran Out of Food in the Last Year: Never true   Transportation Needs: No Transportation Needs (12/18/2023)    PRAPARE - Transportation     Lack of Transportation (Medical): No     Lack of Transportation (Non-Medical): No   Physical Activity: Not on file   Stress: Not on file   Social Connections: Not on file   Intimate Partner Violence: Not on file   Housing Stability: Low Risk  (12/18/2023)    Housing Stability Vital Sign     Unable to Pay for Housing in the Last Year: No     Number of Places Lived in the Last Year: 1     Unstable Housing in the Last Year: No       Family History:  No family history on file.      Objective:  Vitals:    02/11/25 1308   BP: (!) 150/80   Pulse: 76   Resp: 16     General Appearance: Pleasant 69

## 2025-02-11 NOTE — PATIENT INSTRUCTIONS
Discontinue plavix    Continue aspirin 81 mg daily lifelong    Dione will be in touch to schedule one year BILL and follow up with Dr. Nelson; call sooner if concerns arise in the meantime    Reminder: antibiotic required prior to dental appointments

## 2025-02-19 ENCOUNTER — TRANSCRIBE ORDERS (OUTPATIENT)
Dept: ADMINISTRATIVE | Age: 70
End: 2025-02-19

## 2025-02-19 DIAGNOSIS — I70.208 UNSPECIFIED ATHEROSCLEROSIS OF NATIVE ARTERIES OF EXTREMITIES, OTHER EXTREMITY: Primary | ICD-10-CM

## 2025-05-29 ENCOUNTER — HOSPITAL ENCOUNTER (OUTPATIENT)
Dept: NEUROLOGY | Age: 70
Discharge: HOME | End: 2025-05-29
Payer: MEDICARE

## 2025-05-29 VITALS — BODY MASS INDEX: 24.36 KG/M2 | WEIGHT: 174 LBS | HEIGHT: 71 IN

## 2025-05-29 PROCEDURE — 95911 NRV CNDJ TEST 9-10 STUDIES: CPT

## 2025-05-29 PROCEDURE — 95886 MUSC TEST DONE W/N TEST COMP: CPT

## 2025-05-29 NOTE — PROCEDURES
[facility] [place]  Electrodiagnostic Laboratory   [place]         Full Name: Travon Negrete Gender: Male  MRN: [id number] YOB: 1955  Location: seyz - 2      Visit Date: 5/29/2025 10:14  Age: 69 Years 7 Months Old  Examining Physician: Dr. North   Referring Physician: REGINA Drummond  Technician: Jessica Truong   Height: 5 feet 10 inch  Weight: 174 lbs  Notes: BMI- 24.7  Paresthesia numbness and tingling BLE/ LUE        Sensory NCS      Nerve / Sites Onset Lat Peak Lat PP Amp Distance Velocity Temp.    ms ms µV cm m/s °C   R Superficial peroneal - Ankle      Lat leg NR NR NR 10 NR 31   L Superficial peroneal - Ankle      Lat leg 2.24 2.97 11.2 10 45 31   R Sural - Ankle (Calf)      Calf NR NR NR 14 NR 31   L Sural - Ankle (Calf)      Calf 4.11 4.69 9.5 14 34 31       Motor NCS      Nerve / Sites Lat. Amplitude Distance Velocity Temp.    ms mV cm m/s °C   R Peroneal - EDB      Ankle 3.54 4.4 8  31      Fib head 12.24 3.8 37 43 31      Pop fossa 14.69 3.5 10 41 31   L Peroneal - EDB      Ankle 4.64 5.0 8  31      Fib head 12.66 4.3 9 11 31      Pop fossa 15.42 4.0 34 123 31   R Tibial - AH      Ankle 4.53 6.2 8  31      Pop fossa 15.16 5.0 38 36 31   L Tibial - AH      Ankle 4.84 6.6 8  31      Pop fossa 15.05 4.9 38 37 31       F  Wave      Nerve F Lat M Lat F-M Lat    ms ms ms   R Peroneal - EDB 52.5 4.4 48.1   R Tibial - AH 52.9 5.5 47.4   L Peroneal - EDB 55.8 6.0 49.8   L Tibial - AH 53.1 5.4 47.7       H Reflex      Nerve H Lat    ms   R Tibial - Soleus NR   L Tibial - Soleus NR       EMG         EMG Summary Table     Spontaneous MUAP Recruitment   Muscle Nerve Roots IA Fib PSW Fasc H.F. Amp Dur. PPP Pattern   R. Tibialis anterior Deep peroneal (Fibular) L4-L5 N None None None None N 2+ 1+ Sl Decr   R. Gastrocnemius (Medial head) Tibial S1-S2 N None None None None N 1+ 1+ N   R. Vastus lateralis Femoral L2-L4 N None None None None N N N N   R. Flexor

## 2025-06-27 DIAGNOSIS — I35.0 NONRHEUMATIC AORTIC VALVE STENOSIS: Primary | ICD-10-CM

## 2025-06-27 DIAGNOSIS — I48.0 PAROXYSMAL ATRIAL FIBRILLATION (HCC): ICD-10-CM

## 2025-06-27 DIAGNOSIS — Z95.818 PRESENCE OF WATCHMAN LEFT ATRIAL APPENDAGE CLOSURE DEVICE: ICD-10-CM

## 2025-07-15 ENCOUNTER — TELEPHONE (OUTPATIENT)
Age: 70
End: 2025-07-15

## 2025-07-15 NOTE — TELEPHONE ENCOUNTER
Advised patient of upcoming tests/appointments and that Dione Mendosa will be calling with instructions prior:  8/29/25, 3417 - BILL w/Dr. Jacobsen  9/16/25, 1015 - Visit with Maribell Landis

## 2025-07-18 ENCOUNTER — HOSPITAL ENCOUNTER (EMERGENCY)
Age: 70
Discharge: HOME | End: 2025-07-18
Attending: EMERGENCY MEDICINE
Payer: MEDICARE

## 2025-07-18 ENCOUNTER — APPOINTMENT (OUTPATIENT)
Dept: GENERAL RADIOLOGY | Age: 70
End: 2025-07-18
Payer: MEDICARE

## 2025-07-18 VITALS
DIASTOLIC BLOOD PRESSURE: 84 MMHG | SYSTOLIC BLOOD PRESSURE: 167 MMHG | OXYGEN SATURATION: 98 % | RESPIRATION RATE: 16 BRPM | TEMPERATURE: 98.2 F | HEART RATE: 74 BPM

## 2025-07-18 DIAGNOSIS — Z51.89 VISIT FOR WOUND CHECK: Primary | ICD-10-CM

## 2025-07-18 LAB
ALBUMIN SERPL-MCNC: 4.4 G/DL (ref 3.5–5.2)
ALP SERPL-CCNC: 123 U/L (ref 40–129)
ALT SERPL-CCNC: 13 U/L (ref 0–50)
ANION GAP SERPL CALCULATED.3IONS-SCNC: 23 MMOL/L (ref 7–16)
AST SERPL-CCNC: 30 U/L (ref 0–50)
BASOPHILS # BLD: 0.04 K/UL (ref 0–0.2)
BASOPHILS NFR BLD: 1 % (ref 0–2)
BILIRUB SERPL-MCNC: 0.9 MG/DL (ref 0–1.2)
BNP SERPL-MCNC: 1212 PG/ML (ref 0–125)
BUN SERPL-MCNC: 12 MG/DL (ref 8–23)
CALCIUM SERPL-MCNC: 9.5 MG/DL (ref 8.8–10.2)
CHLORIDE SERPL-SCNC: 103 MMOL/L (ref 98–107)
CO2 SERPL-SCNC: 17 MMOL/L (ref 22–29)
CREAT SERPL-MCNC: 0.9 MG/DL (ref 0.7–1.2)
EOSINOPHIL # BLD: 0.02 K/UL (ref 0.05–0.5)
EOSINOPHILS RELATIVE PERCENT: 1 % (ref 0–6)
ERYTHROCYTE [DISTWIDTH] IN BLOOD BY AUTOMATED COUNT: 15.5 % (ref 11.5–15)
GFR, ESTIMATED: 90 ML/MIN/1.73M2
GLUCOSE SERPL-MCNC: 162 MG/DL (ref 74–99)
HCT VFR BLD AUTO: 34 % (ref 37–54)
HGB BLD-MCNC: 10.6 G/DL (ref 12.5–16.5)
IMM GRANULOCYTES # BLD AUTO: <0.03 K/UL (ref 0–0.58)
IMM GRANULOCYTES NFR BLD: 0 % (ref 0–5)
INR PPP: 1.1
LYMPHOCYTES NFR BLD: 0.79 K/UL (ref 1.5–4)
LYMPHOCYTES RELATIVE PERCENT: 19 % (ref 20–42)
MAGNESIUM SERPL-MCNC: 1.4 MG/DL (ref 1.6–2.4)
MCH RBC QN AUTO: 28.8 PG (ref 26–35)
MCHC RBC AUTO-ENTMCNC: 31.2 G/DL (ref 32–34.5)
MCV RBC AUTO: 92.4 FL (ref 80–99.9)
MONOCYTES NFR BLD: 0.45 K/UL (ref 0.1–0.95)
MONOCYTES NFR BLD: 11 % (ref 2–12)
NEUTROPHILS NFR BLD: 69 % (ref 43–80)
NEUTS SEG NFR BLD: 2.97 K/UL (ref 1.8–7.3)
PLATELET # BLD AUTO: 154 K/UL (ref 130–450)
PMV BLD AUTO: 10.9 FL (ref 7–12)
POTASSIUM SERPL-SCNC: 3.7 MMOL/L (ref 3.5–5.1)
PROT SERPL-MCNC: 7.8 G/DL (ref 6.4–8.3)
PROTHROMBIN TIME: 11.9 SEC (ref 9.3–12.4)
RBC # BLD AUTO: 3.68 M/UL (ref 3.8–5.8)
SODIUM SERPL-SCNC: 142 MMOL/L (ref 136–145)
TROPONIN I SERPL HS-MCNC: 14 NG/L (ref 0–22)
WBC OTHER # BLD: 4.3 K/UL (ref 4.5–11.5)

## 2025-07-18 PROCEDURE — 85025 COMPLETE CBC W/AUTO DIFF WBC: CPT

## 2025-07-18 PROCEDURE — 80053 COMPREHEN METABOLIC PANEL: CPT

## 2025-07-18 PROCEDURE — 99285 EMERGENCY DEPT VISIT HI MDM: CPT

## 2025-07-18 PROCEDURE — 87070 CULTURE OTHR SPECIMN AEROBIC: CPT

## 2025-07-18 PROCEDURE — 93005 ELECTROCARDIOGRAM TRACING: CPT

## 2025-07-18 PROCEDURE — 87205 SMEAR GRAM STAIN: CPT

## 2025-07-18 PROCEDURE — 83880 ASSAY OF NATRIURETIC PEPTIDE: CPT

## 2025-07-18 PROCEDURE — 84484 ASSAY OF TROPONIN QUANT: CPT

## 2025-07-18 PROCEDURE — 71045 X-RAY EXAM CHEST 1 VIEW: CPT

## 2025-07-18 PROCEDURE — 85610 PROTHROMBIN TIME: CPT

## 2025-07-18 PROCEDURE — 83735 ASSAY OF MAGNESIUM: CPT

## 2025-07-18 RX ORDER — MUPIROCIN 2 %
OINTMENT (GRAM) TOPICAL
Qty: 1 G | Refills: 0 | Status: SHIPPED | OUTPATIENT
Start: 2025-07-18 | End: 2025-07-25

## 2025-07-18 RX ORDER — SULFAMETHOXAZOLE AND TRIMETHOPRIM 800; 160 MG/1; MG/1
1 TABLET ORAL 2 TIMES DAILY
Qty: 14 TABLET | Refills: 0 | Status: SHIPPED | OUTPATIENT
Start: 2025-07-18 | End: 2025-07-25

## 2025-07-18 ASSESSMENT — LIFESTYLE VARIABLES
HOW OFTEN DO YOU HAVE A DRINK CONTAINING ALCOHOL: 2-4 TIMES A MONTH
HOW MANY STANDARD DRINKS CONTAINING ALCOHOL DO YOU HAVE ON A TYPICAL DAY: 3 OR 4

## 2025-07-18 ASSESSMENT — PAIN - FUNCTIONAL ASSESSMENT: PAIN_FUNCTIONAL_ASSESSMENT: NONE - DENIES PAIN

## 2025-07-18 NOTE — DISCHARGE INSTRUCTIONS
Please call and follow-up with your family doctor.   your prescriptions from the pharmacy.  Return to the ED if your symptoms worsen.

## 2025-07-18 NOTE — ED PROVIDER NOTES
[facility] EMERGENCY DEPARTMENT  EMERGENCY DEPARTMENT ENCOUNTER        Pt Name: Travon Negrete  MRN: [id number]  Birthdate 1955  Date of evaluation: 7/18/2025  Provider: Brandi Rosen MD  PCP: Daniel Cuello MD  Note Started: 2:20 PM EDT 7/18/25    CHIEF COMPLAINT       Chief Complaint   Patient presents with    Atrial Fibrillation     Patient states he was sent in for A fib. States he was a watchman in place.     Wound Check     States he has MRSA on elbows       HISTORY OF PRESENT ILLNESS: 1 or more Elements   History From: Patient    Limitations to history : None    Travon Negrete is a 69 y.o. male with with history of hypertension, atrial fibrillation with watchman placement who presents to the ED for wound check and atrial fibrillation.  Patient reports that over the last couple weeks he has noticed an open wound area to his left wrist and bilateral elbows.  Describes superficial open wound with associated itchiness. Pt is unsure how he obtained these wounds. He notes his wife has a similar wound area. Per pt, he presented to PCP 1 week ago who was prescribed Keflex.  He states the wounds are still present.  He saw his PCP today who recommended patient come to the ED to have cultures obtained for concerns of MRSA.  No known hx of MRSA. He was also sent due to PCP being concerned that patient may be in atrial fibrillation at this time.  Patient is not on any anticoagulants. Pt does have a watchman in place. Denies any chest pain.  Patient has otherwise been feeling well.  Patient denies any fever, chills, chest pain, abdominal pain, nausea, vomiting, diarrhea.    Nursing Notes were all reviewed and agreed with or any disagreements were addressed in the HPI.      REVIEW OF EXTERNAL NOTES :         2/11/2025: Patient follows with BRIAN Bui for cardiology for atrial fibrillation.  Patient had watchman placed on 8/29/2024.  He has been doing well since the

## 2025-07-20 LAB
EKG ATRIAL RATE: 71 BPM
EKG P AXIS: 61 DEGREES
EKG P-R INTERVAL: 198 MS
EKG Q-T INTERVAL: 410 MS
EKG QRS DURATION: 90 MS
EKG QTC CALCULATION (BAZETT): 445 MS
EKG R AXIS: -13 DEGREES
EKG T AXIS: -10 DEGREES
EKG VENTRICULAR RATE: 71 BPM
MICROORGANISM SPEC CULT: NO GROWTH
MICROORGANISM/AGENT SPEC: NORMAL
SPECIMEN DESCRIPTION: NORMAL

## 2025-07-20 PROCEDURE — 93010 ELECTROCARDIOGRAM REPORT: CPT | Performed by: INTERNAL MEDICINE

## 2025-08-20 ENCOUNTER — OFFICE VISIT (OUTPATIENT)
Age: 70
End: 2025-08-20
Payer: MEDICARE

## 2025-08-20 VITALS
TEMPERATURE: 99 F | OXYGEN SATURATION: 98 % | BODY MASS INDEX: 24.48 KG/M2 | SYSTOLIC BLOOD PRESSURE: 177 MMHG | RESPIRATION RATE: 16 BRPM | HEART RATE: 65 BPM | WEIGHT: 171 LBS | DIASTOLIC BLOOD PRESSURE: 100 MMHG | HEIGHT: 70 IN

## 2025-08-20 DIAGNOSIS — K70.30 ALCOHOLIC CIRRHOSIS OF LIVER WITHOUT ASCITES (HCC): Primary | ICD-10-CM

## 2025-08-20 PROCEDURE — G8427 DOCREV CUR MEDS BY ELIG CLIN: HCPCS | Performed by: TRANSPLANT SURGERY

## 2025-08-20 PROCEDURE — 1159F MED LIST DOCD IN RCRD: CPT | Performed by: TRANSPLANT SURGERY

## 2025-08-20 PROCEDURE — 99204 OFFICE O/P NEW MOD 45 MIN: CPT | Performed by: TRANSPLANT SURGERY

## 2025-08-20 PROCEDURE — G8420 CALC BMI NORM PARAMETERS: HCPCS | Performed by: TRANSPLANT SURGERY

## 2025-08-20 PROCEDURE — 3017F COLORECTAL CA SCREEN DOC REV: CPT | Performed by: TRANSPLANT SURGERY

## 2025-08-20 PROCEDURE — 4004F PT TOBACCO SCREEN RCVD TLK: CPT | Performed by: TRANSPLANT SURGERY

## 2025-08-20 PROCEDURE — 1123F ACP DISCUSS/DSCN MKR DOCD: CPT | Performed by: TRANSPLANT SURGERY

## 2025-08-20 RX ORDER — POTASSIUM CITRATE 1080 MG/1
TABLET, EXTENDED RELEASE ORAL 2 TIMES DAILY
COMMUNITY

## 2025-08-20 RX ORDER — FLUOCINONIDE 0.5 MG/G
CREAM TOPICAL 2 TIMES DAILY
COMMUNITY

## 2025-08-20 RX ORDER — LANOLIN ALCOHOL/MO/W.PET/CERES
1000 CREAM (GRAM) TOPICAL DAILY
COMMUNITY

## 2025-08-21 ENCOUNTER — TELEPHONE (OUTPATIENT)
Age: 70
End: 2025-08-21

## 2025-08-25 ENCOUNTER — TELEPHONE (OUTPATIENT)
Age: 70
End: 2025-08-25

## 2025-08-25 ENCOUNTER — TELEPHONE (OUTPATIENT)
Dept: CARDIOLOGY CLINIC | Age: 70
End: 2025-08-25

## 2025-08-26 ENCOUNTER — TELEPHONE (OUTPATIENT)
Age: 70
End: 2025-08-26

## 2025-08-28 ENCOUNTER — TELEPHONE (OUTPATIENT)
Age: 70
End: 2025-08-28

## 2025-08-28 ENCOUNTER — ANESTHESIA EVENT (OUTPATIENT)
Age: 70
End: 2025-08-28
Payer: MEDICARE

## 2025-08-29 ENCOUNTER — ANESTHESIA (OUTPATIENT)
Age: 70
End: 2025-08-29
Payer: MEDICARE

## 2025-08-29 ENCOUNTER — HOSPITAL ENCOUNTER (OUTPATIENT)
Age: 70
Discharge: HOME | End: 2025-08-31
Attending: INTERNAL MEDICINE
Payer: MEDICARE

## 2025-08-29 VITALS
BODY MASS INDEX: 25.05 KG/M2 | OXYGEN SATURATION: 98 % | HEART RATE: 71 BPM | WEIGHT: 175 LBS | SYSTOLIC BLOOD PRESSURE: 145 MMHG | RESPIRATION RATE: 18 BRPM | TEMPERATURE: 97.6 F | HEIGHT: 70 IN | DIASTOLIC BLOOD PRESSURE: 103 MMHG

## 2025-08-29 DIAGNOSIS — I48.0 PAROXYSMAL ATRIAL FIBRILLATION (HCC): ICD-10-CM

## 2025-08-29 DIAGNOSIS — Z95.818 PRESENCE OF WATCHMAN LEFT ATRIAL APPENDAGE CLOSURE DEVICE: ICD-10-CM

## 2025-08-29 LAB — ECHO BSA: 1.98 M2

## 2025-08-29 PROCEDURE — 93312 ECHO TRANSESOPHAGEAL: CPT

## 2025-08-29 PROCEDURE — 6360000002 HC RX W HCPCS

## 2025-08-29 PROCEDURE — 2580000003 HC RX 258

## 2025-08-29 PROCEDURE — 3700000001 HC ADD 15 MINUTES (ANESTHESIA)

## 2025-08-29 PROCEDURE — 7100000011 HC PHASE II RECOVERY - ADDTL 15 MIN

## 2025-08-29 PROCEDURE — 3700000000 HC ANESTHESIA ATTENDED CARE

## 2025-08-29 PROCEDURE — 7100000010 HC PHASE II RECOVERY - FIRST 15 MIN

## 2025-08-29 PROCEDURE — 2500000003 HC RX 250 WO HCPCS

## 2025-08-29 RX ORDER — SODIUM CHLORIDE 9 MG/ML
INJECTION, SOLUTION INTRAVENOUS
Status: DISCONTINUED | OUTPATIENT
Start: 2025-08-29 | End: 2025-08-29 | Stop reason: SDUPTHER

## 2025-08-29 RX ORDER — SODIUM CHLORIDE 9 MG/ML
INJECTION, SOLUTION INTRAVENOUS CONTINUOUS
Status: DISCONTINUED | OUTPATIENT
Start: 2025-08-29 | End: 2025-09-01 | Stop reason: HOSPADM

## 2025-08-29 RX ORDER — DEXMEDETOMIDINE HYDROCHLORIDE 100 UG/ML
INJECTION, SOLUTION INTRAVENOUS
Status: DISCONTINUED | OUTPATIENT
Start: 2025-08-29 | End: 2025-08-29 | Stop reason: SDUPTHER

## 2025-08-29 RX ORDER — PROPOFOL 10 MG/ML
INJECTION, EMULSION INTRAVENOUS
Status: DISCONTINUED | OUTPATIENT
Start: 2025-08-29 | End: 2025-08-29 | Stop reason: SDUPTHER

## 2025-08-29 RX ADMIN — SODIUM CHLORIDE: 9 INJECTION, SOLUTION INTRAVENOUS at 09:58

## 2025-08-29 RX ADMIN — DEXMEDETOMIDINE HCL 4 MCG: 100 INJECTION INTRAVENOUS at 10:03

## 2025-08-29 RX ADMIN — PROPOFOL 75 MCG/KG/MIN: 10 INJECTION, EMULSION INTRAVENOUS at 09:59

## 2025-08-29 RX ADMIN — PROPOFOL 60 MG: 10 INJECTION, EMULSION INTRAVENOUS at 09:58

## 2025-08-29 RX ADMIN — DEXMEDETOMIDINE HCL 4 MCG: 100 INJECTION INTRAVENOUS at 09:58

## 2025-08-29 ASSESSMENT — ENCOUNTER SYMPTOMS: SHORTNESS OF BREATH: 1

## 2025-08-29 ASSESSMENT — LIFESTYLE VARIABLES: SMOKING_STATUS: 1

## 2025-09-02 LAB — ECHO BSA: 1.98 M2

## 2025-09-02 PROCEDURE — 93320 DOPPLER ECHO COMPLETE: CPT | Performed by: INTERNAL MEDICINE

## 2025-09-02 PROCEDURE — 93319 3D ECHO IMG CGEN CAR ANOMAL: CPT | Performed by: INTERNAL MEDICINE

## 2025-09-02 PROCEDURE — 93312 ECHO TRANSESOPHAGEAL: CPT | Performed by: INTERNAL MEDICINE

## (undated) DEVICE — 1 X VERSACROSS TRANSSEPTAL SHEATH (INCLUDING  1 X J-TIP GUIDEWIRE); 1 X VERSACROSS RF WIRE (INCLUDING 1 X CONNECTOR CABLE (SINGLE USE)); 1 X DISPERSIVE ELECTRODE: Brand: VERSACROSS ACCESS SOLUTION

## (undated) DEVICE — CATHETER F BLLN 5CC 16FR 2 W HYDRGEL COAT LESS TRAUM LUB

## (undated) DEVICE — BAG DRAINAGE CONTAINER 15 LT FLUID COLLCTN

## (undated) DEVICE — SURGICAL PROCEDURE KIT 3 W

## (undated) DEVICE — GUIDEWIRE UROLOGICAL STR STD 0.035 IN X150 CM (QTY = BOX OF 5)

## (undated) DEVICE — ACCESS SHEATH WITH DILATOR: Brand: WATCHMAN FXD CURVE™ ACCESS SYSTEM

## (undated) DEVICE — PERCLOSE™ PROSTYLE™ SUTURE-MEDIATED CLOSURE AND REPAIR SYSTEM: Brand: PERCLOSE™ PROSTYLE™

## (undated) DEVICE — GUIDEWIRE ANGIO L150CM OD0.035IN STR FIX PTFE SLD SUPP

## (undated) DEVICE — GLOVE ORANGE PI 7 1/2   MSG9075

## (undated) DEVICE — CYSTO: Brand: MEDLINE INDUSTRIES, INC.

## (undated) DEVICE — CATHETER 5FR PIGSTR MEDTRONIC 110CM

## (undated) DEVICE — SOLUTION IRRIG 1000ML STRL H2O USP PLAS POUR BTL

## (undated) DEVICE — CATHETER ETER URET L65CM OD5FR OLV TIP

## (undated) DEVICE — PAD, DEFIB, ADULT, RADIOTRAN, PHYSIO, LO: Brand: MEDLINE

## (undated) DEVICE — GOWN,SIRUS,FABRNF,XL,20/CS: Brand: MEDLINE

## (undated) DEVICE — SOLUTION IRRIG 3000ML STRL H2O USP UROMATIC PLAS CONT

## (undated) DEVICE — SURGICAL PROCEDURE TRAY EPIDURAL CUST

## (undated) DEVICE — NEEDLE ANGIO 1 WALL STYL 18 GAX70 CM THN ADV

## (undated) DEVICE — SHEATH INTRO 6FR L11CM 0.038IN SIL TRICSP VLV W/ GWIRE

## (undated) DEVICE — TUBING PRSS MON L12IN PVC RIG NONEXPANDING M TO FEM CONN

## (undated) DEVICE — TUR/ENDOSCOPIC CABLE, 10' (3.05 M): Brand: CONMED

## (undated) DEVICE — MEDIA CONTRAST ISOVUE 300 100ML